# Patient Record
Sex: MALE | Race: WHITE | NOT HISPANIC OR LATINO | Employment: OTHER | URBAN - METROPOLITAN AREA
[De-identification: names, ages, dates, MRNs, and addresses within clinical notes are randomized per-mention and may not be internally consistent; named-entity substitution may affect disease eponyms.]

---

## 2017-01-01 ENCOUNTER — OFFICE VISIT (OUTPATIENT)
Dept: RADIATION ONCOLOGY | Facility: HOSPITAL | Age: 82
End: 2017-01-01

## 2017-01-01 ENCOUNTER — LAB REQUISITION (OUTPATIENT)
Dept: LAB | Facility: HOSPITAL | Age: 82
End: 2017-01-01

## 2017-01-01 ENCOUNTER — RADIATION ONCOLOGY WEEKLY ASSESSMENT (OUTPATIENT)
Dept: RADIATION ONCOLOGY | Facility: HOSPITAL | Age: 82
End: 2017-01-01

## 2017-01-01 ENCOUNTER — TELEPHONE (OUTPATIENT)
Dept: FAMILY MEDICINE CLINIC | Facility: CLINIC | Age: 82
End: 2017-01-01

## 2017-01-01 ENCOUNTER — OFFICE VISIT (OUTPATIENT)
Dept: FAMILY MEDICINE CLINIC | Facility: CLINIC | Age: 82
End: 2017-01-01

## 2017-01-01 ENCOUNTER — APPOINTMENT (OUTPATIENT)
Dept: GENERAL RADIOLOGY | Facility: HOSPITAL | Age: 82
End: 2017-01-01

## 2017-01-01 ENCOUNTER — DOCUMENTATION (OUTPATIENT)
Dept: RADIATION ONCOLOGY | Facility: HOSPITAL | Age: 82
End: 2017-01-01

## 2017-01-01 ENCOUNTER — RESULTS ENCOUNTER (OUTPATIENT)
Dept: FAMILY MEDICINE CLINIC | Facility: CLINIC | Age: 82
End: 2017-01-01

## 2017-01-01 ENCOUNTER — OFFICE VISIT (OUTPATIENT)
Dept: RETAIL CLINIC | Facility: CLINIC | Age: 82
End: 2017-01-01

## 2017-01-01 ENCOUNTER — APPOINTMENT (OUTPATIENT)
Dept: RADIATION ONCOLOGY | Facility: HOSPITAL | Age: 82
End: 2017-01-01

## 2017-01-01 ENCOUNTER — TELEPHONE (OUTPATIENT)
Dept: SOCIAL WORK | Facility: HOSPITAL | Age: 82
End: 2017-01-01

## 2017-01-01 ENCOUNTER — HOSPITAL ENCOUNTER (EMERGENCY)
Facility: HOSPITAL | Age: 82
Discharge: HOME | End: 2017-10-02
Attending: EMERGENCY MEDICINE | Admitting: EMERGENCY MEDICINE

## 2017-01-01 ENCOUNTER — CONSULT (OUTPATIENT)
Dept: RADIATION ONCOLOGY | Facility: HOSPITAL | Age: 82
End: 2017-01-01

## 2017-01-01 VITALS
SYSTOLIC BLOOD PRESSURE: 128 MMHG | HEART RATE: 63 BPM | BODY MASS INDEX: 26.92 KG/M2 | OXYGEN SATURATION: 95 % | WEIGHT: 193 LBS | DIASTOLIC BLOOD PRESSURE: 70 MMHG

## 2017-01-01 VITALS
SYSTOLIC BLOOD PRESSURE: 172 MMHG | HEIGHT: 71 IN | HEART RATE: 114 BPM | WEIGHT: 196.4 LBS | BODY MASS INDEX: 27.5 KG/M2 | RESPIRATION RATE: 16 BRPM | TEMPERATURE: 98.5 F | OXYGEN SATURATION: 95 % | DIASTOLIC BLOOD PRESSURE: 84 MMHG

## 2017-01-01 VITALS
OXYGEN SATURATION: 98 % | SYSTOLIC BLOOD PRESSURE: 213 MMHG | HEART RATE: 51 BPM | DIASTOLIC BLOOD PRESSURE: 73 MMHG | TEMPERATURE: 96.9 F | RESPIRATION RATE: 16 BRPM

## 2017-01-01 VITALS — OXYGEN SATURATION: 97 % | HEART RATE: 50 BPM

## 2017-01-01 VITALS
HEIGHT: 71 IN | RESPIRATION RATE: 16 BRPM | HEART RATE: 63 BPM | SYSTOLIC BLOOD PRESSURE: 128 MMHG | TEMPERATURE: 97.2 F | DIASTOLIC BLOOD PRESSURE: 70 MMHG | BODY MASS INDEX: 27.02 KG/M2 | OXYGEN SATURATION: 95 % | WEIGHT: 193 LBS

## 2017-01-01 VITALS
DIASTOLIC BLOOD PRESSURE: 88 MMHG | TEMPERATURE: 97.6 F | HEART RATE: 116 BPM | RESPIRATION RATE: 16 BRPM | OXYGEN SATURATION: 94 % | WEIGHT: 197 LBS | HEIGHT: 71 IN | BODY MASS INDEX: 27.58 KG/M2 | SYSTOLIC BLOOD PRESSURE: 166 MMHG

## 2017-01-01 VITALS
OXYGEN SATURATION: 96 % | HEART RATE: 82 BPM | TEMPERATURE: 98 F | DIASTOLIC BLOOD PRESSURE: 86 MMHG | SYSTOLIC BLOOD PRESSURE: 152 MMHG | RESPIRATION RATE: 16 BRPM

## 2017-01-01 VITALS
DIASTOLIC BLOOD PRESSURE: 97 MMHG | SYSTOLIC BLOOD PRESSURE: 197 MMHG | TEMPERATURE: 97 F | HEART RATE: 107 BPM | OXYGEN SATURATION: 95 % | RESPIRATION RATE: 16 BRPM

## 2017-01-01 VITALS
RESPIRATION RATE: 16 BRPM | TEMPERATURE: 97 F | OXYGEN SATURATION: 96 % | HEART RATE: 71 BPM | DIASTOLIC BLOOD PRESSURE: 85 MMHG | SYSTOLIC BLOOD PRESSURE: 190 MMHG

## 2017-01-01 VITALS
SYSTOLIC BLOOD PRESSURE: 187 MMHG | OXYGEN SATURATION: 100 % | WEIGHT: 197 LBS | BODY MASS INDEX: 27.58 KG/M2 | DIASTOLIC BLOOD PRESSURE: 85 MMHG | HEART RATE: 76 BPM | RESPIRATION RATE: 16 BRPM | HEIGHT: 71 IN

## 2017-01-01 VITALS — HEART RATE: 52 BPM | OXYGEN SATURATION: 97 % | RESPIRATION RATE: 16 BRPM | TEMPERATURE: 97 F

## 2017-01-01 DIAGNOSIS — M19.90 ARTHRITIS: ICD-10-CM

## 2017-01-01 DIAGNOSIS — E78.5 HYPERLIPIDEMIA, UNSPECIFIED HYPERLIPIDEMIA TYPE: Primary | ICD-10-CM

## 2017-01-01 DIAGNOSIS — S30.0XXA LUMBAR CONTUSION, INITIAL ENCOUNTER: Primary | ICD-10-CM

## 2017-01-01 DIAGNOSIS — R97.20 ELEVATED PROSTATE SPECIFIC ANTIGEN (PSA): ICD-10-CM

## 2017-01-01 DIAGNOSIS — I10 ESSENTIAL HYPERTENSION: ICD-10-CM

## 2017-01-01 DIAGNOSIS — Z23 NEEDS FLU SHOT: Primary | ICD-10-CM

## 2017-01-01 DIAGNOSIS — R97.20 ELEVATED PSA: ICD-10-CM

## 2017-01-01 DIAGNOSIS — M54.5 CHRONIC LOW BACK PAIN, UNSPECIFIED BACK PAIN LATERALITY, WITH SCIATICA PRESENCE UNSPECIFIED: ICD-10-CM

## 2017-01-01 DIAGNOSIS — C44.229 SQUAMOUS CELL CARCINOMA OF SKIN OF LEFT EAR AND EXTERNAL AUDITORY CANAL: Primary | ICD-10-CM

## 2017-01-01 DIAGNOSIS — Z79.899 HIGH RISK MEDICATION USE: ICD-10-CM
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
## 2017-01-01 DIAGNOSIS — M81.0 OSTEOPOROSIS: ICD-10-CM

## 2017-01-01 DIAGNOSIS — G89.29 CHRONIC LOW BACK PAIN, UNSPECIFIED BACK PAIN LATERALITY, WITH SCIATICA PRESENCE UNSPECIFIED: ICD-10-CM

## 2017-01-01 DIAGNOSIS — C44.229 SQUAMOUS CELL CARCINOMA OF SKIN OF LEFT EAR AND EXTERNAL AUDITORY CANAL: ICD-10-CM

## 2017-01-01 DIAGNOSIS — H61.92: ICD-10-CM

## 2017-01-01 LAB
1,25(OH)2D3 SERPL-MCNC: 35.8 PG/ML (ref 19.9–79.3)
ALBUMIN SERPL-MCNC: 4.1 G/DL (ref 3.5–5.2)
ALBUMIN/GLOB SERPL: 1.3 G/DL
ALP SERPL-CCNC: 63 U/L (ref 39–117)
ALT SERPL-CCNC: 15 U/L (ref 1–41)
AST SERPL-CCNC: 22 U/L (ref 1–40)
BASOPHILS # BLD AUTO: 0.02 10*3/MM3 (ref 0–0.2)
BASOPHILS NFR BLD AUTO: 0.3 % (ref 0–1.5)
BILIRUB SERPL-MCNC: 0.5 MG/DL (ref 0.1–1.2)
BUN SERPL-MCNC: 13 MG/DL (ref 8–23)
BUN/CREAT SERPL: 14.3 (ref 7–25)
CALCIUM SERPL-MCNC: 9.9 MG/DL (ref 8.6–10.5)
CHLORIDE SERPL-SCNC: 99 MMOL/L (ref 98–107)
CHOLEST SERPL-MCNC: 130 MG/DL (ref 0–200)
CO2 SERPL-SCNC: 27.4 MMOL/L (ref 22–29)
CREAT SERPL-MCNC: 0.91 MG/DL (ref 0.76–1.27)
EOSINOPHIL # BLD AUTO: 0.32 10*3/MM3 (ref 0–0.7)
EOSINOPHIL NFR BLD AUTO: 5.3 % (ref 0.3–6.2)
ERYTHROCYTE [DISTWIDTH] IN BLOOD BY AUTOMATED COUNT: 13.8 % (ref 11.5–14.5)
GLOBULIN SER CALC-MCNC: 3.1 GM/DL
GLUCOSE SERPL-MCNC: 95 MG/DL (ref 65–99)
HCT VFR BLD AUTO: 40.6 % (ref 40.4–52.2)
HDLC SERPL-MCNC: 69 MG/DL (ref 40–60)
HGB BLD-MCNC: 13.3 G/DL (ref 13.7–17.6)
IMM GRANULOCYTES # BLD: 0 10*3/MM3 (ref 0–0.03)
IMM GRANULOCYTES NFR BLD: 0 % (ref 0–0.5)
INTERPRETATION: NORMAL
LAB AP CASE REPORT: NORMAL
LAB AP CLINICAL INFORMATION: NORMAL
LDLC SERPL CALC-MCNC: 48 MG/DL (ref 0–100)
LYMPHOCYTES # BLD AUTO: 1.84 10*3/MM3 (ref 0.9–4.8)
LYMPHOCYTES NFR BLD AUTO: 30.3 % (ref 19.6–45.3)
Lab: NORMAL
MCH RBC QN AUTO: 31.3 PG (ref 27–32.7)
MCHC RBC AUTO-ENTMCNC: 32.8 G/DL (ref 32.6–36.4)
MCV RBC AUTO: 95.5 FL (ref 79.8–96.2)
MONOCYTES # BLD AUTO: 0.95 10*3/MM3 (ref 0.2–1.2)
MONOCYTES NFR BLD AUTO: 15.7 % (ref 5–12)
NEUTROPHILS # BLD AUTO: 2.94 10*3/MM3 (ref 1.9–8.1)
NEUTROPHILS NFR BLD AUTO: 48.4 % (ref 42.7–76)
PATH REPORT.FINAL DX SPEC: NORMAL
PATH REPORT.GROSS SPEC: NORMAL
PLATELET # BLD AUTO: 269 10*3/MM3 (ref 140–500)
POTASSIUM SERPL-SCNC: 4.3 MMOL/L (ref 3.5–5.2)
PROT SERPL-MCNC: 7.2 G/DL (ref 6–8.5)
PSA SERPL-MCNC: 4.45 NG/ML (ref 0–4)
RBC # BLD AUTO: 4.25 10*6/MM3 (ref 4.6–6)
SODIUM SERPL-SCNC: 139 MMOL/L (ref 136–145)
TRIGL SERPL-MCNC: 65 MG/DL (ref 0–150)
VLDLC SERPL CALC-MCNC: 13 MG/DL (ref 5–40)
WBC # BLD AUTO: 6.07 10*3/MM3 (ref 4.5–10.7)

## 2017-01-01 PROCEDURE — 77331 SPECIAL RADIATION DOSIMETRY: CPT | Performed by: RADIOLOGY

## 2017-01-01 PROCEDURE — 77412 RADIATION TX DELIVERY LVL 3: CPT | Performed by: RADIOLOGY

## 2017-01-01 PROCEDURE — 77336 RADIATION PHYSICS CONSULT: CPT | Performed by: RADIOLOGY

## 2017-01-01 PROCEDURE — 77427 RADIATION TX MANAGEMENT X5: CPT | Performed by: RADIOLOGY

## 2017-01-01 PROCEDURE — 99213 OFFICE O/P EST LOW 20 MIN: CPT | Performed by: FAMILY MEDICINE

## 2017-01-01 PROCEDURE — 77334 RADIATION TREATMENT AID(S): CPT | Performed by: RADIOLOGY

## 2017-01-01 PROCEDURE — 72110 X-RAY EXAM L-2 SPINE 4/>VWS: CPT

## 2017-01-01 PROCEDURE — 77417 THER RADIOLOGY PORT IMAGE(S): CPT | Performed by: RADIOLOGY

## 2017-01-01 PROCEDURE — 77300 RADIATION THERAPY DOSE PLAN: CPT | Performed by: RADIOLOGY

## 2017-01-01 PROCEDURE — G0463 HOSPITAL OUTPT CLINIC VISIT: HCPCS | Performed by: RADIOLOGY

## 2017-01-01 PROCEDURE — 77280 THER RAD SIMULAJ FIELD SMPL: CPT | Performed by: RADIOLOGY

## 2017-01-01 PROCEDURE — 99024 POSTOP FOLLOW-UP VISIT: CPT | Performed by: RADIOLOGY

## 2017-01-01 PROCEDURE — 77295 3-D RADIOTHERAPY PLAN: CPT | Performed by: RADIOLOGY

## 2017-01-01 PROCEDURE — 77290 THER RAD SIMULAJ FIELD CPLX: CPT | Performed by: RADIOLOGY

## 2017-01-01 PROCEDURE — 99204 OFFICE O/P NEW MOD 45 MIN: CPT | Performed by: RADIOLOGY

## 2017-01-01 PROCEDURE — 77263 THER RADIOLOGY TX PLNG CPLX: CPT | Performed by: RADIOLOGY

## 2017-01-01 PROCEDURE — 77332 RADIATION TREATMENT AID(S): CPT | Performed by: RADIOLOGY

## 2017-01-01 PROCEDURE — 99283 EMERGENCY DEPT VISIT LOW MDM: CPT

## 2017-01-01 PROCEDURE — 88305 TISSUE EXAM BY PATHOLOGIST: CPT | Performed by: OTOLARYNGOLOGY

## 2017-01-01 RX ORDER — RAMIPRIL 10 MG/1
CAPSULE ORAL
Qty: 180 CAPSULE | Refills: 0 | Status: SHIPPED | OUTPATIENT
Start: 2017-01-01 | End: 2017-01-01 | Stop reason: SDUPTHER

## 2017-01-01 RX ORDER — RAMIPRIL 10 MG/1
CAPSULE ORAL
Qty: 180 CAPSULE | Refills: 0
Start: 2017-01-01 | End: 2017-01-01 | Stop reason: SDUPTHER

## 2017-01-01 RX ORDER — BACLOFEN 10 MG/1
10 TABLET ORAL 3 TIMES DAILY PRN
Qty: 15 TABLET | Refills: 0 | Status: ON HOLD | OUTPATIENT
Start: 2017-01-01 | End: 2018-01-01

## 2017-01-01 RX ORDER — BACLOFEN 10 MG/1
10 TABLET ORAL ONCE
Status: COMPLETED | OUTPATIENT
Start: 2017-01-01 | End: 2017-01-01

## 2017-01-01 RX ORDER — HYDROCODONE BITARTRATE AND ACETAMINOPHEN 7.5; 325 MG/1; MG/1
1 TABLET ORAL EVERY 8 HOURS PRN
Qty: 60 TABLET | Refills: 0 | Status: SHIPPED | OUTPATIENT
Start: 2017-01-01 | End: 2017-01-01 | Stop reason: SDUPTHER

## 2017-01-01 RX ORDER — VALSARTAN 160 MG/1
160 TABLET ORAL DAILY
Qty: 90 TABLET | Refills: 3 | Status: ON HOLD | OUTPATIENT
Start: 2017-01-01 | End: 2018-01-01

## 2017-01-01 RX ORDER — CELECOXIB 200 MG/1
200 CAPSULE ORAL DAILY
Qty: 90 CAPSULE | Refills: 3 | Status: ON HOLD | OUTPATIENT
Start: 2017-01-01 | End: 2018-01-01

## 2017-01-01 RX ORDER — HYDROCODONE BITARTRATE AND ACETAMINOPHEN 7.5; 325 MG/1; MG/1
1 TABLET ORAL EVERY 8 HOURS PRN
Qty: 90 TABLET | Refills: 0 | Status: SHIPPED | OUTPATIENT
Start: 2017-01-01 | End: 2017-01-01 | Stop reason: SDUPTHER

## 2017-01-01 RX ORDER — CELECOXIB 200 MG/1
CAPSULE ORAL
Qty: 90 CAPSULE | Refills: 0 | Status: SHIPPED | OUTPATIENT
Start: 2017-01-01 | End: 2017-01-01 | Stop reason: SDUPTHER

## 2017-01-01 RX ORDER — HYDROCODONE BITARTRATE AND ACETAMINOPHEN 7.5; 325 MG/1; MG/1
1 TABLET ORAL EVERY 8 HOURS PRN
Qty: 90 TABLET | Refills: 0 | Status: SHIPPED | OUTPATIENT
Start: 2017-01-01 | End: 2018-01-01 | Stop reason: SDUPTHER

## 2017-01-01 RX ORDER — SIMVASTATIN 20 MG
20 TABLET ORAL NIGHTLY
Qty: 90 TABLET | Refills: 3 | Status: SHIPPED | OUTPATIENT
Start: 2017-01-01 | End: 2018-01-01 | Stop reason: HOSPADM

## 2017-01-01 RX ORDER — OXYCODONE HYDROCHLORIDE AND ACETAMINOPHEN 5; 325 MG/1; MG/1
1 TABLET ORAL ONCE
Status: COMPLETED | OUTPATIENT
Start: 2017-01-01 | End: 2017-01-01

## 2017-01-01 RX ORDER — SIMVASTATIN 20 MG
20 TABLET ORAL NIGHTLY
Qty: 90 TABLET | Refills: 0
Start: 2017-01-01 | End: 2017-01-01 | Stop reason: SDUPTHER

## 2017-01-01 RX ORDER — VALSARTAN 160 MG/1
TABLET ORAL
Qty: 90 TABLET | Refills: 0 | Status: SHIPPED | OUTPATIENT
Start: 2017-01-01 | End: 2017-01-01 | Stop reason: SDUPTHER

## 2017-01-01 RX ORDER — RAMIPRIL 10 MG/1
CAPSULE ORAL
Qty: 180 CAPSULE | Refills: 0 | Status: SHIPPED | OUTPATIENT
Start: 2017-01-01 | End: 2018-01-01 | Stop reason: HOSPADM

## 2017-01-01 RX ADMIN — OXYCODONE HYDROCHLORIDE AND ACETAMINOPHEN 1 TABLET: 5; 325 TABLET ORAL at 10:01

## 2017-01-01 RX ADMIN — BACLOFEN 10 MG: 10 TABLET ORAL at 09:59

## 2017-01-04 RX ORDER — HYDROCODONE BITARTRATE AND ACETAMINOPHEN 7.5; 325 MG/1; MG/1
1 TABLET ORAL 2 TIMES DAILY
Qty: 60 TABLET | Refills: 0 | Status: SHIPPED | OUTPATIENT
Start: 2017-01-04 | End: 2017-02-22 | Stop reason: SDUPTHER

## 2017-01-04 NOTE — TELEPHONE ENCOUNTER
----- Message from Fern Barillas sent at 1/3/2017  2:11 PM EST -----  HYDROcodone-acetaminophen (NORCO) 7.5-325 MG per tablet  Sig: Take 1 tablet by mouth 2 (two) times a day.      PT WILL BE IN Thursday WITH HIS WIFE, SO YOU DON'T HAVE TO CALL WHEN ITS READY.

## 2017-01-20 RX ORDER — SIMVASTATIN 20 MG
20 TABLET ORAL NIGHTLY
Qty: 90 TABLET | Refills: 0 | Status: SHIPPED | OUTPATIENT
Start: 2017-01-20 | End: 2017-01-01 | Stop reason: SDUPTHER

## 2017-01-20 RX ORDER — RAMIPRIL 10 MG/1
CAPSULE ORAL
Qty: 180 CAPSULE | Refills: 0 | Status: SHIPPED | OUTPATIENT
Start: 2017-01-20 | End: 2017-01-01 | Stop reason: SDUPTHER

## 2017-02-21 ENCOUNTER — TELEPHONE (OUTPATIENT)
Dept: FAMILY MEDICINE CLINIC | Facility: CLINIC | Age: 82
End: 2017-02-21

## 2017-02-21 NOTE — TELEPHONE ENCOUNTER
Please call advised the patient he needs to come in and see -year-old he has not actually been in the office for a visit for probably 7 or 8 months and he needs to re-sign his [facility] cannot fill his medication though this is not

## 2017-02-21 NOTE — TELEPHONE ENCOUNTER
Updating allison. Please advise about refilling Hydrocodone. He has not been seen since June 2016.

## 2017-02-21 NOTE — TELEPHONE ENCOUNTER
rf request for hydrocodone 7.5-325    Takes one tablet q12 hrs    Says he can pickup tomorrow    Do you want to refill or should I send to ?    He has not been seen since June 2016,I can update allison and he had a rf 1/2017. Please advise. Not sure how he kept receiving refills without being seem.

## 2017-02-22 ENCOUNTER — TELEPHONE (OUTPATIENT)
Dept: FAMILY MEDICINE CLINIC | Facility: CLINIC | Age: 82
End: 2017-02-22

## 2017-02-22 RX ORDER — HYDROCODONE BITARTRATE AND ACETAMINOPHEN 7.5; 325 MG/1; MG/1
1 TABLET ORAL EVERY 8 HOURS PRN
Qty: 60 TABLET | Refills: 0 | Status: SHIPPED | OUTPATIENT
Start: 2017-02-22 | End: 2017-01-01 | Stop reason: SDUPTHER

## 2017-02-22 NOTE — TELEPHONE ENCOUNTER
Krzysztof Ward EDUARDO  Male, 87 y.o., 09/02/1929  PCP:   Willie Ramirez MD  Language:   English  Need Interp:   No  Last Weight:   198 lb (89.8 kg)  Phone:   H: [phone]  Allergies  No Known Allergies  Health Maintenance:   Due  FYI:   None  Primary Ins.:   HARSHAD PASCUAL  MRN:   [id number]  MyChart:   Code Exp  Pharmacy:   [facility] #[id number] - [place], [place] - [address] AT [facility] [phone] [facility] [phone] FX [[id number]] [facility] [address] [facility] [phone] [facility] [phone] FX [[id number]] [facility] - [address] [phone] [place] [phone] FX [[id number]] [facility] - [address] [phone] [place] [phone] FX [[id number]]  Preferred Lab:   None  Next Appt Date by Dept:   03/06/2017    Patient Calls            Stephon Dow MD   You 13 hours ago (5:40 PM)               Please call advised the patient he needs to come in and see Dr.-year-old he has not actually been in the office for a visit for probably 7 or 8 months and he needs to re-sign his [facility] cannot fill his medication though this is not                  Documentation                          You   Stephon Dow MD 17 hours ago (1:39 PM)                 Updating allison. Please advise about refilling Hydrocodone. He has not been seen since June 2016.                  Documentation                          Willie Ramirez MD   You 17 hours ago (1:35 PM)                 Send to Dr Dow                  Documentation                          You routed conversation to Willie Ramirez MD 18 hours ago (1:18 PM)                       You 18 hours ago (1:12 PM)                 rf request for hydrocodone 7.5-325     Takes one tablet q12 hrs     Says he can pickup tomorrow     Do you want to refill or should I send to ?     He has not been seen since June 2016,I can update allison and  he had a rf 1/2017. Please advise. Not sure how he kept receiving refills without being seem.                     Documentation                                     Patient was informed he needs appt and he scheduled for next week.rx not approved

## 2017-02-24 RX ORDER — VALSARTAN 160 MG/1
TABLET ORAL
Qty: 90 TABLET | Refills: 0 | Status: SHIPPED | OUTPATIENT
Start: 2017-02-24 | End: 2017-01-01 | Stop reason: SDUPTHER

## 2017-02-24 RX ORDER — CELECOXIB 200 MG/1
CAPSULE ORAL
Qty: 90 CAPSULE | Refills: 0 | Status: SHIPPED | OUTPATIENT
Start: 2017-02-24 | End: 2017-01-01 | Stop reason: SDUPTHER

## 2017-03-06 ENCOUNTER — OFFICE VISIT (OUTPATIENT)
Dept: FAMILY MEDICINE CLINIC | Facility: CLINIC | Age: 82
End: 2017-03-06

## 2017-03-06 VITALS
BODY MASS INDEX: 27.16 KG/M2 | WEIGHT: 194 LBS | RESPIRATION RATE: 15 BRPM | HEART RATE: 64 BPM | DIASTOLIC BLOOD PRESSURE: 70 MMHG | HEIGHT: 71 IN | TEMPERATURE: 97.6 F | SYSTOLIC BLOOD PRESSURE: 160 MMHG

## 2017-03-06 DIAGNOSIS — Z79.899 HIGH RISK MEDICATION USE: Primary | ICD-10-CM

## 2017-03-06 DIAGNOSIS — M81.0 OSTEOPOROSIS: ICD-10-CM

## 2017-03-06 DIAGNOSIS — E78.5 HYPERLIPIDEMIA, UNSPECIFIED HYPERLIPIDEMIA TYPE: ICD-10-CM

## 2017-03-06 DIAGNOSIS — I10 ESSENTIAL HYPERTENSION: ICD-10-CM

## 2017-03-06 DIAGNOSIS — R97.20 ELEVATED PSA: ICD-10-CM

## 2017-03-06 DIAGNOSIS — R97.20 ELEVATED PROSTATE SPECIFIC ANTIGEN (PSA): ICD-10-CM

## 2017-03-06 PROCEDURE — 99213 OFFICE O/P EST LOW 20 MIN: CPT | Performed by: FAMILY MEDICINE

## 2017-03-06 RX ORDER — ALENDRONATE SODIUM 70 MG/1
70 TABLET ORAL
Qty: 12 TABLET | Refills: 3 | Status: SHIPPED | OUTPATIENT
Start: 2017-03-06 | End: 2018-01-01 | Stop reason: SDUPTHER

## 2017-03-06 NOTE — PROGRESS NOTES
HPI  Krzysztof Ward is a 87 y.o. male who is here for follow up of multiple medical problems.  Here to establish a new primary care physician after Dr. Dow retired.  Does take pain medication for rather significant osteoarthritis and osteoporosis. Having significant right knee pain at this time.  Having some clear drainage from the right ear.  Has multiple skin lesions consistent with skin cancers followed by dermatologist.      Review of Systems   HENT: Positive for ear discharge.    Genitourinary: Positive for frequency.   Musculoskeletal: Positive for arthralgias and back pain.   Skin:        Multiple actinic skin changes with very pale complexion   All other systems reviewed and are negative.        No past medical history on file.    No past surgical history on file.    No family history on file.    Social History     Social History   • Marital status:      Spouse name: N/A   • Number of children: N/A   • Years of education: N/A     Occupational History   • Not on file.     Social History Main Topics   • Smoking status: Former Smoker   • Smokeless tobacco: Not on file      Comment: quit 1962   • Alcohol use Not on file   • Drug use: Not on file   • Sexual activity: Not on file     Other Topics Concern   • Not on file     Social History Narrative         Physical Exam   Constitutional: He is oriented to person, place, and time. He appears well-developed and well-nourished. No distress.   HENT:   Head: Normocephalic.   Left Ear: There is drainage. Decreased hearing is noted.   Ears:    Mouth/Throat: Oropharynx is clear and moist.   Eyes: Conjunctivae are normal. Pupils are equal, round, and reactive to light.   Neck: Normal range of motion. No tracheal deviation present.   Cardiovascular: Normal rate and regular rhythm.    Pulmonary/Chest: Effort normal and breath sounds normal.   Abdominal: Soft. Bowel sounds are normal.   Musculoskeletal: He exhibits deformity. He exhibits no edema.   Significant spinal  deformity as well as osteoarthritic right knee with deformity   Neurological: He is alert and oriented to person, place, and time.   Skin: Skin is warm and dry.   Multiple skin lesions noted.  Status post removal left external ear section   Psychiatric: He has a normal mood and affect. His behavior is normal. Judgment and thought content normal.   Nursing note and vitals reviewed.        Assessment/Plan    Krzysztof was seen today for annual exam.    Diagnoses and all orders for this visit:    High risk medication use  -     CBC & Differential; Future  -     Comprehensive Metabolic Panel; Future    Hyperlipidemia, unspecified hyperlipidemia type  -     Lipid Panel; Future    Essential hypertension    Osteoporosis  -     PSA; Future    Elevated PSA  -     Vitamin D 1,25 Dihydroxy; Future    Elevated prostate specific antigen (PSA)   -     PSA; Future    Other orders  -     alendronate (FOSAMAX) 70 MG tablet; Take 1 tablet by mouth Every 7 (Seven) Days.        Patient is mostly here to establish a new primary care physician.  Does get pain medication and is here to establish physician.  Has obvious severe osteoporotic and arthritic changes.  Seems to be fairly stable on current regimen which will be continued including pain medication as needed.  Will return for fasting lab work in 2 months and then routine follow-up appointment in 3 months.    This note includes information entered using a voice recognition dictation system.  Though reviewed, some nonsensible errors may remain.

## 2017-03-11 ENCOUNTER — RESULTS ENCOUNTER (OUTPATIENT)
Dept: FAMILY MEDICINE CLINIC | Facility: CLINIC | Age: 82
End: 2017-03-11

## 2017-03-11 DIAGNOSIS — E78.5 HYPERLIPIDEMIA, UNSPECIFIED HYPERLIPIDEMIA TYPE: ICD-10-CM

## 2017-04-10 ENCOUNTER — TELEPHONE (OUTPATIENT)
Dept: FAMILY MEDICINE CLINIC | Facility: CLINIC | Age: 82
End: 2017-04-10

## 2017-04-10 NOTE — TELEPHONE ENCOUNTER
Refill request for Hydrocodone 7.5-325mg per tablet    Last ov 3/6/2017, allison 2/2017, last rf 2/22/17    Please advise regarding refill

## 2017-04-11 NOTE — TELEPHONE ENCOUNTER
Telephone     4/10/2017  [facility]    [facility], [place]    Conversation   (Oldest Message First)   Me        4/10/17 1:28 PM   Note      Refill request for Hydrocodone 7.5-325mg per tablet     Last ov 3/6/2017, allison 2/2017, last rf 2/22/17     Please advise regarding refill                        4/10/17 1:33 PM   You routed this conversation to Willie Ramirez MD    Additional Documentation   Encounter Info:     Billing Info,     History,     Allergies,     Detailed Report        Orders Placed     None   Medication Renewals and Changes       None      Medication List   Visit Diagnoses       None      Problem List     Ok to refill hydrocodone?  This appears to be Dr. Ramirez's patient please route this message to him

## 2017-06-21 NOTE — PROGRESS NOTES
HPI  Krzysztof Ward is a 87 y.o. male who is here for follow up of hypertension arthritis and other general medical problems.  Reviewed recent lab work all of which was unremarkable.  Has follow-up appointment with ear specialist and also sees dermatologist frequently for skin cancers.  Needs renewal on mail-order medications.  Also takes occasional pain medication as needed.  His son is a chiropractor and will be seeing him later today for his chronic back pain.  All of this was discussed as well as reviewing recent lab.      Review of Systems   HENT: Positive for ear discharge.    Musculoskeletal: Positive for arthralgias and back pain.   Skin: Positive for wound.   Psychiatric/Behavioral:        Recent death of wife   All other systems reviewed and are negative.        No past medical history on file.    No past surgical history on file.    No family history on file.    Social History     Social History   • Marital status:      Spouse name: N/A   • Number of children: N/A   • Years of education: N/A     Occupational History   • Not on file.     Social History Main Topics   • Smoking status: Former Smoker   • Smokeless tobacco: Not on file      Comment: quit 1962   • Alcohol use Not on file   • Drug use: Not on file   • Sexual activity: Not on file     Other Topics Concern   • Not on file     Social History Narrative         Physical Exam   Constitutional: He is oriented to person, place, and time. He appears well-developed and well-nourished. No distress.   HENT:   Left Ear: There is drainage.   Ears:    Mouth/Throat: Oropharynx is clear and moist.   Eyes: Conjunctivae and EOM are normal. Pupils are equal, round, and reactive to light.   Neck: Normal range of motion. Neck supple. No thyromegaly present.   Cardiovascular: Normal rate and regular rhythm.    Pulmonary/Chest: Effort normal.   Abdominal: Soft. Bowel sounds are normal.   Musculoskeletal: He exhibits deformity.   Ambulates using a cane with    Neurological: He is alert and oriented to person, place, and time. He exhibits normal muscle tone. Coordination normal.   Skin: Skin is warm and dry. Lesion noted.        Psychiatric: He has a normal mood and affect. His behavior is normal. Judgment and thought content normal.   Nursing note and vitals reviewed.        Assessment/Plan    Krzysztof was seen today for hypertension and hyperlipidemia.    Diagnoses and all orders for this visit:    Hyperlipidemia, unspecified hyperlipidemia type    Essential hypertension    Chronic low back pain, unspecified back pain laterality, with sciatica presence unspecified    Osteoporosis    Arthritis    Elevated PSA    High risk medication use    Other orders  -     simvastatin (ZOCOR) 20 MG tablet; Take 1 tablet by mouth Every Night.  -     valsartan (DIOVAN) 160 MG tablet; Take 1 tablet by mouth Daily.  -     celecoxib (CeleBREX) 200 MG capsule; Take 1 capsule by mouth Daily.  -     ramipril (ALTACE) 10 MG capsule; TAKE 2 CAPSULES DAILY  -     HYDROcodone-acetaminophen (NORCO) 7.5-325 MG per tablet; Take 1 tablet by mouth Every 8 (Eight) Hours As Needed for Moderate Pain (4-6).    Patient here for routine follow-up of multiple medical problems some of which are noted above.  Adjusting to recent death of wife.  Has follow-up appointment with ear specialist and dermatologist.  Reviewed all recent lab tests which were unremarkable.  Renewed medications as discussed hopefully will not get harassed by insurance coverage?  Otherwise continue current care with follow-up in 3 months.    This note includes information entered using a voice recognition dictation system.  Though reviewed, some nonsensible errors may remain.

## 2017-08-16 PROBLEM — C44.229: Status: ACTIVE | Noted: 2017-01-01

## 2017-08-16 NOTE — PROGRESS NOTES
Subjective     No ref. provider found     Diagnosis Plan   1. 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 Núñez:    I had the [facility] of seeing Krzysztof [facility]   The patient is a 87 y.o. year old male with recently diagnosed squamous cell carcinoma of the left ear canal.  He was first diagnosed with squamous cell carcinoma of the left ear in 2011 and underwent resection of most of the external ear at that time. He was evaluated by Dr. Tushar Núñez on 8/14/17 and on physical exam was found to have a diffuse squamous cell carcinoma involving the left ear canal and extending to the ear drum with a red exophytic lesion filling the left ear canal.  Biopsies of the lesion from 7/13/17 were positive for squmous cell carcinoma.  He had a CT of the temporal bone on 8/8/17 which showed abnormal attenuation filling the left external and middle ear.  He denies any pain or discomfort in the left ear.  He reports occasional drainage and decreased hearing on the left.  He remains very active for an 87 year old gentleman and still lives alone in his house which he cares for.    .        Review of Systems   Constitutional: Negative.    HENT: Positive for hearing loss. Negative for ear pain.    Eyes: Negative.    Respiratory: Negative.    Cardiovascular: Negative.    Gastrointestinal: Negative.    Genitourinary: Negative.    Musculoskeletal: Positive for arthralgias and back pain.   Neurological: Negative.    Hematological: Negative.    Psychiatric/Behavioral: Negative.          No past medical history on file.      No past surgical history on file.      Social History     Social History   • Marital status:      Spouse name: N/A   • Number of children: N/A   • Years of education: N/A     Social History Main Topics   • Smoking status: Former Smoker   • Smokeless  tobacco: Not on file      Comment: quit 1962   • Alcohol use Not on file   • Drug use: Not on file   • Sexual activity: Not on file     Other Topics Concern   • Not on file     Social History Narrative         No family history on file.       Objective    Physical Exam   Constitutional: He appears well-developed and well-nourished.   HENT:   Head: Normocephalic.   Left external ear deformity with most of the superior portion of ear surgically absent, pinna intact.  Erythematous lobular lesion in the left ear canal which begins at the entrance of the canal and may extend to the tympanic membrane   Eyes: EOM are normal. Pupils are equal, round, and reactive to light.   Neck: Neck supple.   Musculoskeletal: Normal range of motion.   Lymphadenopathy:     He has no cervical adenopathy.   Skin: Skin is warm and dry.   Psychiatric: He has a normal mood and affect. His behavior is normal.         Current Outpatient Prescriptions on File Prior to Visit   Medication Sig Dispense Refill   • acetaminophen (TYLENOL) 325 MG tablet Take by mouth.     • alendronate (FOSAMAX) 70 MG tablet Take 1 tablet by mouth Every 7 (Seven) Days. 12 tablet 3   • celecoxib (CeleBREX) 200 MG capsule Take 1 capsule by mouth Daily. 90 capsule 3   • desonide (DESOWEN) 0.05 % cream Apply topically.     • famotidine (PEPCID) 20 MG tablet Take by mouth daily.     • Glucosamine HCl 500 MG tablet Take by mouth.     • HYDROcodone-acetaminophen (NORCO) 7.5-325 MG per tablet Take 1 tablet by mouth Every 8 (Eight) Hours As Needed for Moderate Pain (4-6). 90 tablet 0   • Multiple Vitamin (MULTI-VITAMIN) tablet Take by mouth.     • ramipril (ALTACE) 10 MG capsule TAKE 2 CAPSULES DAILY 180 capsule 0   • senna-docusate (PERICOLACE) 8.6-50 MG per tablet Take by mouth.     • simvastatin (ZOCOR) 20 MG tablet Take 1 tablet by mouth Every Night. 90 tablet 3   • valsartan (DIOVAN) 160 MG tablet Take 1 tablet by mouth Daily. 90 tablet 3     No current  facility-administered medications on file prior to visit.        ALLERGIES:  No Known Allergies    There were no vitals taken for this visit.     No flowsheet data found.      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  He is not a candidate for surgical resection.  I discussed with him my recommendation for radiaton therapy to the left ear and ear canal.  I discussed both acute and long term side effects to include but not limited to skin erythema, breakdown, pain, discomfort, fatigue, ringing in the ear, loss of hearing, nausea, vomiting, headaches and the remote risk of damage to the underlying brain.  He and his son voiced understanding and an informed consent was signed and placed on the chart.  We will proceed with CT simulation today and plan to begin his treatments late next week. I plan to deliver a dose of 68Gy in 34 treatments.      I spent over 45 minutes face-to-face with the patient today and of that time, 35 minutes were spent counseling and coordinating care.       Thank you very much for allowing me to participate in the care of this very pleasant patient.    Sincerely,      Karla Bush MD

## 2017-08-30 NOTE — PROGRESS NOTES
Physician Weekly Management Note    Diagnosis:     Diagnosis Plan   1. 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   Yes  New medications given?   No  Problemlist reviewed?   Yes  Problem added?   No  Issues raised requiring referral to support services - task assigned to:  radha    Technical aspects reviewed:  Weekly OBI approved?   Yes  Weekly port films approved?   Yes  Change requests noted on port film?   No  Patient setup and plan reviewed?   Yes    Chart Reviewed:  Continue current treatment plan?   Yes  Treatment plan change requested?   No  CBC reviewed?   No  Concurrent Chemo?   No    Objective     Toxicities:   noone     Review of Systems   Constitutional: Negative.           Vitals:    08/30/17 1427   Pulse: 52   Resp: 16   Temp: 97 °F (36.1 °C)   SpO2: 97%       No flowsheet data found.    Physical Exam   Constitutional: He appears well-developed and well-nourished.   Skin: Skin is dry. No erythema.           Problem Summary List    Diagnosis:     Diagnosis Plan   1. 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      No past surgical history on file.      Current Outpatient Prescriptions on File Prior to Visit   Medication Sig Dispense Refill   • acetaminophen (TYLENOL) 325 MG tablet Take by mouth.     • alendronate (FOSAMAX) 70 MG tablet Take 1 tablet by mouth Every 7 (Seven) Days. 12 tablet 3   • celecoxib (CeleBREX) 200 MG capsule Take 1 capsule by mouth Daily. 90 capsule 3   • desonide (DESOWEN) 0.05 % cream Apply topically.     • famotidine (PEPCID) 20 MG tablet Take by mouth daily.     • Glucosamine HCl 500 MG tablet Take by mouth.     • HYDROcodone-acetaminophen (NORCO) 7.5-325 MG per tablet Take 1 tablet by mouth Every 8  (Eight) Hours As Needed for Moderate Pain (4-6). 90 tablet 0   • Multiple Vitamin (MULTI-VITAMIN) tablet Take by mouth.     • mupirocin (BACTROBAN) 2 % ointment Apply sparingly to the biopsy area 2 times a day  3   • ramipril (ALTACE) 10 MG capsule TAKE 2 CAPSULES DAILY 180 capsule 0   • senna-docusate (PERICOLACE) 8.6-50 MG per tablet Take by mouth.     • simvastatin (ZOCOR) 20 MG tablet Take 1 tablet by mouth Every Night. 90 tablet 3   • valsartan (DIOVAN) 160 MG tablet Take 1 tablet by mouth Daily. 90 tablet 3     No current facility-administered medications on file prior to visit.        No Known Allergies      Referring Provider:    No referring provider defined for this encounter.    Oncologist:  No primary care provider on file.      Seen and approved by:  Karla Bush MD  08/30/2017

## 2017-09-06 NOTE — PROGRESS NOTES
Physician Weekly Management Note    Diagnosis:     Diagnosis Plan   1. 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   Yes  New medications given?   No  Problemlist reviewed?   Yes  Problem added?   No  Issues raised requiring referral to support services - task assigned to:  radha    Technical aspects reviewed:  Weekly OBI approved?   Yes  Weekly port films approved?   Yes  Change requests noted on port film?   No  Patient setup and plan reviewed?   Yes    Chart Reviewed:  Continue current treatment plan?   Yes  Treatment plan change requested?   No  CBC reviewed?   No  Concurrent Chemo?   No    Objective     Toxicities:   none     Review of Systems   Constitutional: Negative.           Vitals:    09/06/17 1418   Pulse: 50   SpO2: 97%       No flowsheet data found.    Physical Exam   Constitutional: He appears well-developed and well-nourished.   HENT:   Head: Normocephalic and atraumatic.           Problem Summary List    Diagnosis:     Diagnosis Plan   1. 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      No past surgical history on file.      Current Outpatient Prescriptions on File Prior to Visit   Medication Sig Dispense Refill   • acetaminophen (TYLENOL) 325 MG tablet Take by mouth.     • alendronate (FOSAMAX) 70 MG tablet Take 1 tablet by mouth Every 7 (Seven) Days. 12 tablet 3   • celecoxib (CeleBREX) 200 MG capsule Take 1 capsule by mouth Daily. 90 capsule 3   • desonide (DESOWEN) 0.05 % cream Apply topically.     • famotidine (PEPCID) 20 MG tablet Take by mouth daily.     • Glucosamine HCl 500 MG tablet Take by mouth.     • HYDROcodone-acetaminophen (NORCO) 7.5-325 MG per tablet Take 1 tablet by mouth Every 8  (Eight) Hours As Needed for Moderate Pain (4-6). 90 tablet 0   • Multiple Vitamin (MULTI-VITAMIN) tablet Take by mouth.     • mupirocin (BACTROBAN) 2 % ointment Apply sparingly to the biopsy area 2 times a day  3   • ramipril (ALTACE) 10 MG capsule TAKE 2 CAPSULES DAILY 180 capsule 0   • senna-docusate (PERICOLACE) 8.6-50 MG per tablet Take by mouth.     • simvastatin (ZOCOR) 20 MG tablet Take 1 tablet by mouth Every Night. 90 tablet 3   • valsartan (DIOVAN) 160 MG tablet Take 1 tablet by mouth Daily. 90 tablet 3     No current facility-administered medications on file prior to visit.        No Known Allergies      Referring Provider:    No referring provider defined for this encounter.    Oncologist:  No primary care provider on file.      Seen and approved by:  Karla Bush MD  09/06/2017

## 2017-09-11 NOTE — PROGRESS NOTES
Physician Weekly Management Note    Diagnosis:     Diagnosis Plan   1. 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   Yes  New medications given?   No  Problemlist reviewed?   Yes  Problem added?   No  Issues raised requiring referral to support services - task assigned to:  radha    Technical aspects reviewed:  Weekly OBI approved?   Yes  Weekly port films approved?   Yes  Change requests noted on port film?   No  Patient setup and plan reviewed?   Yes    Chart Reviewed:  Continue current treatment plan?   Yes  Treatment plan change requested?   No  CBC reviewed?   No  Concurrent Chemo?   No    Objective     Toxicities:   none     Review of Systems   Constitutional: Negative.    HENT: Negative.    Respiratory: Negative.    Skin: Negative.           There were no vitals filed for this visit.    No flowsheet data found.    Physical Exam   Constitutional: He appears well-developed and well-nourished.   HENT:   Ears:            Problem Summary List    Diagnosis:     Diagnosis Plan   1. 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      No past surgical history on file.      Current Outpatient Prescriptions on File Prior to Visit   Medication Sig Dispense Refill   • acetaminophen (TYLENOL) 325 MG tablet Take by mouth.     • alendronate (FOSAMAX) 70 MG tablet Take 1 tablet by mouth Every 7 (Seven) Days. 12 tablet 3   • celecoxib (CeleBREX) 200 MG capsule Take 1 capsule by mouth Daily. 90 capsule 3   • desonide (DESOWEN) 0.05 % cream Apply topically.     • famotidine (PEPCID) 20 MG tablet Take by mouth daily.     • Glucosamine HCl 500 MG tablet Take by mouth.     • HYDROcodone-acetaminophen (NORCO)  7.5-325 MG per tablet Take 1 tablet by mouth Every 8 (Eight) Hours As Needed for Moderate Pain (4-6). 90 tablet 0   • Multiple Vitamin (MULTI-VITAMIN) tablet Take by mouth.     • mupirocin (BACTROBAN) 2 % ointment Apply sparingly to the biopsy area 2 times a day  3   • ramipril (ALTACE) 10 MG capsule TAKE 2 CAPSULES DAILY 180 capsule 0   • senna-docusate (PERICOLACE) 8.6-50 MG per tablet Take by mouth.     • simvastatin (ZOCOR) 20 MG tablet Take 1 tablet by mouth Every Night. 90 tablet 3   • valsartan (DIOVAN) 160 MG tablet Take 1 tablet by mouth Daily. 90 tablet 3     No current facility-administered medications on file prior to visit.        No Known Allergies      Referring Provider:    No referring provider defined for this encounter.    Oncologist:  No primary care provider on file.      Seen and approved by:  Karla Bush MD  09/11/2017

## 2017-09-20 NOTE — PROGRESS NOTES
Physician Weekly Management Note    Diagnosis:     Diagnosis Plan   1. 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   Yes  New medications given?   No  Problemlist reviewed?   Yes  Problem added?   No  Issues raised requiring referral to support services - task assigned to:  na    Technical aspects reviewed:  Weekly OBI approved?   Yes  Weekly port films approved?   Yes  Change requests noted on port film?   No  Patient setup and plan reviewed?   Yes    Chart Reviewed:  Continue current treatment plan?   Yes  Treatment plan change requested?   No  CBC reviewed?   No  Concurrent Chemo?   No    Objective     Toxicities:   Grade erythema     Review of Systems       There were no vitals filed for this visit.    No flowsheet data found.    Physical Exam        Problem Summary List    Diagnosis:     Diagnosis Plan   1. 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      No past surgical history on file.      Current Outpatient Prescriptions on File Prior to Visit   Medication Sig Dispense Refill   • acetaminophen (TYLENOL) 325 MG tablet Take by mouth.     • alendronate (FOSAMAX) 70 MG tablet Take 1 tablet by mouth Every 7 (Seven) Days. 12 tablet 3   • celecoxib (CeleBREX) 200 MG capsule Take 1 capsule by mouth Daily. 90 capsule 3   • desonide (DESOWEN) 0.05 % cream Apply topically.     • famotidine (PEPCID) 20 MG tablet Take by mouth daily.     • Glucosamine HCl 500 MG tablet Take by mouth.     • HYDROcodone-acetaminophen (NORCO) 7.5-325 MG per tablet Take 1 tablet by mouth Every 8 (Eight) Hours As Needed for Moderate Pain (4-6). 90 tablet 0   • Multiple Vitamin  (MULTI-VITAMIN) tablet Take by mouth.     • mupirocin (BACTROBAN) 2 % ointment Apply sparingly to the biopsy area 2 times a day  3   • ramipril (ALTACE) 10 MG capsule TAKE 2 CAPSULES DAILY 180 capsule 0   • senna-docusate (PERICOLACE) 8.6-50 MG per tablet Take by mouth.     • simvastatin (ZOCOR) 20 MG tablet Take 1 tablet by mouth Every Night. 90 tablet 3   • valsartan (DIOVAN) 160 MG tablet Take 1 tablet by mouth Daily. 90 tablet 3     No current facility-administered medications on file prior to visit.        No Known Allergies      Referring Provider:    No referring provider defined for this encounter.    Oncologist:  No primary care provider on file.      Seen and approved by:  Karla Bush MD  09/20/2017

## 2017-09-22 NOTE — PROGRESS NOTES
HPI  Krzysztof Ward is a 88 y.o. male who is here for follow up of hypertension and chronic back pain.  Patient reports pain is well controlled with occasional pain medications usually just takes 1 at night to help with sleep.  Also a grandson is a chiropractor and has been helping with his back pain.  Currently undergoing radiation therapy for a squamous cell cancer apparently in the ear canal.  Patient has very pale scan and has ongoing issues with recurrent skin cancers.  Overall patient seems to be doing extremely well on current regimen.      Review of Systems   Musculoskeletal: Positive for arthralgias and back pain.   Skin:        Skin cancer history currently undergoing radiation therapy         No past medical history on file.    No past surgical history on file.    No family history on file.    Social History     Social History   • Marital status:      Spouse name: N/A   • Number of children: N/A   • Years of education: N/A     Occupational History   • Not on file.     Social History Main Topics   • Smoking status: Former Smoker   • Smokeless tobacco: Former User      Comment: quit 1962   • Alcohol use Yes      Comment: a beer now and then   • Drug use: Yes     Special: Hydrocodone   • Sexual activity: No     Other Topics Concern   • Not on file     Social History Narrative         Physical Exam   Constitutional: He is oriented to person, place, and time. He appears well-developed and well-nourished.   HENT:   Head: Normocephalic.   Ears:    Mouth/Throat: Oropharynx is clear and moist.   Eyes: Conjunctivae are normal. Pupils are equal, round, and reactive to light.   Neck: Normal range of motion. Neck supple.   Cardiovascular: Normal rate, regular rhythm and normal heart sounds.    Pulmonary/Chest: Effort normal. No respiratory distress.   Abdominal: Soft. He exhibits no distension. There is no tenderness.   Musculoskeletal: He exhibits no edema or deformity.   Neurological: He is alert and oriented  to person, place, and time. He exhibits normal muscle tone. Coordination normal.   Skin: Skin is warm and dry. There is pallor.   Evidence of multiple previous skin cancers with removals and other treatments   Psychiatric: He has a normal mood and affect. His behavior is normal. Judgment and thought content normal.   Nursing note and vitals reviewed.        Assessment/Plan    Krzysztof was seen today for hyperlipidemia and hypertension.    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      Patient is here mostly for continuation of pain medication for his chronic back pain.  Seems to be under good control with current medications as well as chiropractic treatments.  Patient remains amazingly functional and doing well.  Currently undergoing radiation therapy for skin cancer noted above.  Continue current regimen including follow-up every 3 months.    This note includes information entered using a voice recognition dictation system.  Though reviewed, some nonsensible errors may remain.

## 2017-10-02 NOTE — ED PROVIDER NOTES
I supervised care provided by the midlevel provider.    We have discussed this patient's history, physical exam, and treatment plan.   I have reviewed the note and personally saw and examined the patient and agree with the plan of care.    Pt presents to the ED c/o low back pain s/p mechanical fall sustained about 3 days ago. Pt reports that while he was grabbing for his mailbox on his porch, pt lost his balance and fell backwards, landing on his lower back. Pt denies head injury, LOC, abd pain, CP, dyspnea, nausea, vomiting, neck pain, and bilateral hip pain. On physical exam, hips are nontender bilaterally. Pt's L-Spine X-Ray shows degenerative changes, but no acute compression deformity.            Documentation assistance provided by Sayra Wilburn. Information recorded by the scribe was done at my direction and has been verified and validated by me.     Entered by Sayra Wilburn, acting as scribe for Dr. Marbella MD.        Sayra Wilburn  10/02/17 1150       Pieter Tyson MD  10/02/17 2636

## 2017-10-02 NOTE — ED PROVIDER NOTES
EMERGENCY DEPARTMENT ENCOUNTER    CHIEF COMPLAINT  Chief Complaint: pain post fall  History given by: patient, family  History limited by: N/A  Room Number: 35/35  PMD: Willie Ramirez MD  ENT specialist- Dr Núñez     HPI:  Pt is a 88 y.o. male who presents s/p fall that occurred about 3 days ago. He states that as he was reaching over to grab an object off of his porch, he lost his balance and fell backwards. Since then, he has had diffuse low back pain that is exacerbated by movement. He notes that he did not have any prodrome before the fall (denies dizziness, syncope, chest pain, trouble breathing, abd pain, or any other sx). From the fall, he denies blow to head, loss of consciousness, headache, neck pain, abd pain, chest pain, trouble breathing, pain and difficulty with urination, bladder dysfunction, bowel dysfunction, sensory loss, motor loss, saddle anesthesia, and sustaining any other injury. He has not had fevers. Pt states that even though he has taken hydrocodone, his pain has not significantly improved. Per family, pt has been at his baseline mental status and has been able to walk since the fall (uses cane at baseline). Past Medical History of skin cancer of left ear and left auditory canal (currently on radiation therapy) and HTN.     Duration: fall occurred about 3 days ago  Timing: intermittent  Location: diffuse low back  Radiation: none  Quality: pain  Intensity/Severity: moderate  Progression: unchanged  Associated Symptoms: diffuse low back pain  Aggravating Factors: movement  Alleviating Factors: resting  Previous Episodes: none mentioned  Treatment before arrival: Pt states that even though he has taken hydrocodone, his pain has not significantly improved.    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 No pertinent family history.    SOCIAL HISTORY  Social History     Social History   • Marital status:      Spouse name: N/A   • Number of children: N/A   • Years of education: N/A     Occupational History   • Not on file.     Social History Main Topics   • Smoking status: Former Smoker   • Smokeless tobacco: Former User      Comment: quit 1962   • Alcohol use Yes      Comment: a beer now and then   • Drug use: Yes     Special: Hydrocodone   • Sexual activity: No     Other Topics Concern   • Not on file     Social History Narrative   • No narrative on file         ALLERGIES  Review of patient's allergies indicates no known allergies.    REVIEW OF SYSTEMS  Review of Systems   Constitutional: Negative for chills and fever.   HENT: Negative for sore throat.    Eyes: Negative for visual disturbance.   Respiratory: Negative for shortness of breath.    Cardiovascular: Negative for chest pain.   Gastrointestinal: Negative for abdominal pain, nausea and vomiting.        No bowel dysfunction   Genitourinary: Negative for difficulty urinating and dysuria.        No bladder dysfunction   Musculoskeletal: Positive for back pain (diffuse low back pain). Negative for neck pain.    Skin: Negative for rash.   Neurological: Negative for weakness, numbness and headaches.        No saddle anesthesia   Psychiatric/Behavioral: The patient is not nervous/anxious.        PHYSICAL EXAM  ED Triage Vitals   Temp Heart Rate Resp BP SpO2   10/02/17 0834 10/02/17 0834 10/02/17 0834 10/02/17 0913 10/02/17 0834   97.2 °F (36.2 °C) 112 16 165/83 95 % WNL     Physical Exam   Constitutional: He is oriented to person, place, and time. No distress.   HENT:   Head: Normocephalic and atraumatic.   Mouth/Throat: Mucous membranes are normal.   Eyes: EOM are normal. Pupils are equal, round, and reactive to light. No scleral icterus.   Neck: Normal range of motion.   No c-spine tenderness   Cardiovascular: Normal rate, regular rhythm and normal heart sounds.    Pulses:       Dorsalis pedis pulses are 2+ on the right side, and 2+ on the left side.        Posterior tibial pulses are 2+ on the right side, and 2+ on the left side.   Pulmonary/Chest: Effort normal and breath sounds normal. No respiratory distress. He exhibits no tenderness.   Abdominal: Soft. There is no tenderness.   Musculoskeletal:   Lumbar tenderness, no t-spine tenderness, FROM to all extremities, negative straight leg raises bilaterally, NV intact distally to all extremities    Neurological: He is alert and oriented to person, place, and time. He has normal motor skills and normal sensation.   Reflex Scores:       Patellar reflexes are 2+ on the right side and 2+ on the left side.       Achilles reflexes are 2+ on the right side and 2+ on the left side.  Sensation and motor function intact to BLE   Skin: Skin is warm and dry.   Psychiatric: Mood and affect normal.   Nursing note and vitals reviewed.      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  Multilevel lumbar disc degeneration with endplate hypertrophy, no acute  compression deformity or subluxation. Multilevel facet hypertrophy.  There is thoracolumbar scoliosis convexity towards the right.     Calcifications within the right upper quadrant have the appearance of  multiple gallstones.     CONCLUSION: Scoliosis with substantial degenerative change involving the  lumbar spine, no acute compression deformity. Collapse with again  vertebral plasty cement noted in the T12 vertebrae.     This report was finalized on 10/2/2017 11:16 AM by Dr. Satish Keith MD.            I ordered the above noted radiological studies and reviewed the images on the PACS system.         PROGRESS AND CONSULTS  9:49 AM- Ordered percocet and baclofen for pain.   11:45 AM- Reviewed pt's history and workup with Dr. Tyson.  At bedside evaluation, they agree with the plan of care.  12:01 PM- Per RN, in ED, pt ambulated without difficulty with his cane.   12:02 PM- Rechecked pt. He states that he feels better after ED treatments. He is resting comfortably and is in no acute distress. Reviewed implications of results (including l-spine xray findings (degenerative changes of l-spine but no acute process)), diagnosis, meds, responsibility to follow up, warning signs and symptoms of possible worsening, potential complications and reasons to return to ER with patient.  Discussed all results and noted any abnormalities with patient.  Discussed absolute need to recheck abnormalities and condition with PMD. Informed pt of plan to prescribe muscle relaxer. Advised pt to apply ice to back and to perform activity as tolerated.   Discussed plan for discharge, as there is no emergent indication for admission.  Pt is agreeable and understands need for follow up and repeat testing.  Pt is aware that discharge does not mean that nothing is wrong but it indicates no  "emergency is present.  Pt is discharged with instructions to follow up with primary care doctor to have their blood pressure rechecked.       DIAGNOSIS  Final diagnoses:   Lumbar contusion, initial encounter   Fall, initial encounter       FOLLOW UP   Willie Ramirez MD  [address] [facility] [id number] [phone]    Call in 1 day        RX     Medication List           baclofen 10 MG tablet   Commonly known as:  LIORESAL   Take 1 tablet by mouth 3 (Three) Times a Day As Needed for Muscle Spasms.        COURSE & MEDICAL DECISION MAKING  Pertinent Imaging studies that were ordered and reviewed are noted above.  Results were reviewed/discussed with the patient and they were also made aware of online assess.       MEDICATIONS GIVEN IN ER  Medications   oxyCODONE-acetaminophen (PERCOCET) 5-325 MG per tablet 1 tablet (1 tablet Oral Given 10/2/17 1001)   baclofen (LIORESAL) tablet 10 mg (10 mg Oral Given 10/2/17 0959)       /78 (Patient Position: Lying)  Pulse 90  Temp 97.2 °F (36.2 °C)  Resp 16  Ht 71\" (180.3 cm)  Wt 193 lb (87.5 kg)  SpO2 95%  BMI 26.92 kg/m2      I personally reviewed the past medical history, past surgical history, social history, family history, current medications and allergies as they appear in this chart.  The scribe's note accurately reflects the work and decisions made by me.     Documentation assistance provided by nick Wilburn for ROBERT Arciniega on 10/2/2017 at 12:07 PM. Information recorded by the scribe was done at my direction and has been verified and validated by me.     Laxmi Wilburn  10/02/17 1216       AIXA Irwin  10/02/17 1720    "

## 2017-10-02 NOTE — ED NOTES
Pt states on Friday he was going to retrieve mail from his mailbox, mailbox became dislodged from the ground and fell towards patient and pt fell to the ground. Denies hitting head. Pt reports diffuse lower back pain and stiffness, with tenderness upon palpation. Pt ambulatory. NAD noted. Pt a&ox4. Denies numbness/tingling     Marilia Barrett RN  10/02/17 09

## 2017-10-02 NOTE — DISCHARGE INSTRUCTIONS
Medications as ordered  Continue current home medications  Ice to back  Activity as tolerated  Follow up with pmd as needed  Return to er for numbness/tingling to legs, loss of bowel/bladder function, increased pain or any new or worsening symptoms

## 2017-10-03 NOTE — PROGRESS NOTES
Physician Weekly Management Note    Diagnosis:     Diagnosis Plan   1. 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   Yes  New medications given?   No  Problemlist reviewed?   Yes  Problem added?   No  Issues raised requiring referral to support services - task assigned to:  radha    Technical aspects reviewed:  Weekly OBI approved?   Yes  Weekly port films approved?   Yes  Change requests noted on port film?   No  Patient setup and plan reviewed?   Yes    Chart Reviewed:  Continue current treatment plan?   No  Treatment plan change requested?   Yes  CBC reviewed?   No  Concurrent Chemo?   No    Objective     Toxicities:   Grade 2 erythema     Review of Systems   Constitutional: Negative.    HENT: Positive for ear discharge and tinnitus.           Vitals:    10/03/17 1446   BP: 128/70   Pulse: 63   SpO2: 95%       No flowsheet data found.    Physical Exam   Constitutional: He appears well-developed and well-nourished.   HENT:   Erythema left ear, no skin breakdown, lesion is smaller in ear canal           Problem Summary List    Diagnosis:     Diagnosis Plan   1. 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     • alendronate (FOSAMAX) 70 MG tablet Take 1 tablet by mouth Every 7 (Seven) Days. 12 tablet 3   • baclofen (LIORESAL) 10 MG tablet Take 1 tablet by mouth 3 (Three) Times a Day As Needed for Muscle Spasms. 15 tablet 0   • celecoxib (CeleBREX) 200 MG capsule Take 1 capsule by mouth Daily. 90 capsule 3   • desonide (DESOWEN) 0.05 % cream Apply topically.     • famotidine (PEPCID) 20 MG tablet Take by mouth daily.     • Glucosamine HCl 500 MG tablet Take by mouth.     • HYDROcodone-acetaminophen (NORCO) 7.5-325 MG per tablet Take 1 tablet by mouth Every 8 (Eight) Hours As Needed for Moderate Pain . 90 tablet 0   • Multiple Vitamin (MULTI-VITAMIN) tablet Take by mouth.     • mupirocin (BACTROBAN) 2 % ointment Apply sparingly to the biopsy area 2 times a day  3   • ramipril (ALTACE) 10 MG capsule TAKE 2 CAPSULES DAILY 180 capsule 0   • senna-docusate (PERICOLACE) 8.6-50 MG per tablet Take by mouth.     • simvastatin (ZOCOR) 20 MG tablet Take 1 tablet by mouth Every Night. 90 tablet 3   • valsartan (DIOVAN) 160 MG tablet Take 1 tablet by mouth Daily. 90 tablet 3     No current facility-administered medications on file prior to visit.        No Known Allergies      Referring Provider:    No referring provider defined for this encounter.    Oncologist:  No primary care provider on file.      Seen and approved by:  Karla Bush MD  10/03/2017

## 2017-10-03 NOTE — PROGRESS NOTES
Physician Weekly Management Note    Diagnosis:  Squamous cell carcinoma of ear canal    RT Details:  Left ear canal fx 22    Notes on Treatment course, Films, Medical progress:  *doing well, mild erythema left ear, some clear drainage but slwoing down    Weekly Management:  Medication reviewed?   Yes  New medications given?   No  Problemlist reviewed?   Yes  Problem added?   No  Issues raised requiring referral to support services - task assigned to:  radha    Technical aspects reviewed:  Weekly OBI approved?   Yes  Weekly port films approved?   Yes  Change requests noted on port film?   No  Patient setup and plan reviewed?   Yes    Chart Reviewed:  Continue current treatment plan?   Yes  Treatment plan change requested?   No  CBC reviewed?   No  Concurrent Chemo?   No    Objective     Toxicities:   Grade 1 erythema     Review of Systems   Constitutional: Negative.           Vitals:    09/25/17 1410   BP: 152/86   Pulse: 82   Resp: 16   Temp: 98 °F (36.7 °C)   SpO2: 96%       No flowsheet data found.    Physical Exam   Constitutional: He appears well-developed and well-nourished.   HENT:   Mild eyrthema left ear           Problem Summary List    Diagnosis:  No diagnosis found.  Pathology:   Squamous cell    Past Medical History:   Diagnosis Date   • Cancer    • H/O degenerative disc disease    • Hypertension    • Osteoporosis    • Skin cancer          Past Surgical History:   Procedure Laterality Date   • BACK SURGERY     • EXTERNAL EAR SURGERY     • REPLACEMENT TOTAL KNEE           Current Outpatient Prescriptions on File Prior to Visit   Medication Sig Dispense Refill   • acetaminophen (TYLENOL) 325 MG tablet Take by mouth.     • alendronate (FOSAMAX) 70 MG tablet Take 1 tablet by mouth Every 7 (Seven) Days. 12 tablet 3   • celecoxib (CeleBREX) 200 MG capsule Take 1 capsule by mouth Daily. 90 capsule 3   • desonide (DESOWEN) 0.05 % cream Apply topically.     • famotidine (PEPCID) 20 MG tablet Take by mouth daily.      • Glucosamine HCl 500 MG tablet Take by mouth.     • HYDROcodone-acetaminophen (NORCO) 7.5-325 MG per tablet Take 1 tablet by mouth Every 8 (Eight) Hours As Needed for Moderate Pain . 90 tablet 0   • Multiple Vitamin (MULTI-VITAMIN) tablet Take by mouth.     • mupirocin (BACTROBAN) 2 % ointment Apply sparingly to the biopsy area 2 times a day  3   • ramipril (ALTACE) 10 MG capsule TAKE 2 CAPSULES DAILY 180 capsule 0   • senna-docusate (PERICOLACE) 8.6-50 MG per tablet Take by mouth.     • simvastatin (ZOCOR) 20 MG tablet Take 1 tablet by mouth Every Night. 90 tablet 3   • valsartan (DIOVAN) 160 MG tablet Take 1 tablet by mouth Daily. 90 tablet 3     No current facility-administered medications on file prior to visit.        No Known Allergies      Referring Provider:    No referring provider defined for this encounter.    Oncologist:  No primary care provider on file.      Seen and approved by:  Karla Bush MD  09/25/2017

## 2017-10-04 NOTE — TELEPHONE ENCOUNTER
Spoke with pt today in f/u and he states his back is still sore, however, he is cont to take the medication he was prescribed as directed. Pt also states he has 2 weeks of radiation therapy left and will daya a f/u appt after complete. No other questions or concerns voiced by pt at this time. Mirella FAJARDO

## 2017-10-09 NOTE — PROGRESS NOTES
Physician Weekly Management Note    Diagnosis:     Diagnosis Plan   1. 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   Yes  New medications given?   No  Problemlist reviewed?   Yes  Problem added?   No  Issues raised requiring referral to support services - task assigned to:  radha    Technical aspects reviewed:  Weekly OBI approved?   Yes  Weekly port films approved?   Yes  Change requests noted on port film?   No  Patient setup and plan reviewed?   Yes    Chart Reviewed:  Continue current treatment plan?   Yes  Treatment plan change requested?   No  CBC reviewed?   No  Concurrent Chemo?   No    Objective     Toxicities:   Grade 2/3 erythema     Review of Systems   Constitutional: Negative.    Skin: Positive for color change and rash.          Vitals:    10/09/17 1457   BP: (!) 187/85   Pulse: 76   Resp: 16   SpO2: 100%       No flowsheet data found.    Physical Exam   Constitutional: He appears well-developed and well-nourished.   HENT:   Ears:            Problem Summary List    Diagnosis:     Diagnosis Plan   1. 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     •  alendronate (FOSAMAX) 70 MG tablet Take 1 tablet by mouth Every 7 (Seven) Days. 12 tablet 3   • baclofen (LIORESAL) 10 MG tablet Take 1 tablet by mouth 3 (Three) Times a Day As Needed for Muscle Spasms. 15 tablet 0   • celecoxib (CeleBREX) 200 MG capsule Take 1 capsule by mouth Daily. 90 capsule 3   • desonide (DESOWEN) 0.05 % cream Apply topically.     • famotidine (PEPCID) 20 MG tablet Take by mouth daily.     • Glucosamine HCl 500 MG tablet Take by mouth.     • HYDROcodone-acetaminophen (NORCO) 7.5-325 MG per tablet Take 1 tablet by mouth Every 8 (Eight) Hours As Needed for Moderate Pain . 90 tablet 0   • Multiple Vitamin (MULTI-VITAMIN) tablet Take by mouth.     • mupirocin (BACTROBAN) 2 % ointment Apply sparingly to the biopsy area 2 times a day  3   • ramipril (ALTACE) 10 MG capsule TAKE 2 CAPSULES DAILY 180 capsule 0   • senna-docusate (PERICOLACE) 8.6-50 MG per tablet Take by mouth.     • simvastatin (ZOCOR) 20 MG tablet Take 1 tablet by mouth Every Night. 90 tablet 3   • valsartan (DIOVAN) 160 MG tablet Take 1 tablet by mouth Daily. 90 tablet 3     No current facility-administered medications on file prior to visit.        No Known Allergies      Referring Provider:    No referring provider defined for this encounter.    Oncologist:  No primary care provider on file.      Seen and approved by:  Karla Bush MD  10/09/2017

## 2017-11-09 NOTE — PROGRESS NOTES
Subjective     No ref. provider found     Diagnosis Plan   1. 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 He returns today for follow up now 4 weeks out from completion of his radiation therapy for T2N0 squamous cell carcinoma of the left ear canal.  He completed a dose of 66Gy to the left ear canal on 10/12/17.  He has been doing well since I last saw him.  He states the drainage from the left ear has decreased and his skin is healing nicely.  He denies any tinnitus but does report decreased hearing but this was present prior to radiaton .        Review of Systems   Constitutional: Negative.    HENT: Positive for hearing loss. Negative for tinnitus.          Past Medical History:   Diagnosis Date   • Cancer    • H/O degenerative disc disease    • Hypertension    • Osteoporosis    • Skin cancer          Past Surgical History:   Procedure Laterality Date   • BACK SURGERY     • EXTERNAL EAR SURGERY     • REPLACEMENT TOTAL KNEE           Social History     Social History   • Marital status:      Spouse name: N/A   • Number of children: N/A   • Years of education: N/A     Social History Main Topics   • Smoking status: Former Smoker   • Smokeless tobacco: Former User      Comment: quit 1962   • Alcohol use Yes      Comment: a beer now and then   • Drug use: Yes     Special: Hydrocodone   • Sexual activity: No     Other Topics Concern   • Not on file     Social History Narrative   • No narrative on file         No family history on file.       Objective    Physical Exam   Constitutional: He appears well-developed and well-nourished.   HENT:   Head: Normocephalic.   Left ear canal lesion is flatter, nearly resolved, post radiation changes including erythema   Eyes: EOM are normal.   Neck: Neck supple.    Lymphadenopathy:     He has no cervical adenopathy.         Current Outpatient Prescriptions on File Prior to Visit   Medication Sig Dispense Refill   • acetaminophen (TYLENOL) 325 MG tablet Take by mouth.     • alendronate (FOSAMAX) 70 MG tablet Take 1 tablet by mouth Every 7 (Seven) Days. 12 tablet 3   • baclofen (LIORESAL) 10 MG tablet Take 1 tablet by mouth 3 (Three) Times a Day As Needed for Muscle Spasms. 15 tablet 0   • celecoxib (CeleBREX) 200 MG capsule Take 1 capsule by mouth Daily. 90 capsule 3   • desonide (DESOWEN) 0.05 % cream Apply topically.     • famotidine (PEPCID) 20 MG tablet Take by mouth daily.     • Glucosamine HCl 500 MG tablet Take by mouth.     • HYDROcodone-acetaminophen (NORCO) 7.5-325 MG per tablet Take 1 tablet by mouth Every 8 (Eight) Hours As Needed for Moderate Pain . 90 tablet 0   • Multiple Vitamin (MULTI-VITAMIN) tablet Take by mouth.     • mupirocin (BACTROBAN) 2 % ointment Apply sparingly to the biopsy area 2 times a day  3   • ramipril (ALTACE) 10 MG capsule TAKE 2 CAPSULES DAILY 180 capsule 0   • senna-docusate (PERICOLACE) 8.6-50 MG per tablet Take by mouth.     • simvastatin (ZOCOR) 20 MG tablet Take 1 tablet by mouth Every Night. 90 tablet 3   • valsartan (DIOVAN) 160 MG tablet Take 1 tablet by mouth Daily. 90 tablet 3     No current facility-administered medications on file prior to visit.        ALLERGIES:  No Known Allergies    BP (!) 190/85  Pulse 71  Temp 97 °F (36.1 °C) (Oral)   Resp 16  SpO2 96%     No flowsheet data found.      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  I have scheduled him to see Dr. Núñez, his ENT, later this month for evalaution.  I will see him back in 2 months or sooner if necessary.       Thank you very much for allowing me to participate in the care of this very pleasant patient.    Sincerely,      Karla Bush,  MD

## 2017-12-04 NOTE — TELEPHONE ENCOUNTER
Future appointment 01/02/2018.    Last O.V. 09/22/2017.  Live 06/19/2017 (UPDATED).  Filled 10/02/2017.    THANK YOU.    ----- Message from Ann Dietz sent at 12/4/2017  1:45 PM EST -----  Patient would like to get a refill on Hydrocodone 7.5- 325 #90    Thanks,  Marilia

## 2018-01-01 ENCOUNTER — APPOINTMENT (OUTPATIENT)
Dept: MRI IMAGING | Facility: HOSPITAL | Age: 83
End: 2018-01-01

## 2018-01-01 ENCOUNTER — OFFICE VISIT (OUTPATIENT)
Dept: RADIATION ONCOLOGY | Facility: HOSPITAL | Age: 83
End: 2018-01-01

## 2018-01-01 ENCOUNTER — APPOINTMENT (OUTPATIENT)
Dept: CT IMAGING | Facility: HOSPITAL | Age: 83
End: 2018-01-01

## 2018-01-01 ENCOUNTER — HOSPITAL ENCOUNTER (EMERGENCY)
Facility: HOSPITAL | Age: 83
Discharge: NURSING, REHAB OR LONG-TERM CARE | End: 2018-04-01
Attending: EMERGENCY MEDICINE | Admitting: EMERGENCY MEDICINE

## 2018-01-01 ENCOUNTER — OFFICE VISIT (OUTPATIENT)
Dept: FAMILY MEDICINE CLINIC | Facility: CLINIC | Age: 83
End: 2018-01-01

## 2018-01-01 ENCOUNTER — APPOINTMENT (OUTPATIENT)
Dept: CARDIOLOGY | Facility: HOSPITAL | Age: 83
End: 2018-01-01
Attending: INTERNAL MEDICINE

## 2018-01-01 ENCOUNTER — APPOINTMENT (OUTPATIENT)
Dept: GENERAL RADIOLOGY | Facility: HOSPITAL | Age: 83
End: 2018-01-01

## 2018-01-01 ENCOUNTER — OFFICE VISIT (OUTPATIENT)
Dept: CARDIOLOGY | Facility: CLINIC | Age: 83
End: 2018-01-01

## 2018-01-01 ENCOUNTER — HOSPITAL ENCOUNTER (INPATIENT)
Facility: HOSPITAL | Age: 83
LOS: 12 days | Discharge: NURSING, REHAB OR LONG-TERM CARE | End: 2018-03-13
Attending: FAMILY MEDICINE | Admitting: INTERNAL MEDICINE

## 2018-01-01 VITALS
HEIGHT: 68 IN | DIASTOLIC BLOOD PRESSURE: 74 MMHG | WEIGHT: 168 LBS | HEART RATE: 66 BPM | BODY MASS INDEX: 25.46 KG/M2 | SYSTOLIC BLOOD PRESSURE: 140 MMHG | TEMPERATURE: 97.5 F | RESPIRATION RATE: 13 BRPM | OXYGEN SATURATION: 96 %

## 2018-01-01 VITALS
SYSTOLIC BLOOD PRESSURE: 175 MMHG | HEIGHT: 72 IN | WEIGHT: 182.7 LBS | DIASTOLIC BLOOD PRESSURE: 72 MMHG | HEART RATE: 64 BPM | RESPIRATION RATE: 16 BRPM | BODY MASS INDEX: 24.75 KG/M2 | TEMPERATURE: 98 F | OXYGEN SATURATION: 95 %

## 2018-01-01 VITALS
OXYGEN SATURATION: 96 % | DIASTOLIC BLOOD PRESSURE: 82 MMHG | SYSTOLIC BLOOD PRESSURE: 203 MMHG | HEART RATE: 68 BPM | RESPIRATION RATE: 16 BRPM

## 2018-01-01 VITALS
OXYGEN SATURATION: 99 % | SYSTOLIC BLOOD PRESSURE: 124 MMHG | HEART RATE: 69 BPM | WEIGHT: 197 LBS | HEIGHT: 71 IN | BODY MASS INDEX: 27.58 KG/M2 | TEMPERATURE: 97.5 F | DIASTOLIC BLOOD PRESSURE: 80 MMHG

## 2018-01-01 VITALS
SYSTOLIC BLOOD PRESSURE: 227 MMHG | DIASTOLIC BLOOD PRESSURE: 89 MMHG | RESPIRATION RATE: 16 BRPM | OXYGEN SATURATION: 96 % | HEART RATE: 65 BPM

## 2018-01-01 VITALS
BODY MASS INDEX: 24.39 KG/M2 | HEART RATE: 70 BPM | WEIGHT: 184 LBS | DIASTOLIC BLOOD PRESSURE: 60 MMHG | RESPIRATION RATE: 20 BRPM | HEIGHT: 73 IN | SYSTOLIC BLOOD PRESSURE: 100 MMHG

## 2018-01-01 DIAGNOSIS — I10 ESSENTIAL HYPERTENSION: ICD-10-CM
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
## 2018-01-01 DIAGNOSIS — C44.229 SQUAMOUS CELL CARCINOMA OF SKIN OF LEFT EAR AND EXTERNAL AUDITORY CANAL: Primary | ICD-10-CM

## 2018-01-01 DIAGNOSIS — C44.229 SQUAMOUS CELL CARCINOMA OF SKIN OF LEFT EAR AND EXTERNAL AUDITORY CANAL: ICD-10-CM

## 2018-01-01 DIAGNOSIS — S50.02XA CONTUSION OF LEFT ELBOW, INITIAL ENCOUNTER: ICD-10-CM

## 2018-01-01 DIAGNOSIS — G89.29 CHRONIC LOW BACK PAIN, UNSPECIFIED BACK PAIN LATERALITY, WITH SCIATICA PRESENCE UNSPECIFIED: ICD-10-CM

## 2018-01-01 DIAGNOSIS — M54.5 CHRONIC LOW BACK PAIN, UNSPECIFIED BACK PAIN LATERALITY, WITH SCIATICA PRESENCE UNSPECIFIED: ICD-10-CM

## 2018-01-01 DIAGNOSIS — S80.02XA CONTUSION OF LEFT KNEE, INITIAL ENCOUNTER: ICD-10-CM

## 2018-01-01 DIAGNOSIS — N39.0 ACUTE UTI: ICD-10-CM

## 2018-01-01 DIAGNOSIS — Z74.09 IMPAIRED FUNCTIONAL MOBILITY, BALANCE, GAIT, AND ENDURANCE: ICD-10-CM

## 2018-01-01 DIAGNOSIS — T79.6XXA TRAUMATIC RHABDOMYOLYSIS, INITIAL ENCOUNTER (HCC): ICD-10-CM

## 2018-01-01 DIAGNOSIS — M54.2 NECK PAIN: ICD-10-CM

## 2018-01-01 DIAGNOSIS — I48.3 TYPICAL ATRIAL FLUTTER (HCC): Primary | ICD-10-CM

## 2018-01-01 DIAGNOSIS — R53.1 WEAKNESS: ICD-10-CM

## 2018-01-01 DIAGNOSIS — S09.90XA INJURY OF HEAD, INITIAL ENCOUNTER: Primary | ICD-10-CM

## 2018-01-01 DIAGNOSIS — Z79.899 HIGH RISK MEDICATION USE: ICD-10-CM

## 2018-01-01 DIAGNOSIS — S22.32XA CLOSED FRACTURE OF ONE RIB OF LEFT SIDE, INITIAL ENCOUNTER: ICD-10-CM

## 2018-01-01 DIAGNOSIS — M81.0 AGE-RELATED OSTEOPOROSIS WITHOUT CURRENT PATHOLOGICAL FRACTURE: ICD-10-CM

## 2018-01-01 DIAGNOSIS — G51.0 FACIAL PARALYSIS/BELLS PALSY: ICD-10-CM

## 2018-01-01 DIAGNOSIS — G51.0 FACIAL NERVE PALSY: ICD-10-CM

## 2018-01-01 DIAGNOSIS — R97.20 ELEVATED PSA: ICD-10-CM

## 2018-01-01 DIAGNOSIS — E78.5 HYPERLIPIDEMIA, UNSPECIFIED HYPERLIPIDEMIA TYPE: Primary | ICD-10-CM

## 2018-01-01 DIAGNOSIS — I48.92 ATRIAL FLUTTER, UNSPECIFIED TYPE (HCC): Primary | ICD-10-CM

## 2018-01-01 LAB
ALBUMIN SERPL-MCNC: 2.5 G/DL (ref 3.5–5.2)
ALBUMIN SERPL-MCNC: 3.1 G/DL (ref 3.5–5.2)
ALBUMIN SERPL-MCNC: 3.8 G/DL (ref 3.5–5.2)
ALBUMIN SERPL-MCNC: 4 G/DL (ref 3.5–5.2)
ALBUMIN/GLOB SERPL: 1 G/DL
ALBUMIN/GLOB SERPL: 1.4 G/DL
ALP SERPL-CCNC: 107 U/L (ref 39–117)
ALP SERPL-CCNC: 87 U/L (ref 39–117)
ALT SERPL W P-5'-P-CCNC: 27 U/L (ref 1–41)
ALT SERPL-CCNC: 12 U/L (ref 1–41)
ANION GAP SERPL CALCULATED.3IONS-SCNC: 10.6 MMOL/L
ANION GAP SERPL CALCULATED.3IONS-SCNC: 11 MMOL/L
ANION GAP SERPL CALCULATED.3IONS-SCNC: 11.2 MMOL/L
ANION GAP SERPL CALCULATED.3IONS-SCNC: 11.4 MMOL/L
ANION GAP SERPL CALCULATED.3IONS-SCNC: 11.8 MMOL/L
ANION GAP SERPL CALCULATED.3IONS-SCNC: 11.9 MMOL/L
ANION GAP SERPL CALCULATED.3IONS-SCNC: 12 MMOL/L
ANION GAP SERPL CALCULATED.3IONS-SCNC: 9.4 MMOL/L
AST SERPL-CCNC: 17 U/L (ref 1–40)
AST SERPL-CCNC: 34 U/L (ref 1–40)
BACTERIA SPEC AEROBE CULT: ABNORMAL
BACTERIA SPEC AEROBE CULT: ABNORMAL
BACTERIA SPEC AEROBE CULT: NORMAL
BACTERIA SPEC AEROBE CULT: NORMAL
BACTERIA UR QL AUTO: ABNORMAL /HPF
BASOPHILS # BLD AUTO: 0.01 10*3/MM3 (ref 0–0.2)
BASOPHILS # BLD AUTO: 0.02 10*3/MM3 (ref 0–0.2)
BASOPHILS NFR BLD AUTO: 0.1 % (ref 0–1.5)
BASOPHILS NFR BLD AUTO: 0.2 % (ref 0–1.5)
BASOPHILS NFR BLD AUTO: 0.2 % (ref 0–1.5)
BASOPHILS NFR BLD AUTO: 0.3 % (ref 0–1.5)
BH CV ECHO MEAS - ACS: 2.5 CM
BH CV ECHO MEAS - AO MEAN PG (FULL): 2 MMHG
BH CV ECHO MEAS - AO MEAN PG: 5 MMHG
BH CV ECHO MEAS - AO ROOT AREA (BSA CORRECTED): 1.3
BH CV ECHO MEAS - AO ROOT AREA: 5.3 CM^2
BH CV ECHO MEAS - AO ROOT DIAM: 2.6 CM
BH CV ECHO MEAS - AO V2 MAX: 166 CM/SEC
BH CV ECHO MEAS - AO V2 MEAN: 100 CM/SEC
BH CV ECHO MEAS - AO V2 VTI: 29.5 CM
BH CV ECHO MEAS - ASC AORTA: 2.9 CM
BH CV ECHO MEAS - AVA(I,A): 2.6 CM^2
BH CV ECHO MEAS - AVA(I,D): 2.6 CM^2
BH CV ECHO MEAS - BSA(HAYCOCK): 2 M^2
BH CV ECHO MEAS - BSA: 2 M^2
BH CV ECHO MEAS - BZI_BMI: 24.4 KILOGRAMS/M^2
BH CV ECHO MEAS - BZI_METRIC_HEIGHT: 182.9 CM
BH CV ECHO MEAS - BZI_METRIC_WEIGHT: 81.6 KG
BH CV ECHO MEAS - CONTRAST EF (2CH): 56.7 ML/M^2
BH CV ECHO MEAS - CONTRAST EF 4CH: 65 ML/M^2
BH CV ECHO MEAS - EDV(CUBED): 85.2 ML
BH CV ECHO MEAS - EDV(MOD-SP2): 104 ML
BH CV ECHO MEAS - EDV(MOD-SP4): 103 ML
BH CV ECHO MEAS - EDV(TEICH): 87.7 ML
BH CV ECHO MEAS - EF(CUBED): 71.4 %
BH CV ECHO MEAS - EF(MOD-SP2): 56.7 %
BH CV ECHO MEAS - EF(MOD-SP4): 65 %
BH CV ECHO MEAS - EF(TEICH): 63.3 %
BH CV ECHO MEAS - ESV(CUBED): 24.4 ML
BH CV ECHO MEAS - ESV(MOD-SP2): 45 ML
BH CV ECHO MEAS - ESV(MOD-SP4): 36 ML
BH CV ECHO MEAS - ESV(TEICH): 32.2 ML
BH CV ECHO MEAS - FS: 34.1 %
BH CV ECHO MEAS - IVS/LVPW: 1
BH CV ECHO MEAS - IVSD: 1.1 CM
BH CV ECHO MEAS - LAT PEAK E' VEL: 11 CM/SEC
BH CV ECHO MEAS - LV DIASTOLIC VOL/BSA (35-75): 50.6 ML/M^2
BH CV ECHO MEAS - LV MASS(C)D: 168.9 GRAMS
BH CV ECHO MEAS - LV MASS(C)DI: 82.9 GRAMS/M^2
BH CV ECHO MEAS - LV MEAN PG: 3 MMHG
BH CV ECHO MEAS - LV SYSTOLIC VOL/BSA (12-30): 17.7 ML/M^2
BH CV ECHO MEAS - LV V1 MAX: 128 CM/SEC
BH CV ECHO MEAS - LV V1 MEAN: 73.8 CM/SEC
BH CV ECHO MEAS - LV V1 VTI: 24.7 CM
BH CV ECHO MEAS - LVIDD: 4.4 CM
BH CV ECHO MEAS - LVIDS: 2.9 CM
BH CV ECHO MEAS - LVLD AP2: 9.6 CM
BH CV ECHO MEAS - LVLD AP4: 8.5 CM
BH CV ECHO MEAS - LVLS AP2: 8 CM
BH CV ECHO MEAS - LVLS AP4: 8.1 CM
BH CV ECHO MEAS - LVOT AREA (M): 3.1 CM^2
BH CV ECHO MEAS - LVOT AREA: 3.1 CM^2
BH CV ECHO MEAS - LVOT DIAM: 2 CM
BH CV ECHO MEAS - LVPWD: 1.1 CM
BH CV ECHO MEAS - MED PEAK E' VEL: 8 CM/SEC
BH CV ECHO MEAS - MR MAX PG: 130.9 MMHG
BH CV ECHO MEAS - MR MAX VEL: 572 CM/SEC
BH CV ECHO MEAS - MV DEC SLOPE: 807 CM/SEC^2
BH CV ECHO MEAS - MV DEC TIME: 0.16 SEC
BH CV ECHO MEAS - MV E MAX VEL: 110 CM/SEC
BH CV ECHO MEAS - MV MEAN PG: 2 MMHG
BH CV ECHO MEAS - MV P1/2T MAX VEL: 124 CM/SEC
BH CV ECHO MEAS - MV P1/2T: 45 MSEC
BH CV ECHO MEAS - MV V2 MEAN: 69 CM/SEC
BH CV ECHO MEAS - MV V2 VTI: 18.7 CM
BH CV ECHO MEAS - MVA P1/2T LCG: 1.8 CM^2
BH CV ECHO MEAS - MVA(P1/2T): 4.9 CM^2
BH CV ECHO MEAS - MVA(VTI): 4.1 CM^2
BH CV ECHO MEAS - PA ACC SLOPE: 8.1 CM/SEC^2
BH CV ECHO MEAS - PA ACC TIME: 0.12 SEC
BH CV ECHO MEAS - PA MAX PG (FULL): 0.68 MMHG
BH CV ECHO MEAS - PA MAX PG: 4.3 MMHG
BH CV ECHO MEAS - PA PR(ACCEL): 25 MMHG
BH CV ECHO MEAS - PA V2 MAX: 104 CM/SEC
BH CV ECHO MEAS - PI END-D VEL: 162 CM/SEC
BH CV ECHO MEAS - PVA(V,A): 4.9 CM^2
BH CV ECHO MEAS - PVA(V,D): 4.9 CM^2
BH CV ECHO MEAS - QP/QS: 1.5
BH CV ECHO MEAS - RAP SYSTOLE: 15 MMHG
BH CV ECHO MEAS - RV MAX PG: 3.6 MMHG
BH CV ECHO MEAS - RV MEAN PG: 2 MMHG
BH CV ECHO MEAS - RV V1 MAX: 95.5 CM/SEC
BH CV ECHO MEAS - RV V1 MEAN: 63.6 CM/SEC
BH CV ECHO MEAS - RV V1 VTI: 21.9 CM
BH CV ECHO MEAS - RVOT AREA: 5.3 CM^2
BH CV ECHO MEAS - RVOT DIAM: 2.6 CM
BH CV ECHO MEAS - RVSP: 59.4 MMHG
BH CV ECHO MEAS - SI(AO): 76.9 ML/M^2
BH CV ECHO MEAS - SI(CUBED): 29.8 ML/M^2
BH CV ECHO MEAS - SI(LVOT): 38.1 ML/M^2
BH CV ECHO MEAS - SI(MOD-SP2): 29 ML/M^2
BH CV ECHO MEAS - SI(MOD-SP4): 32.9 ML/M^2
BH CV ECHO MEAS - SI(TEICH): 27.2 ML/M^2
BH CV ECHO MEAS - SV(AO): 156.6 ML
BH CV ECHO MEAS - SV(CUBED): 60.8 ML
BH CV ECHO MEAS - SV(LVOT): 77.6 ML
BH CV ECHO MEAS - SV(MOD-SP2): 59 ML
BH CV ECHO MEAS - SV(MOD-SP4): 67 ML
BH CV ECHO MEAS - SV(RVOT): 116.3 ML
BH CV ECHO MEAS - SV(TEICH): 55.5 ML
BH CV ECHO MEAS - TAPSE (>1.6): 2.5 CM2
BH CV ECHO MEAS - TR MAX VEL: 333 CM/SEC
BH CV VAS BP RIGHT ARM: NORMAL MMHG
BH CV XLRA - RV BASE: 3.4 CM
BH CV XLRA - TDI S': 13 CM/SEC
BILIRUB SERPL-MCNC: 0.4 MG/DL (ref 0.1–1.2)
BILIRUB SERPL-MCNC: 0.9 MG/DL (ref 0.1–1.2)
BILIRUB UR QL STRIP: NEGATIVE
BUN BLD-MCNC: 13 MG/DL (ref 8–23)
BUN BLD-MCNC: 14 MG/DL (ref 8–23)
BUN BLD-MCNC: 16 MG/DL (ref 8–23)
BUN BLD-MCNC: 17 MG/DL (ref 8–23)
BUN BLD-MCNC: 18 MG/DL (ref 8–23)
BUN BLD-MCNC: 19 MG/DL (ref 8–23)
BUN SERPL-MCNC: 14 MG/DL (ref 8–23)
BUN/CREAT SERPL: 15.7 (ref 7–25)
BUN/CREAT SERPL: 22.5 (ref 7–25)
BUN/CREAT SERPL: 23 (ref 7–25)
BUN/CREAT SERPL: 23.6 (ref 7–25)
BUN/CREAT SERPL: 24.1 (ref 7–25)
BUN/CREAT SERPL: 25.8 (ref 7–25)
BUN/CREAT SERPL: 25.9 (ref 7–25)
BUN/CREAT SERPL: 27.1 (ref 7–25)
BUN/CREAT SERPL: 33.3 (ref 7–25)
CALCIUM SERPL-MCNC: 9.4 MG/DL (ref 8.6–10.5)
CALCIUM SPEC-SCNC: 10.1 MG/DL (ref 8.6–10.5)
CALCIUM SPEC-SCNC: 7.7 MG/DL (ref 8.6–10.5)
CALCIUM SPEC-SCNC: 8.4 MG/DL (ref 8.6–10.5)
CALCIUM SPEC-SCNC: 8.5 MG/DL (ref 8.6–10.5)
CALCIUM SPEC-SCNC: 8.7 MG/DL (ref 8.6–10.5)
CALCIUM SPEC-SCNC: 8.9 MG/DL (ref 8.6–10.5)
CALCIUM SPEC-SCNC: 8.9 MG/DL (ref 8.6–10.5)
CALCIUM SPEC-SCNC: 9.1 MG/DL (ref 8.6–10.5)
CHLORIDE SERPL-SCNC: 88 MMOL/L (ref 98–107)
CHLORIDE SERPL-SCNC: 91 MMOL/L (ref 98–107)
CHLORIDE SERPL-SCNC: 91 MMOL/L (ref 98–107)
CHLORIDE SERPL-SCNC: 92 MMOL/L (ref 98–107)
CHLORIDE SERPL-SCNC: 96 MMOL/L (ref 98–107)
CHLORIDE SERPL-SCNC: 99 MMOL/L (ref 98–107)
CK SERPL-CCNC: 182 U/L (ref 20–200)
CK SERPL-CCNC: 321 U/L (ref 20–200)
CK SERPL-CCNC: 895 U/L (ref 20–200)
CLARITY UR: ABNORMAL
CO2 SERPL-SCNC: 21.1 MMOL/L (ref 22–29)
CO2 SERPL-SCNC: 24.8 MMOL/L (ref 22–29)
CO2 SERPL-SCNC: 25.2 MMOL/L (ref 22–29)
CO2 SERPL-SCNC: 25.4 MMOL/L (ref 22–29)
CO2 SERPL-SCNC: 25.6 MMOL/L (ref 22–29)
CO2 SERPL-SCNC: 25.8 MMOL/L (ref 22–29)
CO2 SERPL-SCNC: 27 MMOL/L (ref 22–29)
CO2 SERPL-SCNC: 27.6 MMOL/L (ref 22–29)
CO2 SERPL-SCNC: 28 MMOL/L (ref 22–29)
COLOR UR: YELLOW
CORTIS SERPL-MCNC: 17.22 MCG/DL
CREAT BLD-MCNC: 0.54 MG/DL (ref 0.76–1.27)
CREAT BLD-MCNC: 0.54 MG/DL (ref 0.76–1.27)
CREAT BLD-MCNC: 0.55 MG/DL (ref 0.76–1.27)
CREAT BLD-MCNC: 0.58 MG/DL (ref 0.76–1.27)
CREAT BLD-MCNC: 0.61 MG/DL (ref 0.76–1.27)
CREAT BLD-MCNC: 0.66 MG/DL (ref 0.76–1.27)
CREAT BLD-MCNC: 0.68 MG/DL (ref 0.76–1.27)
CREAT BLD-MCNC: 0.7 MG/DL (ref 0.76–1.27)
CREAT BLD-MCNC: 0.71 MG/DL (ref 0.76–1.27)
CREAT SERPL-MCNC: 0.89 MG/DL (ref 0.76–1.27)
CREAT UR-MCNC: 77.4 MG/DL
CRP SERPL-MCNC: 5.38 MG/DL (ref 0–0.5)
DEPRECATED RDW RBC AUTO: 39.9 FL (ref 37–54)
DEPRECATED RDW RBC AUTO: 40.7 FL (ref 37–54)
DEPRECATED RDW RBC AUTO: 40.9 FL (ref 37–54)
DEPRECATED RDW RBC AUTO: 41.1 FL (ref 37–54)
DEPRECATED RDW RBC AUTO: 41.4 FL (ref 37–54)
DEPRECATED RDW RBC AUTO: 41.5 FL (ref 37–54)
DEPRECATED RDW RBC AUTO: 41.8 FL (ref 37–54)
E/E' RATIO: 12
EOSINOPHIL # BLD AUTO: 0.01 10*3/MM3 (ref 0–0.7)
EOSINOPHIL # BLD AUTO: 0.03 10*3/MM3 (ref 0–0.7)
EOSINOPHIL # BLD AUTO: 0.06 10*3/MM3 (ref 0–0.7)
EOSINOPHIL # BLD AUTO: 0.23 10*3/MM3 (ref 0–0.7)
EOSINOPHIL # BLD AUTO: 0.35 10*3/MM3 (ref 0–0.7)
EOSINOPHIL NFR BLD AUTO: 0.1 % (ref 0.3–6.2)
EOSINOPHIL NFR BLD AUTO: 0.3 % (ref 0.3–6.2)
EOSINOPHIL NFR BLD AUTO: 0.3 % (ref 0.3–6.2)
EOSINOPHIL NFR BLD AUTO: 0.4 % (ref 0.3–6.2)
EOSINOPHIL NFR BLD AUTO: 0.7 % (ref 0.3–6.2)
EOSINOPHIL NFR BLD AUTO: 1.9 % (ref 0.3–6.2)
EOSINOPHIL NFR BLD AUTO: 4.8 % (ref 0.3–6.2)
ERYTHROCYTE [DISTWIDTH] IN BLOOD BY AUTOMATED COUNT: 12.4 % (ref 11.5–14.5)
ERYTHROCYTE [DISTWIDTH] IN BLOOD BY AUTOMATED COUNT: 12.5 % (ref 11.5–14.5)
ERYTHROCYTE [DISTWIDTH] IN BLOOD BY AUTOMATED COUNT: 12.6 % (ref 11.5–14.5)
ERYTHROCYTE [DISTWIDTH] IN BLOOD BY AUTOMATED COUNT: 12.7 % (ref 11.5–14.5)
ERYTHROCYTE [DISTWIDTH] IN BLOOD BY AUTOMATED COUNT: 12.9 % (ref 11.5–14.5)
ERYTHROCYTE [DISTWIDTH] IN BLOOD BY AUTOMATED COUNT: 13.7 % (ref 11.5–14.5)
GFR SERPL CREATININE-BSD FRML MDRD: 105 ML/MIN/1.73
GFR SERPL CREATININE-BSD FRML MDRD: 106 ML/MIN/1.73
GFR SERPL CREATININE-BSD FRML MDRD: 110 ML/MIN/1.73
GFR SERPL CREATININE-BSD FRML MDRD: 114 ML/MIN/1.73
GFR SERPL CREATININE-BSD FRML MDRD: 125 ML/MIN/1.73
GFR SERPL CREATININE-BSD FRML MDRD: 132 ML/MIN/1.73
GFR SERPL CREATININE-BSD FRML MDRD: 141 ML/MIN/1.73
GFR SERPL CREATININE-BSD FRML MDRD: 144 ML/MIN/1.73
GFR SERPL CREATININE-BSD FRML MDRD: 144 ML/MIN/1.73
GLOBULIN SER CALC-MCNC: 2.9 GM/DL
GLOBULIN UR ELPH-MCNC: 4 GM/DL
GLUCOSE BLD-MCNC: 104 MG/DL (ref 65–99)
GLUCOSE BLD-MCNC: 106 MG/DL (ref 65–99)
GLUCOSE BLD-MCNC: 114 MG/DL (ref 65–99)
GLUCOSE BLD-MCNC: 116 MG/DL (ref 65–99)
GLUCOSE BLD-MCNC: 118 MG/DL (ref 65–99)
GLUCOSE BLD-MCNC: 120 MG/DL (ref 65–99)
GLUCOSE BLD-MCNC: 89 MG/DL (ref 65–99)
GLUCOSE BLD-MCNC: 94 MG/DL (ref 65–99)
GLUCOSE BLDC GLUCOMTR-MCNC: 115 MG/DL (ref 70–130)
GLUCOSE SERPL-MCNC: 117 MG/DL (ref 65–99)
GLUCOSE UR STRIP-MCNC: NEGATIVE MG/DL
HCT VFR BLD AUTO: 31.8 % (ref 40.4–52.2)
HCT VFR BLD AUTO: 32.7 % (ref 40.4–52.2)
HCT VFR BLD AUTO: 33.1 % (ref 40.4–52.2)
HCT VFR BLD AUTO: 34.8 % (ref 40.4–52.2)
HCT VFR BLD AUTO: 35.3 % (ref 40.4–52.2)
HCT VFR BLD AUTO: 37.2 % (ref 40.4–52.2)
HCT VFR BLD AUTO: 39.9 % (ref 40.4–52.2)
HCT VFR BLD AUTO: 40.9 % (ref 40.4–52.2)
HGB BLD-MCNC: 10.6 G/DL (ref 13.7–17.6)
HGB BLD-MCNC: 10.8 G/DL (ref 13.7–17.6)
HGB BLD-MCNC: 11.2 G/DL (ref 13.7–17.6)
HGB BLD-MCNC: 11.8 G/DL (ref 13.7–17.6)
HGB BLD-MCNC: 11.8 G/DL (ref 13.7–17.6)
HGB BLD-MCNC: 12.2 G/DL (ref 13.7–17.6)
HGB BLD-MCNC: 12.8 G/DL (ref 13.7–17.6)
HGB BLD-MCNC: 13.9 G/DL (ref 13.7–17.6)
HGB UR QL STRIP.AUTO: ABNORMAL
HOLD SPECIMEN: NORMAL
HOLD SPECIMEN: NORMAL
HYALINE CASTS UR QL AUTO: ABNORMAL /LPF
IMM GRANULOCYTES # BLD: 0.03 10*3/MM3 (ref 0–0.03)
IMM GRANULOCYTES # BLD: 0.04 10*3/MM3 (ref 0–0.03)
IMM GRANULOCYTES # BLD: 0.09 10*3/MM3 (ref 0–0.03)
IMM GRANULOCYTES NFR BLD: 0.3 % (ref 0–0.5)
IMM GRANULOCYTES NFR BLD: 0.3 % (ref 0–0.5)
IMM GRANULOCYTES NFR BLD: 0.4 % (ref 0–0.5)
IMM GRANULOCYTES NFR BLD: 0.5 % (ref 0–0.5)
IMM GRANULOCYTES NFR BLD: 0.8 % (ref 0–0.5)
KETONES UR QL STRIP: ABNORMAL
LEFT ATRIUM VOLUME INDEX: 37 ML/M2
LEUKOCYTE ESTERASE UR QL STRIP.AUTO: ABNORMAL
LV EF 2D ECHO EST: 65 %
LYMPHOCYTES # BLD AUTO: 0.72 10*3/MM3 (ref 0.9–4.8)
LYMPHOCYTES # BLD AUTO: 1.01 10*3/MM3 (ref 0.9–4.8)
LYMPHOCYTES # BLD AUTO: 1.03 10*3/MM3 (ref 0.9–4.8)
LYMPHOCYTES # BLD AUTO: 1.04 10*3/MM3 (ref 0.9–4.8)
LYMPHOCYTES # BLD AUTO: 1.06 10*3/MM3 (ref 0.9–4.8)
LYMPHOCYTES # BLD AUTO: 1.07 10*3/MM3 (ref 0.9–4.8)
LYMPHOCYTES # BLD AUTO: 1.14 10*3/MM3 (ref 0.9–4.8)
LYMPHOCYTES NFR BLD AUTO: 12.5 % (ref 19.6–45.3)
LYMPHOCYTES NFR BLD AUTO: 14.8 % (ref 19.6–45.3)
LYMPHOCYTES NFR BLD AUTO: 7.8 % (ref 19.6–45.3)
LYMPHOCYTES NFR BLD AUTO: 8.6 % (ref 19.6–45.3)
LYMPHOCYTES NFR BLD AUTO: 9 % (ref 19.6–45.3)
LYMPHOCYTES NFR BLD AUTO: 9.5 % (ref 19.6–45.3)
LYMPHOCYTES NFR BLD AUTO: 9.6 % (ref 19.6–45.3)
MAGNESIUM SERPL-MCNC: 1.8 MG/DL (ref 1.6–2.4)
MAGNESIUM SERPL-MCNC: 1.8 MG/DL (ref 1.6–2.4)
MAGNESIUM SERPL-MCNC: 1.9 MG/DL (ref 1.6–2.4)
MAXIMAL PREDICTED HEART RATE: 132 BPM
MCH RBC QN AUTO: 29.4 PG (ref 27–32.7)
MCH RBC QN AUTO: 29.8 PG (ref 27–32.7)
MCH RBC QN AUTO: 30 PG (ref 27–32.7)
MCH RBC QN AUTO: 30.2 PG (ref 27–32.7)
MCH RBC QN AUTO: 30.3 PG (ref 27–32.7)
MCH RBC QN AUTO: 30.4 PG (ref 27–32.7)
MCH RBC QN AUTO: 30.6 PG (ref 27–32.7)
MCH RBC QN AUTO: 31.1 PG (ref 27–32.7)
MCHC RBC AUTO-ENTMCNC: 32.1 G/DL (ref 32.6–36.4)
MCHC RBC AUTO-ENTMCNC: 32.8 G/DL (ref 32.6–36.4)
MCHC RBC AUTO-ENTMCNC: 33 G/DL (ref 32.6–36.4)
MCHC RBC AUTO-ENTMCNC: 33.3 G/DL (ref 32.6–36.4)
MCHC RBC AUTO-ENTMCNC: 33.4 G/DL (ref 32.6–36.4)
MCHC RBC AUTO-ENTMCNC: 33.8 G/DL (ref 32.6–36.4)
MCHC RBC AUTO-ENTMCNC: 33.9 G/DL (ref 32.6–36.4)
MCHC RBC AUTO-ENTMCNC: 34 G/DL (ref 32.6–36.4)
MCV RBC AUTO: 89 FL (ref 79.8–96.2)
MCV RBC AUTO: 89.6 FL (ref 79.8–96.2)
MCV RBC AUTO: 89.7 FL (ref 79.8–96.2)
MCV RBC AUTO: 90.1 FL (ref 79.8–96.2)
MCV RBC AUTO: 90.1 FL (ref 79.8–96.2)
MCV RBC AUTO: 90.3 FL (ref 79.8–96.2)
MCV RBC AUTO: 90.5 FL (ref 79.8–96.2)
MCV RBC AUTO: 96.8 FL (ref 79.8–96.2)
MONOCYTES # BLD AUTO: 0.78 10*3/MM3 (ref 0.2–1.2)
MONOCYTES # BLD AUTO: 0.96 10*3/MM3 (ref 0.2–1.2)
MONOCYTES # BLD AUTO: 1.48 10*3/MM3 (ref 0.2–1.2)
MONOCYTES # BLD AUTO: 1.69 10*3/MM3 (ref 0.2–1.2)
MONOCYTES # BLD AUTO: 1.99 10*3/MM3 (ref 0.2–1.2)
MONOCYTES # BLD AUTO: 2.04 10*3/MM3 (ref 0.2–1.2)
MONOCYTES # BLD AUTO: 2.4 10*3/MM3 (ref 0.2–1.2)
MONOCYTES NFR BLD AUTO: 13.3 % (ref 5–12)
MONOCYTES NFR BLD AUTO: 17 % (ref 5–12)
MONOCYTES NFR BLD AUTO: 17.7 % (ref 5–12)
MONOCYTES NFR BLD AUTO: 17.7 % (ref 5–12)
MONOCYTES NFR BLD AUTO: 20 % (ref 5–12)
MONOCYTES NFR BLD AUTO: 22.3 % (ref 5–12)
MONOCYTES NFR BLD AUTO: 5.8 % (ref 5–12)
NEUTROPHILS # BLD AUTO: 11.46 10*3/MM3 (ref 1.9–8.1)
NEUTROPHILS # BLD AUTO: 4.81 10*3/MM3 (ref 1.9–8.1)
NEUTROPHILS # BLD AUTO: 5.6 10*3/MM3 (ref 1.9–8.1)
NEUTROPHILS # BLD AUTO: 6.11 10*3/MM3 (ref 1.9–8.1)
NEUTROPHILS # BLD AUTO: 7.26 10*3/MM3 (ref 1.9–8.1)
NEUTROPHILS # BLD AUTO: 8.17 10*3/MM3 (ref 1.9–8.1)
NEUTROPHILS # BLD AUTO: 8.45 10*3/MM3 (ref 1.9–8.1)
NEUTROPHILS NFR BLD AUTO: 66.1 % (ref 42.7–76)
NEUTROPHILS NFR BLD AUTO: 66.4 % (ref 42.7–76)
NEUTROPHILS NFR BLD AUTO: 67.4 % (ref 42.7–76)
NEUTROPHILS NFR BLD AUTO: 70.6 % (ref 42.7–76)
NEUTROPHILS NFR BLD AUTO: 72.5 % (ref 42.7–76)
NEUTROPHILS NFR BLD AUTO: 72.8 % (ref 42.7–76)
NEUTROPHILS NFR BLD AUTO: 85.9 % (ref 42.7–76)
NITRITE UR QL STRIP: POSITIVE
NT-PROBNP SERPL-MCNC: 489.1 PG/ML (ref 0–1800)
OSMOLALITY UR: 729 MOSM/KG
PH UR STRIP.AUTO: 6.5 [PH] (ref 5–8)
PHOSPHATE SERPL-MCNC: 2.7 MG/DL (ref 2.5–4.5)
PHOSPHATE SERPL-MCNC: 3.2 MG/DL (ref 2.5–4.5)
PLAT MORPH BLD: NORMAL
PLATELET # BLD AUTO: 249 10*3/MM3 (ref 140–500)
PLATELET # BLD AUTO: 281 10*3/MM3 (ref 140–500)
PLATELET # BLD AUTO: 321 10*3/MM3 (ref 140–500)
PLATELET # BLD AUTO: 322 10*3/MM3 (ref 140–500)
PLATELET # BLD AUTO: 332 10*3/MM3 (ref 140–500)
PLATELET # BLD AUTO: 333 10*3/MM3 (ref 140–500)
PLATELET # BLD AUTO: 342 10*3/MM3 (ref 140–500)
PLATELET # BLD AUTO: 387 10*3/MM3 (ref 140–500)
PMV BLD AUTO: 10 FL (ref 6–12)
PMV BLD AUTO: 10.1 FL (ref 6–12)
PMV BLD AUTO: 10.2 FL (ref 6–12)
PMV BLD AUTO: 10.2 FL (ref 6–12)
PMV BLD AUTO: 9.9 FL (ref 6–12)
POTASSIUM BLD-SCNC: 3.3 MMOL/L (ref 3.5–5.2)
POTASSIUM BLD-SCNC: 3.4 MMOL/L (ref 3.5–5.2)
POTASSIUM BLD-SCNC: 3.5 MMOL/L (ref 3.5–5.2)
POTASSIUM BLD-SCNC: 3.8 MMOL/L (ref 3.5–5.2)
POTASSIUM BLD-SCNC: 4 MMOL/L (ref 3.5–5.2)
POTASSIUM BLD-SCNC: 4.5 MMOL/L (ref 3.5–5.2)
POTASSIUM BLD-SCNC: 4.6 MMOL/L (ref 3.5–5.2)
POTASSIUM SERPL-SCNC: 4.4 MMOL/L (ref 3.5–5.2)
PROCALCITONIN SERPL-MCNC: 0.05 NG/ML (ref 0.1–0.25)
PROT SERPL-MCNC: 6.9 G/DL (ref 6–8.5)
PROT SERPL-MCNC: 7.8 G/DL (ref 6–8.5)
PROT UR QL STRIP: ABNORMAL
RBC # BLD AUTO: 3.53 10*6/MM3 (ref 4.6–6)
RBC # BLD AUTO: 3.62 10*6/MM3 (ref 4.6–6)
RBC # BLD AUTO: 3.69 10*6/MM3 (ref 4.6–6)
RBC # BLD AUTO: 3.9 10*6/MM3 (ref 4.6–6)
RBC # BLD AUTO: 3.91 10*6/MM3 (ref 4.6–6)
RBC # BLD AUTO: 4.12 10*6/MM3 (ref 4.6–6)
RBC # BLD AUTO: 4.15 10*6/MM3 (ref 4.6–6)
RBC # BLD AUTO: 4.54 10*6/MM3 (ref 4.6–6)
RBC # UR: ABNORMAL /HPF
RBC MORPH BLD: NORMAL
REF LAB TEST METHOD: ABNORMAL
SODIUM BLD-SCNC: 124 MMOL/L (ref 136–145)
SODIUM BLD-SCNC: 124 MMOL/L (ref 136–145)
SODIUM BLD-SCNC: 125 MMOL/L (ref 136–145)
SODIUM BLD-SCNC: 126 MMOL/L (ref 136–145)
SODIUM BLD-SCNC: 127 MMOL/L (ref 136–145)
SODIUM BLD-SCNC: 127 MMOL/L (ref 136–145)
SODIUM BLD-SCNC: 128 MMOL/L (ref 136–145)
SODIUM BLD-SCNC: 130 MMOL/L (ref 136–145)
SODIUM BLD-SCNC: 132 MMOL/L (ref 136–145)
SODIUM SERPL-SCNC: 136 MMOL/L (ref 136–145)
SODIUM UR-SCNC: 174 MMOL/L
SP GR UR STRIP: 1.01 (ref 1–1.03)
SQUAMOUS #/AREA URNS HPF: ABNORMAL /HPF
STRESS TARGET HR: 112 BPM
TROPONIN T SERPL-MCNC: <0.01 NG/ML (ref 0–0.03)
TSH SERPL DL<=0.05 MIU/L-ACNC: 3.26 MIU/ML (ref 0.27–4.2)
URATE SERPL-MCNC: 2.3 MG/DL (ref 3.4–7)
UROBILINOGEN UR QL STRIP: ABNORMAL
VANCOMYCIN TROUGH SERPL-MCNC: 12.7 MCG/ML (ref 5–20)
VANCOMYCIN TROUGH SERPL-MCNC: 17.4 MCG/ML (ref 5–20)
VANCOMYCIN TROUGH SERPL-MCNC: 18 MCG/ML (ref 5–20)
WBC # BLD AUTO: 7.24 10*3/MM3 (ref 4.5–10.7)
WBC MORPH BLD: NORMAL
WBC NRBC COR # BLD: 10.76 10*3/MM3 (ref 4.5–10.7)
WBC NRBC COR # BLD: 10.78 10*3/MM3 (ref 4.5–10.7)
WBC NRBC COR # BLD: 11.25 10*3/MM3 (ref 4.5–10.7)
WBC NRBC COR # BLD: 11.97 10*3/MM3 (ref 4.5–10.7)
WBC NRBC COR # BLD: 13.34 10*3/MM3 (ref 4.5–10.7)
WBC NRBC COR # BLD: 8.38 10*3/MM3 (ref 4.5–10.7)
WBC NRBC COR # BLD: 8.47 10*3/MM3 (ref 4.5–10.7)
WBC UR QL AUTO: ABNORMAL /HPF
WHOLE BLOOD HOLD SPECIMEN: NORMAL
WHOLE BLOOD HOLD SPECIMEN: NORMAL

## 2018-01-01 PROCEDURE — 25010000002 PIPERACILLIN SOD-TAZOBACTAM PER 1 G: Performed by: INTERNAL MEDICINE

## 2018-01-01 PROCEDURE — 97110 THERAPEUTIC EXERCISES: CPT

## 2018-01-01 PROCEDURE — 70553 MRI BRAIN STEM W/O & W/DYE: CPT

## 2018-01-01 PROCEDURE — 85025 COMPLETE CBC W/AUTO DIFF WBC: CPT

## 2018-01-01 PROCEDURE — 84484 ASSAY OF TROPONIN QUANT: CPT

## 2018-01-01 PROCEDURE — 84295 ASSAY OF SERUM SODIUM: CPT | Performed by: INTERNAL MEDICINE

## 2018-01-01 PROCEDURE — 72125 CT NECK SPINE W/O DYE: CPT

## 2018-01-01 PROCEDURE — 93306 TTE W/DOPPLER COMPLETE: CPT

## 2018-01-01 PROCEDURE — 87186 SC STD MICRODIL/AGAR DIL: CPT

## 2018-01-01 PROCEDURE — 70450 CT HEAD/BRAIN W/O DYE: CPT

## 2018-01-01 PROCEDURE — 87086 URINE CULTURE/COLONY COUNT: CPT

## 2018-01-01 PROCEDURE — 87040 BLOOD CULTURE FOR BACTERIA: CPT | Performed by: INTERNAL MEDICINE

## 2018-01-01 PROCEDURE — 92610 EVALUATE SWALLOWING FUNCTION: CPT

## 2018-01-01 PROCEDURE — 99285 EMERGENCY DEPT VISIT HI MDM: CPT

## 2018-01-01 PROCEDURE — 25010000002 CEFEPIME PER 500 MG: Performed by: INTERNAL MEDICINE

## 2018-01-01 PROCEDURE — 93005 ELECTROCARDIOGRAM TRACING: CPT

## 2018-01-01 PROCEDURE — 25010000002 PERFLUTREN (DEFINITY) 8.476 MG IN SODIUM CHLORIDE 0.9 % 10 ML INJECTION: Performed by: INTERNAL MEDICINE

## 2018-01-01 PROCEDURE — 25010000002 ENOXAPARIN PER 10 MG: Performed by: INTERNAL MEDICINE

## 2018-01-01 PROCEDURE — 82565 ASSAY OF CREATININE: CPT | Performed by: INTERNAL MEDICINE

## 2018-01-01 PROCEDURE — 97162 PT EVAL MOD COMPLEX 30 MIN: CPT

## 2018-01-01 PROCEDURE — 71045 X-RAY EXAM CHEST 1 VIEW: CPT

## 2018-01-01 PROCEDURE — 25010000002 CEFTRIAXONE PER 250 MG: Performed by: INTERNAL MEDICINE

## 2018-01-01 PROCEDURE — 25010000002 VANCOMYCIN 10 G RECONSTITUTED SOLUTION: Performed by: INTERNAL MEDICINE

## 2018-01-01 PROCEDURE — 80202 ASSAY OF VANCOMYCIN: CPT | Performed by: HOSPITALIST

## 2018-01-01 PROCEDURE — 99283 EMERGENCY DEPT VISIT LOW MDM: CPT

## 2018-01-01 PROCEDURE — 85025 COMPLETE CBC W/AUTO DIFF WBC: CPT | Performed by: INTERNAL MEDICINE

## 2018-01-01 PROCEDURE — A9577 INJ MULTIHANCE: HCPCS | Performed by: INTERNAL MEDICINE

## 2018-01-01 PROCEDURE — 25010000002 CEFEPIME: Performed by: INTERNAL MEDICINE

## 2018-01-01 PROCEDURE — 25010000003 LEVETIRACETAM IN NACL 0.75% 1000 MG/100ML SOLUTION: Performed by: INTERNAL MEDICINE

## 2018-01-01 PROCEDURE — 99232 SBSQ HOSP IP/OBS MODERATE 35: CPT | Performed by: INTERNAL MEDICINE

## 2018-01-01 PROCEDURE — 85027 COMPLETE CBC AUTOMATED: CPT | Performed by: INTERNAL MEDICINE

## 2018-01-01 PROCEDURE — 82550 ASSAY OF CK (CPK): CPT | Performed by: FAMILY MEDICINE

## 2018-01-01 PROCEDURE — 80048 BASIC METABOLIC PNL TOTAL CA: CPT | Performed by: INTERNAL MEDICINE

## 2018-01-01 PROCEDURE — 83880 ASSAY OF NATRIURETIC PEPTIDE: CPT | Performed by: FAMILY MEDICINE

## 2018-01-01 PROCEDURE — 99214 OFFICE O/P EST MOD 30 MIN: CPT | Performed by: INTERNAL MEDICINE

## 2018-01-01 PROCEDURE — 93010 ELECTROCARDIOGRAM REPORT: CPT | Performed by: INTERNAL MEDICINE

## 2018-01-01 PROCEDURE — 80069 RENAL FUNCTION PANEL: CPT | Performed by: INTERNAL MEDICINE

## 2018-01-01 PROCEDURE — 93306 TTE W/DOPPLER COMPLETE: CPT | Performed by: INTERNAL MEDICINE

## 2018-01-01 PROCEDURE — 97116 GAIT TRAINING THERAPY: CPT | Performed by: PHYSICAL THERAPIST

## 2018-01-01 PROCEDURE — 97110 THERAPEUTIC EXERCISES: CPT | Performed by: PHYSICAL THERAPIST

## 2018-01-01 PROCEDURE — 82550 ASSAY OF CK (CPK): CPT | Performed by: INTERNAL MEDICINE

## 2018-01-01 PROCEDURE — 73070 X-RAY EXAM OF ELBOW: CPT

## 2018-01-01 PROCEDURE — 99222 1ST HOSP IP/OBS MODERATE 55: CPT | Performed by: INTERNAL MEDICINE

## 2018-01-01 PROCEDURE — 84300 ASSAY OF URINE SODIUM: CPT | Performed by: INTERNAL MEDICINE

## 2018-01-01 PROCEDURE — 71101 X-RAY EXAM UNILAT RIBS/CHEST: CPT

## 2018-01-01 PROCEDURE — 84132 ASSAY OF SERUM POTASSIUM: CPT | Performed by: INTERNAL MEDICINE

## 2018-01-01 PROCEDURE — 80202 ASSAY OF VANCOMYCIN: CPT | Performed by: INTERNAL MEDICINE

## 2018-01-01 PROCEDURE — 85007 BL SMEAR W/DIFF WBC COUNT: CPT | Performed by: INTERNAL MEDICINE

## 2018-01-01 PROCEDURE — 82533 TOTAL CORTISOL: CPT | Performed by: INTERNAL MEDICINE

## 2018-01-01 PROCEDURE — 81001 URINALYSIS AUTO W/SCOPE: CPT

## 2018-01-01 PROCEDURE — 84550 ASSAY OF BLOOD/URIC ACID: CPT | Performed by: INTERNAL MEDICINE

## 2018-01-01 PROCEDURE — 83735 ASSAY OF MAGNESIUM: CPT | Performed by: INTERNAL MEDICINE

## 2018-01-01 PROCEDURE — 99213 OFFICE O/P EST LOW 20 MIN: CPT | Performed by: FAMILY MEDICINE

## 2018-01-01 PROCEDURE — 99223 1ST HOSP IP/OBS HIGH 75: CPT | Performed by: NURSE PRACTITIONER

## 2018-01-01 PROCEDURE — 83735 ASSAY OF MAGNESIUM: CPT

## 2018-01-01 PROCEDURE — 82962 GLUCOSE BLOOD TEST: CPT

## 2018-01-01 PROCEDURE — 93000 ELECTROCARDIOGRAM COMPLETE: CPT | Performed by: INTERNAL MEDICINE

## 2018-01-01 PROCEDURE — 84145 PROCALCITONIN (PCT): CPT | Performed by: FAMILY MEDICINE

## 2018-01-01 PROCEDURE — 25010000002 VANCOMYCIN: Performed by: INTERNAL MEDICINE

## 2018-01-01 PROCEDURE — 0 GADOBENATE DIMEGLUMINE 529 MG/ML SOLUTION: Performed by: INTERNAL MEDICINE

## 2018-01-01 PROCEDURE — 73560 X-RAY EXAM OF KNEE 1 OR 2: CPT

## 2018-01-01 PROCEDURE — 84443 ASSAY THYROID STIM HORMONE: CPT | Performed by: INTERNAL MEDICINE

## 2018-01-01 PROCEDURE — 86140 C-REACTIVE PROTEIN: CPT | Performed by: INTERNAL MEDICINE

## 2018-01-01 PROCEDURE — 99214 OFFICE O/P EST MOD 30 MIN: CPT | Performed by: RADIOLOGY

## 2018-01-01 PROCEDURE — 83935 ASSAY OF URINE OSMOLALITY: CPT | Performed by: INTERNAL MEDICINE

## 2018-01-01 PROCEDURE — 70486 CT MAXILLOFACIAL W/O DYE: CPT

## 2018-01-01 PROCEDURE — 82570 ASSAY OF URINE CREATININE: CPT | Performed by: INTERNAL MEDICINE

## 2018-01-01 PROCEDURE — 99223 1ST HOSP IP/OBS HIGH 75: CPT | Performed by: INTERNAL MEDICINE

## 2018-01-01 PROCEDURE — 80053 COMPREHEN METABOLIC PANEL: CPT

## 2018-01-01 RX ORDER — POTASSIUM CHLORIDE 1.5 G/1.77G
40 POWDER, FOR SOLUTION ORAL AS NEEDED
Status: DISCONTINUED | OUTPATIENT
Start: 2018-01-01 | End: 2018-01-01 | Stop reason: HOSPADM

## 2018-01-01 RX ORDER — FAMOTIDINE 20 MG/1
20 TABLET, FILM COATED ORAL DAILY
Status: DISCONTINUED | OUTPATIENT
Start: 2018-01-01 | End: 2018-01-01 | Stop reason: HOSPADM

## 2018-01-01 RX ORDER — SODIUM CHLORIDE 0.9 % (FLUSH) 0.9 %
10 SYRINGE (ML) INJECTION AS NEEDED
Status: DISCONTINUED | OUTPATIENT
Start: 2018-01-01 | End: 2018-01-01 | Stop reason: HOSPADM

## 2018-01-01 RX ORDER — LEVETIRACETAM 500 MG/1
1000 TABLET ORAL EVERY 12 HOURS SCHEDULED
Status: DISCONTINUED | OUTPATIENT
Start: 2018-01-01 | End: 2018-01-01 | Stop reason: HOSPADM

## 2018-01-01 RX ORDER — ONDANSETRON 4 MG/1
4 TABLET, ORALLY DISINTEGRATING ORAL EVERY 6 HOURS PRN
Status: DISCONTINUED | OUTPATIENT
Start: 2018-01-01 | End: 2018-01-01 | Stop reason: HOSPADM

## 2018-01-01 RX ORDER — TEMAZEPAM 15 MG/1
15 CAPSULE ORAL NIGHTLY PRN
COMMUNITY

## 2018-01-01 RX ORDER — ONDANSETRON 4 MG/1
4 TABLET, FILM COATED ORAL EVERY 6 HOURS PRN
Status: DISCONTINUED | OUTPATIENT
Start: 2018-01-01 | End: 2018-01-01 | Stop reason: HOSPADM

## 2018-01-01 RX ORDER — HYDRALAZINE HYDROCHLORIDE 25 MG/1
25 TABLET, FILM COATED ORAL ONCE
Status: COMPLETED | OUTPATIENT
Start: 2018-01-01 | End: 2018-01-01

## 2018-01-01 RX ORDER — LEVETIRACETAM 10 MG/ML
1000 INJECTION INTRAVASCULAR EVERY 12 HOURS SCHEDULED
Status: DISCONTINUED | OUTPATIENT
Start: 2018-01-01 | End: 2018-01-01

## 2018-01-01 RX ORDER — ATORVASTATIN CALCIUM 10 MG/1
10 TABLET, FILM COATED ORAL DAILY
Status: DISCONTINUED | OUTPATIENT
Start: 2018-01-01 | End: 2018-01-01

## 2018-01-01 RX ORDER — BISACODYL 5 MG/1
5 TABLET, DELAYED RELEASE ORAL DAILY PRN
Status: DISCONTINUED | OUTPATIENT
Start: 2018-01-01 | End: 2018-01-01 | Stop reason: HOSPADM

## 2018-01-01 RX ORDER — METOPROLOL SUCCINATE 25 MG/1
25 TABLET, EXTENDED RELEASE ORAL
Status: DISCONTINUED | OUTPATIENT
Start: 2018-01-01 | End: 2018-01-01

## 2018-01-01 RX ORDER — SODIUM CHLORIDE 9 MG/ML
125 INJECTION, SOLUTION INTRAVENOUS CONTINUOUS
Status: DISCONTINUED | OUTPATIENT
Start: 2018-01-01 | End: 2018-01-01

## 2018-01-01 RX ORDER — HYDRALAZINE HYDROCHLORIDE 25 MG/1
25 TABLET, FILM COATED ORAL EVERY 8 HOURS PRN
Status: DISCONTINUED | OUTPATIENT
Start: 2018-01-01 | End: 2018-01-01 | Stop reason: HOSPADM

## 2018-01-01 RX ORDER — METOPROLOL SUCCINATE 25 MG/1
37.5 TABLET, EXTENDED RELEASE ORAL
Status: DISCONTINUED | OUTPATIENT
Start: 2018-01-01 | End: 2018-01-01

## 2018-01-01 RX ORDER — RAMIPRIL 10 MG/1
CAPSULE ORAL
Qty: 180 CAPSULE | Refills: 0 | Status: SHIPPED | OUTPATIENT
Start: 2018-01-01 | End: 2018-01-01 | Stop reason: ALTCHOICE

## 2018-01-01 RX ORDER — VALSARTAN 320 MG/1
320 TABLET ORAL DAILY
Status: DISCONTINUED | OUTPATIENT
Start: 2018-01-01 | End: 2018-01-01 | Stop reason: HOSPADM

## 2018-01-01 RX ORDER — HYDROCODONE BITARTRATE AND ACETAMINOPHEN 7.5; 325 MG/1; MG/1
1 TABLET ORAL EVERY 8 HOURS PRN
Qty: 90 TABLET | Refills: 0 | Status: ON HOLD | OUTPATIENT
Start: 2018-01-01 | End: 2018-01-01

## 2018-01-01 RX ORDER — ASPIRIN 81 MG/1
81 TABLET ORAL DAILY
Qty: 30 TABLET | Refills: 1 | Status: SHIPPED | OUTPATIENT
Start: 2018-01-01

## 2018-01-01 RX ORDER — RAMIPRIL 10 MG/1
10 CAPSULE ORAL EVERY 12 HOURS SCHEDULED
Status: DISCONTINUED | OUTPATIENT
Start: 2018-01-01 | End: 2018-01-01

## 2018-01-01 RX ORDER — LEVETIRACETAM 1000 MG/1
1000 TABLET ORAL EVERY 12 HOURS SCHEDULED
Qty: 60 TABLET | Refills: 1 | Status: SHIPPED | OUTPATIENT
Start: 2018-01-01

## 2018-01-01 RX ORDER — AMOXICILLIN AND CLAVULANATE POTASSIUM 875; 125 MG/1; MG/1
1 TABLET, FILM COATED ORAL EVERY 12 HOURS SCHEDULED
Qty: 34 TABLET | Refills: 0
Start: 2018-01-01 | End: 2018-01-01

## 2018-01-01 RX ORDER — HALOPERIDOL 1 MG/1
1 TABLET ORAL EVERY 6 HOURS PRN
Status: DISCONTINUED | OUTPATIENT
Start: 2018-01-01 | End: 2018-01-01

## 2018-01-01 RX ORDER — METOPROLOL SUCCINATE 50 MG/1
50 TABLET, EXTENDED RELEASE ORAL
Status: DISCONTINUED | OUTPATIENT
Start: 2018-01-01 | End: 2018-01-01 | Stop reason: HOSPADM

## 2018-01-01 RX ORDER — ALENDRONATE SODIUM 70 MG/1
TABLET ORAL
Qty: 12 TABLET | Refills: 3 | Status: SHIPPED | OUTPATIENT
Start: 2018-01-01

## 2018-01-01 RX ORDER — HYDROCODONE BITARTRATE AND ACETAMINOPHEN 7.5; 325 MG/1; MG/1
1 TABLET ORAL EVERY 6 HOURS PRN
Qty: 10 TABLET | Refills: 0 | Status: SHIPPED | OUTPATIENT
Start: 2018-01-01

## 2018-01-01 RX ORDER — MINERAL OIL AND WHITE PETROLATUM 150; 830 MG/G; MG/G
OINTMENT OPHTHALMIC NIGHTLY
Status: DISCONTINUED | OUTPATIENT
Start: 2018-01-01 | End: 2018-01-01 | Stop reason: HOSPADM

## 2018-01-01 RX ORDER — ONDANSETRON 2 MG/ML
4 INJECTION INTRAMUSCULAR; INTRAVENOUS EVERY 6 HOURS PRN
Status: DISCONTINUED | OUTPATIENT
Start: 2018-01-01 | End: 2018-01-01 | Stop reason: HOSPADM

## 2018-01-01 RX ORDER — METOPROLOL SUCCINATE 50 MG/1
50 TABLET, EXTENDED RELEASE ORAL
Qty: 30 TABLET | Refills: 0 | Status: SHIPPED | OUTPATIENT
Start: 2018-01-01

## 2018-01-01 RX ORDER — VALSARTAN 160 MG/1
160 TABLET ORAL DAILY
Status: DISCONTINUED | OUTPATIENT
Start: 2018-01-01 | End: 2018-01-01

## 2018-01-01 RX ORDER — HYDROCODONE BITARTRATE AND ACETAMINOPHEN 7.5; 325 MG/1; MG/1
1 TABLET ORAL EVERY 8 HOURS PRN
Status: DISCONTINUED | OUTPATIENT
Start: 2018-01-01 | End: 2018-01-01 | Stop reason: HOSPADM

## 2018-01-01 RX ORDER — SODIUM CHLORIDE 9 MG/ML
75 INJECTION, SOLUTION INTRAVENOUS CONTINUOUS
Status: DISCONTINUED | OUTPATIENT
Start: 2018-01-01 | End: 2018-01-01

## 2018-01-01 RX ORDER — VANCOMYCIN HYDROCHLORIDE 1 G/200ML
1000 INJECTION, SOLUTION INTRAVENOUS EVERY 12 HOURS
Status: DISCONTINUED | OUTPATIENT
Start: 2018-01-01 | End: 2018-01-01 | Stop reason: DRUGHIGH

## 2018-01-01 RX ORDER — VALSARTAN 320 MG/1
320 TABLET ORAL DAILY
Qty: 90 TABLET | Refills: 0 | Status: SHIPPED | OUTPATIENT
Start: 2018-01-01

## 2018-01-01 RX ORDER — HYDROCODONE BITARTRATE AND ACETAMINOPHEN 7.5; 325 MG/1; MG/1
1 TABLET ORAL EVERY 8 HOURS PRN
Qty: 20 TABLET | Refills: 0 | Status: SHIPPED | OUTPATIENT
Start: 2018-01-01

## 2018-01-01 RX ORDER — AMLODIPINE BESYLATE 2.5 MG/1
2.5 TABLET ORAL 4 TIMES DAILY
COMMUNITY

## 2018-01-01 RX ORDER — ACETAMINOPHEN 325 MG/1
650 TABLET ORAL EVERY 4 HOURS PRN
Status: DISCONTINUED | OUTPATIENT
Start: 2018-01-01 | End: 2018-01-01 | Stop reason: HOSPADM

## 2018-01-01 RX ORDER — ASPIRIN 81 MG/1
81 TABLET ORAL DAILY
Status: DISCONTINUED | OUTPATIENT
Start: 2018-01-01 | End: 2018-01-01 | Stop reason: HOSPADM

## 2018-01-01 RX ORDER — ALUMINA, MAGNESIA, AND SIMETHICONE 2400; 2400; 240 MG/30ML; MG/30ML; MG/30ML
15 SUSPENSION ORAL EVERY 6 HOURS PRN
Status: DISCONTINUED | OUTPATIENT
Start: 2018-01-01 | End: 2018-01-01 | Stop reason: HOSPADM

## 2018-01-01 RX ORDER — VALSARTAN 160 MG/1
160 TABLET ORAL ONCE
Status: COMPLETED | OUTPATIENT
Start: 2018-01-01 | End: 2018-01-01

## 2018-01-01 RX ORDER — AMOXICILLIN AND CLAVULANATE POTASSIUM 875; 125 MG/1; MG/1
1 TABLET, FILM COATED ORAL EVERY 12 HOURS SCHEDULED
COMMUNITY

## 2018-01-01 RX ORDER — HALOPERIDOL 1 MG/1
1 TABLET ORAL ONCE
Status: COMPLETED | OUTPATIENT
Start: 2018-01-01 | End: 2018-01-01

## 2018-01-01 RX ORDER — CEFTRIAXONE SODIUM 1 G/50ML
1 INJECTION, SOLUTION INTRAVENOUS EVERY 24 HOURS
Status: COMPLETED | OUTPATIENT
Start: 2018-01-01 | End: 2018-01-01

## 2018-01-01 RX ORDER — POTASSIUM CHLORIDE 750 MG/1
40 CAPSULE, EXTENDED RELEASE ORAL AS NEEDED
Status: DISCONTINUED | OUTPATIENT
Start: 2018-01-01 | End: 2018-01-01 | Stop reason: HOSPADM

## 2018-01-01 RX ADMIN — ACETAMINOPHEN 650 MG: 325 TABLET, FILM COATED ORAL at 17:37

## 2018-01-01 RX ADMIN — VANCOMYCIN HYDROCHLORIDE 1250 MG: 10 INJECTION, POWDER, LYOPHILIZED, FOR SOLUTION INTRAVENOUS at 23:06

## 2018-01-01 RX ADMIN — HYDROCODONE BITARTRATE AND ACETAMINOPHEN 1 TABLET: 7.5; 325 TABLET ORAL at 23:11

## 2018-01-01 RX ADMIN — VALSARTAN 320 MG: 320 TABLET, FILM COATED ORAL at 09:21

## 2018-01-01 RX ADMIN — ATORVASTATIN CALCIUM 10 MG: 10 TABLET, FILM COATED ORAL at 08:26

## 2018-01-01 RX ADMIN — MINERAL OIL AND WHITE PETROLATUM: 150; 830 OINTMENT OPHTHALMIC at 20:49

## 2018-01-01 RX ADMIN — TAZOBACTAM SODIUM AND PIPERACILLIN SODIUM 3.38 G: 375; 3 INJECTION, SOLUTION INTRAVENOUS at 21:24

## 2018-01-01 RX ADMIN — MINERAL OIL AND WHITE PETROLATUM: 150; 830 OINTMENT OPHTHALMIC at 20:11

## 2018-01-01 RX ADMIN — LEVETIRACETAM 1000 MG: 500 TABLET, FILM COATED ORAL at 21:45

## 2018-01-01 RX ADMIN — VANCOMYCIN HYDROCHLORIDE 1250 MG: 10 INJECTION, POWDER, LYOPHILIZED, FOR SOLUTION INTRAVENOUS at 12:34

## 2018-01-01 RX ADMIN — MINERAL OIL AND WHITE PETROLATUM: 150; 830 OINTMENT OPHTHALMIC at 21:24

## 2018-01-01 RX ADMIN — VALSARTAN 320 MG: 320 TABLET, FILM COATED ORAL at 07:32

## 2018-01-01 RX ADMIN — CEFEPIME HYDROCHLORIDE 2 G: 2 INJECTION, POWDER, FOR SOLUTION INTRAVENOUS at 11:31

## 2018-01-01 RX ADMIN — MINERAL OIL AND WHITE PETROLATUM: 150; 830 OINTMENT OPHTHALMIC at 20:24

## 2018-01-01 RX ADMIN — ENOXAPARIN SODIUM 40 MG: 40 INJECTION SUBCUTANEOUS at 09:13

## 2018-01-01 RX ADMIN — LEVETIRACETAM 1000 MG: 500 TABLET, FILM COATED ORAL at 09:40

## 2018-01-01 RX ADMIN — ASPIRIN 81 MG: 81 TABLET ORAL at 09:02

## 2018-01-01 RX ADMIN — VANCOMYCIN HYDROCHLORIDE 1250 MG: 10 INJECTION, POWDER, LYOPHILIZED, FOR SOLUTION INTRAVENOUS at 20:45

## 2018-01-01 RX ADMIN — HYDRALAZINE HYDROCHLORIDE 25 MG: 25 TABLET, FILM COATED ORAL at 15:02

## 2018-01-01 RX ADMIN — VANCOMYCIN HYDROCHLORIDE 1250 MG: 10 INJECTION, POWDER, LYOPHILIZED, FOR SOLUTION INTRAVENOUS at 21:24

## 2018-01-01 RX ADMIN — CEFEPIME HYDROCHLORIDE 2 G: 2 INJECTION, POWDER, FOR SOLUTION INTRAVENOUS at 11:46

## 2018-01-01 RX ADMIN — FAMOTIDINE 20 MG: 20 TABLET, FILM COATED ORAL at 08:24

## 2018-01-01 RX ADMIN — CEFEPIME HYDROCHLORIDE 2 G: 2 INJECTION, POWDER, FOR SOLUTION INTRAVENOUS at 21:43

## 2018-01-01 RX ADMIN — PERFLUTREN 2 ML: 6.52 INJECTION, SUSPENSION INTRAVENOUS at 10:45

## 2018-01-01 RX ADMIN — METOPROLOL SUCCINATE 50 MG: 50 TABLET, FILM COATED, EXTENDED RELEASE ORAL at 11:06

## 2018-01-01 RX ADMIN — HYDROCODONE BITARTRATE AND ACETAMINOPHEN 1 TABLET: 7.5; 325 TABLET ORAL at 09:33

## 2018-01-01 RX ADMIN — VALSARTAN 320 MG: 320 TABLET, FILM COATED ORAL at 09:02

## 2018-01-01 RX ADMIN — ASPIRIN 81 MG: 81 TABLET ORAL at 11:06

## 2018-01-01 RX ADMIN — METOPROLOL SUCCINATE 25 MG: 25 TABLET, FILM COATED, EXTENDED RELEASE ORAL at 08:16

## 2018-01-01 RX ADMIN — VANCOMYCIN HYDROCHLORIDE 1250 MG: 10 INJECTION, POWDER, LYOPHILIZED, FOR SOLUTION INTRAVENOUS at 09:14

## 2018-01-01 RX ADMIN — HYDRALAZINE HYDROCHLORIDE 25 MG: 25 TABLET, FILM COATED ORAL at 20:10

## 2018-01-01 RX ADMIN — VALSARTAN 320 MG: 320 TABLET, FILM COATED ORAL at 11:06

## 2018-01-01 RX ADMIN — VALSARTAN 320 MG: 320 TABLET, FILM COATED ORAL at 08:02

## 2018-01-01 RX ADMIN — ENOXAPARIN SODIUM 40 MG: 40 INJECTION SUBCUTANEOUS at 08:32

## 2018-01-01 RX ADMIN — MINERAL OIL AND WHITE PETROLATUM: 150; 830 OINTMENT OPHTHALMIC at 20:10

## 2018-01-01 RX ADMIN — ATORVASTATIN CALCIUM 10 MG: 10 TABLET, FILM COATED ORAL at 08:16

## 2018-01-01 RX ADMIN — CEFEPIME HYDROCHLORIDE 2 G: 2 INJECTION, POWDER, FOR SOLUTION INTRAVENOUS at 23:07

## 2018-01-01 RX ADMIN — LEVETIRACETAM 1000 MG: 10 INJECTION INTRAVENOUS at 20:12

## 2018-01-01 RX ADMIN — ENOXAPARIN SODIUM 40 MG: 40 INJECTION SUBCUTANEOUS at 08:19

## 2018-01-01 RX ADMIN — ASPIRIN 81 MG: 81 TABLET ORAL at 10:48

## 2018-01-01 RX ADMIN — HYDROCODONE BITARTRATE AND ACETAMINOPHEN 1 TABLET: 7.5; 325 TABLET ORAL at 12:11

## 2018-01-01 RX ADMIN — HYDROCODONE BITARTRATE AND ACETAMINOPHEN 1 TABLET: 7.5; 325 TABLET ORAL at 09:39

## 2018-01-01 RX ADMIN — CEFTRIAXONE SODIUM 1 G: 1 INJECTION, SOLUTION INTRAVENOUS at 22:55

## 2018-01-01 RX ADMIN — HYDROCODONE BITARTRATE AND ACETAMINOPHEN 1 TABLET: 7.5; 325 TABLET ORAL at 21:43

## 2018-01-01 RX ADMIN — FAMOTIDINE 20 MG: 20 TABLET, FILM COATED ORAL at 11:06

## 2018-01-01 RX ADMIN — SODIUM CHLORIDE 125 ML/HR: 9 INJECTION, SOLUTION INTRAVENOUS at 19:18

## 2018-01-01 RX ADMIN — VANCOMYCIN HYDROCHLORIDE 1250 MG: 10 INJECTION, POWDER, LYOPHILIZED, FOR SOLUTION INTRAVENOUS at 20:35

## 2018-01-01 RX ADMIN — VANCOMYCIN HYDROCHLORIDE 1250 MG: 10 INJECTION, POWDER, LYOPHILIZED, FOR SOLUTION INTRAVENOUS at 21:09

## 2018-01-01 RX ADMIN — CEFEPIME HYDROCHLORIDE 2 G: 2 INJECTION, POWDER, FOR SOLUTION INTRAVENOUS at 21:44

## 2018-01-01 RX ADMIN — ASPIRIN 81 MG: 81 TABLET ORAL at 20:42

## 2018-01-01 RX ADMIN — FAMOTIDINE 20 MG: 20 TABLET, FILM COATED ORAL at 08:19

## 2018-01-01 RX ADMIN — FAMOTIDINE 20 MG: 20 TABLET, FILM COATED ORAL at 08:16

## 2018-01-01 RX ADMIN — FAMOTIDINE 20 MG: 20 TABLET, FILM COATED ORAL at 09:40

## 2018-01-01 RX ADMIN — ACETAMINOPHEN 650 MG: 325 TABLET, FILM COATED ORAL at 07:46

## 2018-01-01 RX ADMIN — CEFEPIME HYDROCHLORIDE 2 G: 2 INJECTION, POWDER, FOR SOLUTION INTRAVENOUS at 12:08

## 2018-01-01 RX ADMIN — ENOXAPARIN SODIUM 40 MG: 40 INJECTION SUBCUTANEOUS at 08:31

## 2018-01-01 RX ADMIN — CEFEPIME HYDROCHLORIDE 2 G: 2 INJECTION, POWDER, FOR SOLUTION INTRAVENOUS at 22:35

## 2018-01-01 RX ADMIN — HYDROCODONE BITARTRATE AND ACETAMINOPHEN 1 TABLET: 7.5; 325 TABLET ORAL at 06:14

## 2018-01-01 RX ADMIN — METOPROLOL SUCCINATE 50 MG: 50 TABLET, FILM COATED, EXTENDED RELEASE ORAL at 09:21

## 2018-01-01 RX ADMIN — LEVETIRACETAM 1000 MG: 500 TABLET, FILM COATED ORAL at 09:21

## 2018-01-01 RX ADMIN — METOPROLOL SUCCINATE 50 MG: 50 TABLET, FILM COATED, EXTENDED RELEASE ORAL at 08:02

## 2018-01-01 RX ADMIN — ASPIRIN 81 MG: 81 TABLET ORAL at 09:40

## 2018-01-01 RX ADMIN — VALSARTAN 320 MG: 320 TABLET, FILM COATED ORAL at 08:24

## 2018-01-01 RX ADMIN — GADOBENATE DIMEGLUMINE 17 ML: 529 INJECTION, SOLUTION INTRAVENOUS at 18:22

## 2018-01-01 RX ADMIN — LEVETIRACETAM 1000 MG: 500 TABLET, FILM COATED ORAL at 21:24

## 2018-01-01 RX ADMIN — HYDROCODONE BITARTRATE AND ACETAMINOPHEN 1 TABLET: 7.5; 325 TABLET ORAL at 18:35

## 2018-01-01 RX ADMIN — METOPROLOL SUCCINATE 50 MG: 50 TABLET, FILM COATED, EXTENDED RELEASE ORAL at 09:02

## 2018-01-01 RX ADMIN — CEFEPIME HYDROCHLORIDE 2 G: 2 INJECTION, POWDER, FOR SOLUTION INTRAVENOUS at 22:04

## 2018-01-01 RX ADMIN — VANCOMYCIN HYDROCHLORIDE 1250 MG: 10 INJECTION, POWDER, LYOPHILIZED, FOR SOLUTION INTRAVENOUS at 09:47

## 2018-01-01 RX ADMIN — ASPIRIN 81 MG: 81 TABLET ORAL at 09:22

## 2018-01-01 RX ADMIN — ENOXAPARIN SODIUM 80 MG: 80 INJECTION SUBCUTANEOUS at 05:21

## 2018-01-01 RX ADMIN — CEFTRIAXONE SODIUM 1 G: 1 INJECTION, SOLUTION INTRAVENOUS at 23:31

## 2018-01-01 RX ADMIN — ENOXAPARIN SODIUM 40 MG: 40 INJECTION SUBCUTANEOUS at 07:42

## 2018-01-01 RX ADMIN — FAMOTIDINE 20 MG: 20 TABLET, FILM COATED ORAL at 10:48

## 2018-01-01 RX ADMIN — VALSARTAN 320 MG: 320 TABLET, FILM COATED ORAL at 10:47

## 2018-01-01 RX ADMIN — METOPROLOL SUCCINATE 25 MG: 25 TABLET, FILM COATED, EXTENDED RELEASE ORAL at 07:32

## 2018-01-01 RX ADMIN — LEVETIRACETAM 1000 MG: 10 INJECTION INTRAVENOUS at 08:00

## 2018-01-01 RX ADMIN — HYDROCODONE BITARTRATE AND ACETAMINOPHEN 1 TABLET: 7.5; 325 TABLET ORAL at 10:53

## 2018-01-01 RX ADMIN — LEVETIRACETAM 1000 MG: 500 TABLET, FILM COATED ORAL at 21:44

## 2018-01-01 RX ADMIN — VANCOMYCIN HYDROCHLORIDE 1250 MG: 10 INJECTION, POWDER, LYOPHILIZED, FOR SOLUTION INTRAVENOUS at 08:32

## 2018-01-01 RX ADMIN — LEVETIRACETAM 1000 MG: 500 TABLET, FILM COATED ORAL at 20:49

## 2018-01-01 RX ADMIN — VALSARTAN 160 MG: 160 TABLET ORAL at 11:38

## 2018-01-01 RX ADMIN — FAMOTIDINE 20 MG: 20 TABLET, FILM COATED ORAL at 11:37

## 2018-01-01 RX ADMIN — LEVETIRACETAM 1000 MG: 500 TABLET, FILM COATED ORAL at 20:34

## 2018-01-01 RX ADMIN — LEVETIRACETAM 1000 MG: 10 INJECTION INTRAVENOUS at 08:21

## 2018-01-01 RX ADMIN — CEFTRIAXONE SODIUM 1 G: 1 INJECTION, SOLUTION INTRAVENOUS at 23:23

## 2018-01-01 RX ADMIN — MINERAL OIL AND WHITE PETROLATUM: 150; 830 OINTMENT OPHTHALMIC at 20:34

## 2018-01-01 RX ADMIN — METOPROLOL SUCCINATE 25 MG: 25 TABLET, FILM COATED, EXTENDED RELEASE ORAL at 20:42

## 2018-01-01 RX ADMIN — CEFEPIME HYDROCHLORIDE 2 G: 2 INJECTION, POWDER, FOR SOLUTION INTRAVENOUS at 22:55

## 2018-01-01 RX ADMIN — FAMOTIDINE 20 MG: 20 TABLET, FILM COATED ORAL at 09:21

## 2018-01-01 RX ADMIN — CEFEPIME HYDROCHLORIDE 2 G: 2 INJECTION, POWDER, FOR SOLUTION INTRAVENOUS at 10:53

## 2018-01-01 RX ADMIN — VANCOMYCIN HYDROCHLORIDE 1250 MG: 10 INJECTION, POWDER, LYOPHILIZED, FOR SOLUTION INTRAVENOUS at 20:12

## 2018-01-01 RX ADMIN — CEFEPIME HYDROCHLORIDE 2 G: 2 INJECTION, POWDER, FOR SOLUTION INTRAVENOUS at 10:37

## 2018-01-01 RX ADMIN — HYDRALAZINE HYDROCHLORIDE 25 MG: 25 TABLET, FILM COATED ORAL at 06:40

## 2018-01-01 RX ADMIN — HYDROCODONE BITARTRATE AND ACETAMINOPHEN 1 TABLET: 7.5; 325 TABLET ORAL at 10:46

## 2018-01-01 RX ADMIN — SODIUM CHLORIDE 250 ML: 9 INJECTION, SOLUTION INTRAVENOUS at 14:33

## 2018-01-01 RX ADMIN — HYDROCODONE BITARTRATE AND ACETAMINOPHEN 1 TABLET: 7.5; 325 TABLET ORAL at 20:42

## 2018-01-01 RX ADMIN — VALSARTAN 320 MG: 320 TABLET, FILM COATED ORAL at 08:16

## 2018-01-01 RX ADMIN — LEVETIRACETAM 1000 MG: 10 INJECTION INTRAVENOUS at 08:02

## 2018-01-01 RX ADMIN — LEVETIRACETAM 1000 MG: 500 TABLET, FILM COATED ORAL at 11:06

## 2018-01-01 RX ADMIN — ATORVASTATIN CALCIUM 10 MG: 10 TABLET, FILM COATED ORAL at 07:51

## 2018-01-01 RX ADMIN — VALSARTAN 320 MG: 320 TABLET, FILM COATED ORAL at 08:19

## 2018-01-01 RX ADMIN — HALOPERIDOL 1 MG: 1 TABLET ORAL at 23:05

## 2018-01-01 RX ADMIN — LEVETIRACETAM 1000 MG: 10 INJECTION INTRAVENOUS at 08:26

## 2018-01-01 RX ADMIN — HYDROCODONE BITARTRATE AND ACETAMINOPHEN 1 TABLET: 7.5; 325 TABLET ORAL at 12:34

## 2018-01-01 RX ADMIN — METOPROLOL SUCCINATE 25 MG: 25 TABLET, FILM COATED, EXTENDED RELEASE ORAL at 08:26

## 2018-01-01 RX ADMIN — VANCOMYCIN HYDROCHLORIDE 1250 MG: 10 INJECTION, POWDER, LYOPHILIZED, FOR SOLUTION INTRAVENOUS at 08:42

## 2018-01-01 RX ADMIN — ASPIRIN 81 MG: 81 TABLET ORAL at 08:02

## 2018-01-01 RX ADMIN — TAZOBACTAM SODIUM AND PIPERACILLIN SODIUM 3.38 G: 375; 3 INJECTION, SOLUTION INTRAVENOUS at 02:13

## 2018-01-01 RX ADMIN — CEFEPIME HYDROCHLORIDE 2 G: 2 INJECTION, POWDER, FOR SOLUTION INTRAVENOUS at 22:37

## 2018-01-01 RX ADMIN — VANCOMYCIN HYDROCHLORIDE 1250 MG: 10 INJECTION, POWDER, LYOPHILIZED, FOR SOLUTION INTRAVENOUS at 08:56

## 2018-01-01 RX ADMIN — LEVETIRACETAM 1000 MG: 10 INJECTION INTRAVENOUS at 20:10

## 2018-01-01 RX ADMIN — ATORVASTATIN CALCIUM 10 MG: 10 TABLET, FILM COATED ORAL at 08:19

## 2018-01-01 RX ADMIN — HYDROCODONE BITARTRATE AND ACETAMINOPHEN 1 TABLET: 7.5; 325 TABLET ORAL at 23:02

## 2018-01-01 RX ADMIN — FAMOTIDINE 20 MG: 20 TABLET, FILM COATED ORAL at 07:47

## 2018-01-01 RX ADMIN — ASPIRIN 81 MG: 81 TABLET ORAL at 08:26

## 2018-01-01 RX ADMIN — RAMIPRIL 10 MG: 10 CAPSULE ORAL at 11:37

## 2018-01-01 RX ADMIN — VALSARTAN 160 MG: 160 TABLET ORAL at 14:20

## 2018-01-01 RX ADMIN — ENOXAPARIN SODIUM 80 MG: 80 INJECTION SUBCUTANEOUS at 19:20

## 2018-01-01 RX ADMIN — HYDROCODONE BITARTRATE AND ACETAMINOPHEN 1 TABLET: 7.5; 325 TABLET ORAL at 11:46

## 2018-01-01 RX ADMIN — SODIUM CHLORIDE 75 ML/HR: 9 INJECTION, SOLUTION INTRAVENOUS at 17:52

## 2018-01-01 RX ADMIN — SODIUM CHLORIDE 125 ML/HR: 9 INJECTION, SOLUTION INTRAVENOUS at 15:05

## 2018-01-01 RX ADMIN — VANCOMYCIN HYDROCHLORIDE 1250 MG: 10 INJECTION, POWDER, LYOPHILIZED, FOR SOLUTION INTRAVENOUS at 20:49

## 2018-01-01 RX ADMIN — LEVETIRACETAM 1000 MG: 500 TABLET, FILM COATED ORAL at 09:02

## 2018-01-01 RX ADMIN — CEFEPIME HYDROCHLORIDE 2 G: 2 INJECTION, POWDER, FOR SOLUTION INTRAVENOUS at 10:46

## 2018-01-01 RX ADMIN — HYDROCODONE BITARTRATE AND ACETAMINOPHEN 1 TABLET: 7.5; 325 TABLET ORAL at 22:35

## 2018-01-01 RX ADMIN — LEVETIRACETAM 1000 MG: 500 TABLET, FILM COATED ORAL at 10:48

## 2018-01-01 RX ADMIN — VANCOMYCIN HYDROCHLORIDE 1250 MG: 10 INJECTION, POWDER, LYOPHILIZED, FOR SOLUTION INTRAVENOUS at 21:43

## 2018-01-01 RX ADMIN — VANCOMYCIN HYDROCHLORIDE 1250 MG: 10 INJECTION, POWDER, LYOPHILIZED, FOR SOLUTION INTRAVENOUS at 09:35

## 2018-01-01 RX ADMIN — HYDROCODONE BITARTRATE AND ACETAMINOPHEN 1 TABLET: 7.5; 325 TABLET ORAL at 05:05

## 2018-01-01 RX ADMIN — POTASSIUM CHLORIDE 40 MEQ: 750 CAPSULE, EXTENDED RELEASE ORAL at 22:53

## 2018-01-01 RX ADMIN — ATORVASTATIN CALCIUM 10 MG: 10 TABLET, FILM COATED ORAL at 11:37

## 2018-01-01 RX ADMIN — ASPIRIN 81 MG: 81 TABLET ORAL at 08:19

## 2018-01-01 RX ADMIN — VALSARTAN 320 MG: 320 TABLET, FILM COATED ORAL at 09:40

## 2018-01-01 RX ADMIN — ASPIRIN 81 MG: 81 TABLET ORAL at 07:51

## 2018-01-01 RX ADMIN — VANCOMYCIN HYDROCHLORIDE 1250 MG: 10 INJECTION, POWDER, LYOPHILIZED, FOR SOLUTION INTRAVENOUS at 11:50

## 2018-01-01 RX ADMIN — HYDRALAZINE HYDROCHLORIDE 25 MG: 25 TABLET, FILM COATED ORAL at 13:52

## 2018-01-01 RX ADMIN — METOPROLOL SUCCINATE 37.5 MG: 25 TABLET, FILM COATED, EXTENDED RELEASE ORAL at 08:24

## 2018-01-01 RX ADMIN — VANCOMYCIN HYDROCHLORIDE 1250 MG: 10 INJECTION, POWDER, LYOPHILIZED, FOR SOLUTION INTRAVENOUS at 08:55

## 2018-01-01 RX ADMIN — METOPROLOL SUCCINATE 50 MG: 50 TABLET, FILM COATED, EXTENDED RELEASE ORAL at 10:48

## 2018-01-01 RX ADMIN — ACETAMINOPHEN 650 MG: 325 TABLET, FILM COATED ORAL at 19:49

## 2018-01-01 RX ADMIN — METOPROLOL SUCCINATE 50 MG: 50 TABLET, FILM COATED, EXTENDED RELEASE ORAL at 09:40

## 2018-01-01 RX ADMIN — ENOXAPARIN SODIUM 40 MG: 40 INJECTION SUBCUTANEOUS at 08:16

## 2018-01-01 RX ADMIN — VANCOMYCIN HYDROCHLORIDE 500 MG: 500 INJECTION, POWDER, LYOPHILIZED, FOR SOLUTION INTRAVENOUS at 13:10

## 2018-01-01 RX ADMIN — FAMOTIDINE 20 MG: 20 TABLET, FILM COATED ORAL at 09:02

## 2018-01-01 RX ADMIN — CEFEPIME HYDROCHLORIDE 2 G: 2 INJECTION, POWDER, FOR SOLUTION INTRAVENOUS at 10:20

## 2018-01-01 RX ADMIN — ASPIRIN 81 MG: 81 TABLET ORAL at 08:16

## 2018-01-01 RX ADMIN — FAMOTIDINE 20 MG: 20 TABLET, FILM COATED ORAL at 08:26

## 2018-01-01 RX ADMIN — HYDROCODONE BITARTRATE AND ACETAMINOPHEN 1 TABLET: 7.5; 325 TABLET ORAL at 20:40

## 2018-01-01 RX ADMIN — CEFEPIME HYDROCHLORIDE 2 G: 2 INJECTION, POWDER, FOR SOLUTION INTRAVENOUS at 10:10

## 2018-01-01 RX ADMIN — CEFEPIME HYDROCHLORIDE 2 G: 2 INJECTION, POWDER, FOR SOLUTION INTRAVENOUS at 22:31

## 2018-01-01 RX ADMIN — HYDRALAZINE HYDROCHLORIDE 25 MG: 25 TABLET, FILM COATED ORAL at 05:32

## 2018-01-01 RX ADMIN — LEVETIRACETAM 1000 MG: 10 INJECTION INTRAVENOUS at 20:41

## 2018-01-01 RX ADMIN — VANCOMYCIN HYDROCHLORIDE 1250 MG: 10 INJECTION, POWDER, LYOPHILIZED, FOR SOLUTION INTRAVENOUS at 21:55

## 2018-01-01 RX ADMIN — CEFEPIME HYDROCHLORIDE 2 G: 2 INJECTION, POWDER, FOR SOLUTION INTRAVENOUS at 10:54

## 2018-01-01 RX ADMIN — METOPROLOL SUCCINATE 25 MG: 25 TABLET, FILM COATED, EXTENDED RELEASE ORAL at 08:19

## 2018-01-01 RX ADMIN — VALSARTAN 320 MG: 320 TABLET, FILM COATED ORAL at 08:26

## 2018-01-01 RX ADMIN — LEVETIRACETAM 1000 MG: 10 INJECTION INTRAVENOUS at 20:22

## 2018-01-01 RX ADMIN — FAMOTIDINE 20 MG: 20 TABLET, FILM COATED ORAL at 08:03

## 2018-01-01 RX ADMIN — ASPIRIN 81 MG: 81 TABLET ORAL at 08:32

## 2018-01-02 NOTE — PROGRESS NOTES
HPI  Krzysztof Ward is a 88 y.o. male who is here for follow up of chronic back pain as well as hypertension hyperlipidemia and other issues.  Continues follow-up with cancer doctors including recent ear surgery.  Also will be following up with dermatologist and ENT specialist.  Patient had a good Manuela except for over eating.  He continues to live alone but seems to be functioning very well eating 2 meals a day.  Continues with chronic back pain but seems to be under adequate control with current medication including 2-3 hydrocodone daily as needed.       Review of Systems   HENT: Positive for hearing loss.    Genitourinary: Positive for frequency.   Musculoskeletal: Positive for arthralgias and back pain.   Skin:        History of skin cancers         Past Medical History:   Diagnosis Date   • Cancer    • H/O degenerative disc disease    • Hypertension    • Osteoporosis    • Skin cancer        Past Surgical History:   Procedure Laterality Date   • BACK SURGERY     • EXTERNAL EAR SURGERY     • REPLACEMENT TOTAL KNEE         No family history on file.    Social History     Social History   • Marital status:      Spouse name: N/A   • Number of children: N/A   • Years of education: N/A     Occupational History   • Not on file.     Social History Main Topics   • Smoking status: Former Smoker   • Smokeless tobacco: Former User      Comment: quit 1962   • Alcohol use Yes      Comment: a beer now and then   • Drug use: Yes     Special: Hydrocodone   • Sexual activity: No     Other Topics Concern   • Not on file     Social History Narrative         Physical Exam   Constitutional: He is oriented to person, place, and time. He appears well-developed and well-nourished. No distress.   HENT:   Head: Normocephalic.   Ears:    Eyes: Conjunctivae are normal. Pupils are equal, round, and reactive to light.   Cardiovascular: Normal rate.    Pulmonary/Chest: Effort normal. No respiratory distress.   Abdominal: Soft.    Musculoskeletal: He exhibits no edema or deformity.   Neurological: He is alert and oriented to person, place, and time.   Skin: Skin is warm and dry.   Psychiatric: He has a normal mood and affect. His behavior is normal. Judgment and thought content normal.   Nursing note and vitals reviewed.        Assessment/Plan    Krzysztof was seen today for follow-up.    Diagnoses and all orders for this visit:    Hyperlipidemia, unspecified hyperlipidemia type    Essential hypertension    High risk medication use  -     CBC & Differential  -     Comprehensive Metabolic Panel    Chronic low back pain, unspecified back pain laterality, with sciatica presence unspecified    Elevated PSA    Age-related osteoporosis without current pathological fracture    Other orders  -     HYDROcodone-acetaminophen (NORCO) 7.5-325 MG per tablet; Take 1 tablet by mouth Every 8 (Eight) Hours As Needed for Moderate Pain .      Patient is here for routine follow-up especially of chronic back pain which seems to be under adequate control with current medications.  PSA level slightly elevated in the past but has been stable and especially in view of his age do not feel needs to be followed at this time.  Otherwise continue current therapy including follow-up every 3 months because of controlled pain medication.    This note includes information entered using a voice recognition dictation system.  Though reviewed, some nonsensible errors may remain.

## 2018-01-04 NOTE — PROGRESS NOTES
"  Subjective     No ref. provider found     Diagnosis Plan   1. 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 He returns today for follow up now 8 weeks out from completion of his radiation therapy for T2N0 squamous cell carcinoma of the left ear canal.  He completed a dose of 66Gy to the left ear canal on 10/12/17.  He has been doing well since I last saw him.  He states the drainage from the left ear has stopped and his skin is healing nicely.  He denies any tinnitus but does report decreased hearing but this was present prior to radiaton .  He saw dr. aguilar his ENT last month and was told it \"looks good\".  He denies any pain in the left ear.               Review of Systems   Constitutional: Negative.    HENT: Positive for hearing loss. Negative for ear discharge, sinus pressure and tinnitus.    Skin: Negative.          Past Medical History:   Diagnosis Date   • Cancer    • H/O degenerative disc disease    • Hypertension    • Osteoporosis    • Skin cancer          Past Surgical History:   Procedure Laterality Date   • BACK SURGERY     • EXTERNAL EAR SURGERY     • REPLACEMENT TOTAL KNEE           Social History     Social History   • Marital status:      Spouse name: N/A   • Number of children: N/A   • Years of education: N/A     Social History Main Topics   • Smoking status: Former Smoker   • Smokeless tobacco: Former User      Comment: quit 1962   • Alcohol use Yes      Comment: a beer now and then   • Drug use: Yes     Special: Hydrocodone   • Sexual activity: No     Other Topics Concern   • Not on file     Social History Narrative         No family history on file.       Objective    Physical Exam   Constitutional: He appears well-developed and well-nourished.   HENT:   Ears:          Current Outpatient " Prescriptions on File Prior to Visit   Medication Sig Dispense Refill   • acetaminophen (TYLENOL) 325 MG tablet Take by mouth.     • alendronate (FOSAMAX) 70 MG tablet Take 1 tablet by mouth Every 7 (Seven) Days. 12 tablet 3   • baclofen (LIORESAL) 10 MG tablet Take 1 tablet by mouth 3 (Three) Times a Day As Needed for Muscle Spasms. 15 tablet 0   • celecoxib (CeleBREX) 200 MG capsule Take 1 capsule by mouth Daily. 90 capsule 3   • desonide (DESOWEN) 0.05 % cream Apply topically.     • famotidine (PEPCID) 20 MG tablet Take by mouth daily.     • Glucosamine HCl 500 MG tablet Take by mouth.     • HYDROcodone-acetaminophen (NORCO) 7.5-325 MG per tablet Take 1 tablet by mouth Every 8 (Eight) Hours As Needed for Moderate Pain . 90 tablet 0   • Multiple Vitamin (MULTI-VITAMIN) tablet Take by mouth.     • mupirocin (BACTROBAN) 2 % ointment Apply sparingly to the biopsy area 2 times a day  3   • ramipril (ALTACE) 10 MG capsule TAKE 2 CAPSULES DAILY 180 capsule 0   • senna-docusate (PERICOLACE) 8.6-50 MG per tablet Take by mouth.     • simvastatin (ZOCOR) 20 MG tablet Take 1 tablet by mouth Every Night. 90 tablet 3   • valsartan (DIOVAN) 160 MG tablet Take 1 tablet by mouth Daily. 90 tablet 3     No current facility-administered medications on file prior to visit.        ALLERGIES:  No Known Allergies    BP (!) 203/82  Pulse 68  Resp 16  SpO2 96%     No flowsheet data found.      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  He has a small area at the entrance of ear canal which is likely post radiation changes/healing but will need to be followed.  He is scheduled to see Dr. Núñez, his ENT, later this month for evalaution.  I will see him back in mid April with a CT scan of the head and neck one week prior to this appointment.  I asked him to call with any concerns or questions in the interim.       Thank  you very much for allowing me to participate in the care of this very pleasant patient.    Sincerely,      Karla Bush MD

## 2018-02-06 NOTE — PROGRESS NOTES
Subjective     No ref. provider found     Diagnosis Plan   1. 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  I had the pleasure of seeing Krzysztof Ward  [facility]. He returns today now almost 4 months out from completion of his radiation therapy for T2N0 squamous cell carcinoma of the left ear canal.  He completed a dose of 66Gy to the left ear canal on 10/12/17.  He saw his ENT, Dr. Núñez, in late January and had a CT scan of temporal bone on 1/31/18 at [facility] and open MRI which unfortunately shows progression of the soft tissue mass in left external auditory canal with osseous destruction, extending into middle ear, anterior mastoid air cells and ill-defined plaque like infiltrative component in the superior aspect of non-osseous superficial external auditory canal.  The mass measures 3.5cm in all dimensions.  The mass was noted to encroach upon the posterolateral inferior aspect of left temporal bone and possibly involves the anterior genu and tympanic segment of facial nerve.  The mass was also noted to involve the bone of the glenoid fossa with abnormality noted in the temporomandibular joint space possilby filling with tumor.   He states that the drainage from the left ear initially stopped but started back up again a few weeks ago.  He also reports trismus which began a few weeks ago to the point he could not open his mouth very wide to eat a sandwich.  He saw his chiropractor who instructed him to massage the muscle and gave him some exercises and this has helped significantly.  He is now able to open his mouth and eat a sandwich.  He reports some discomfort in the left ear canal and left temporal region but denies any significant pain.        Review of Systems   Constitutional: Positive for activity change and fatigue.   HENT: Positive for ear  discharge, ear pain, hearing loss and tinnitus.    Respiratory: Negative.    Cardiovascular: Negative.          Past Medical History:   Diagnosis Date   • Cancer    • H/O degenerative disc disease    • Hypertension    • Osteoporosis    • Skin cancer          Past Surgical History:   Procedure Laterality Date   • BACK SURGERY     • EXTERNAL EAR SURGERY     • REPLACEMENT TOTAL KNEE           Social History     Social History   • Marital status:      Spouse name: N/A   • Number of children: N/A   • Years of education: N/A     Social History Main Topics   • Smoking status: Former Smoker   • Smokeless tobacco: Former User      Comment: quit 1962   • Alcohol use Yes      Comment: a beer now and then   • Drug use: Yes     Special: Hydrocodone   • Sexual activity: No     Other Topics Concern   • Not on file     Social History Narrative         No family history on file.       Objective    Physical Exam   Constitutional: He appears well-developed and well-nourished.   HENT:   Ears:          Current Outpatient Prescriptions on File Prior to Visit   Medication Sig Dispense Refill   • acetaminophen (TYLENOL) 325 MG tablet Take by mouth.     • alendronate (FOSAMAX) 70 MG tablet TAKE 1 TABLET EVERY 7 DAYS 12 tablet 3   • baclofen (LIORESAL) 10 MG tablet Take 1 tablet by mouth 3 (Three) Times a Day As Needed for Muscle Spasms. 15 tablet 0   • celecoxib (CeleBREX) 200 MG capsule Take 1 capsule by mouth Daily. 90 capsule 3   • desonide (DESOWEN) 0.05 % cream Apply topically.     • famotidine (PEPCID) 20 MG tablet Take by mouth daily.     • Glucosamine HCl 500 MG tablet Take by mouth.     • HYDROcodone-acetaminophen (NORCO) 7.5-325 MG per tablet Take 1 tablet by mouth Every 8 (Eight) Hours As Needed for Moderate Pain . 90 tablet 0   • Multiple Vitamin (MULTI-VITAMIN) tablet Take by mouth.     • mupirocin (BACTROBAN) 2 % ointment Apply sparingly to the biopsy area 2 times a day  3   • ramipril (ALTACE) 10 MG capsule TAKE 2  CAPSULES DAILY 180 capsule 0   • senna-docusate (PERICOLACE) 8.6-50 MG per tablet Take by mouth.     • simvastatin (ZOCOR) 20 MG tablet Take 1 tablet by mouth Every Night. 90 tablet 3   • valsartan (DIOVAN) 160 MG tablet Take 1 tablet by mouth Daily. 90 tablet 3     No current facility-administered medications on file prior to visit.        ALLERGIES:  No Known Allergies    There were no vitals taken for this visit.     No flowsheet data found.      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 I have personally reviewed his CT scan images from January 31.  He now has evidence of progression of disease in the ear canal extending into the surrounding osseous structures.   I spoke with Dr. Ji Ingram with the [facility] Department of Radiation, and he will schedule Mr. Ward to be seen in the multidisciplinary head and neck clinic this Friday, February 9.  They will call Mr. Ward with the appointment.  We will forward our records to them today.  I explained all this in great detail to Mr. Ward and his son and they voiced understanding.  Mr. Ward indicates he is not keen on surgery but will listen to the options.  I will see him back in 3-4 months just to follow up with him.   I asked him to call with any concerns or questions in the interim.             Sincerely,      Karla Bush MD

## 2018-03-01 PROBLEM — E87.1 HYPONATREMIA: Status: ACTIVE | Noted: 2018-01-01

## 2018-03-01 PROBLEM — N39.0 UTI (URINARY TRACT INFECTION): Status: ACTIVE | Noted: 2018-01-01
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
## 2018-03-01 PROBLEM — I48.92 ATRIAL FLUTTER (HCC): Status: ACTIVE | Noted: 2018-01-01

## 2018-03-01 NOTE — ED PROVIDER NOTES
EMERGENCY DEPARTMENT ENCOUNTER    CHIEF COMPLAINT  Chief Complaint: L facial droop  History given by: pt, son  History limited by: none   Room Number: 19/19  PMD: Willie Ramirez MD      HPI:  Pt is a 88 y.o. male who presents complaining of fall last night while ambulating to the restroom at 0230 today, and was not able to get up due to generalized weakness. Per pt's son, he found the pt this AM with L facial droop that appeared was worsened from the past 1.5 weeks. Per the pt's son, the pt's facial droop was not present 2 weeks ago. Pt denies head injury in his fall. Pt states that he was seen here last year for radiation treatment for squamous cell carcinoma, and that he has a bone biopsy scheduled tomorrow at [facility]. Per the pt's son, the pt's radiologist has discussed with the pt that he may have nerve damage leading to facial droop and poor gait secondary to radiation therapy. Pt denies a Hx A-flutter.    Duration:  Worsened since this AM  Onset: gradual   Timing: constant   Location: L facial  Quality: droop  Intensity/Severity: moderate   Progression: unchanged   Associated Symptoms: generalized weakness   Aggravating Factors: none stated   Alleviating Factors: none stated   Previous Episodes: moderate L facial droop for the past 1.5 weeks   Treatment before arrival: none     PAST MEDICAL HISTORY  Active Ambulatory Problems     Diagnosis Date Noted   • Abnormal serum creatinine level 02/23/2016   • Arthritis 02/23/2016   • Cough 02/23/2016   • Degeneration of intervertebral disc 02/23/2016   • Dysuria 02/23/2016   • Hyperlipidemia 02/23/2016   • Hypertension 02/23/2016   • Low back pain 02/23/2016   • Neuralgia 02/23/2016   • Osteoporosis 02/23/2016   • Persistent cough 06/21/2016   • Chest rales 06/21/2016   • Elevated PSA 03/06/2017   • 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 No pertinent family history.    SOCIAL HISTORY  Social History     Social History   • Marital status:      Spouse name: N/A   • Number of children: N/A   • Years of education: N/A     Occupational History   • Not on file.     Social History Main Topics   • Smoking status: Former Smoker   • Smokeless tobacco: Former User      Comment: quit 1962   • Alcohol use Yes      Comment: a beer now and then   • Drug use: Yes     Special: Hydrocodone   • Sexual activity: No     Other Topics Concern   • Not on file     Social History Narrative       ALLERGIES  Review of patient's allergies indicates no known allergies.    REVIEW OF SYSTEMS  Review of Systems   Constitutional: Negative for activity change, appetite change and fever.   HENT: Negative for congestion and sore throat.    Eyes: Negative.    Respiratory: Negative for cough and shortness of breath.    Cardiovascular: Negative for chest pain and leg swelling.   Gastrointestinal: Negative for abdominal pain, diarrhea and vomiting.   Endocrine: Negative.    Genitourinary: Negative for decreased urine volume and dysuria.   Musculoskeletal: Negative for neck pain.   Skin: Negative for rash and wound.   Allergic/Immunologic: Negative.    Neurological: Positive for facial asymmetry (L droop) and weakness (generalized). Negative for numbness and headaches.   Hematological: Negative.    Psychiatric/Behavioral: Negative.    All other systems reviewed and are negative.      PHYSICAL EXAM  ED Triage Vitals   Temp Heart Rate Resp BP SpO2   03/01/18 1208 03/01/18 1207 03/01/18 1207 03/01/18 1207 03/01/18 1207   96.1 °F (35.6  °C) 92 16 182/106 96 %      Temp src Heart Rate Source Patient Position BP Location FiO2 (%)   03/01/18 1208 -- -- -- --   Oral           Physical Exam   Constitutional: He is oriented to person, place, and time and well-developed, well-nourished, and in no distress.   HENT:   Head: Normocephalic and atraumatic.   Eyes: EOM are normal. Pupils are equal, round, and reactive to light.   Neck: Normal range of motion. Neck supple.   Cardiovascular: Normal rate, regular rhythm and normal heart sounds.    Pulmonary/Chest: Effort normal and breath sounds normal. No respiratory distress.   Abdominal: Soft. There is no tenderness. There is no rebound and no guarding.   Musculoskeletal: Normal range of motion. He exhibits no edema.   Neurological: He is alert and oriented to person, place, and time. He has normal sensation and normal strength. He displays facial asymmetry (L droop, with L ptosis).   Skin: Skin is warm and dry.   Surgical changes to L ear   Psychiatric: Mood and affect normal.   Nursing note and vitals reviewed.      LAB RESULTS  Lab Results (last 24 hours)     Procedure Component Value Units Date/Time    POC Glucose Once [[id number]]  (Normal) Collected:  03/01/18 1221    Specimen:  Blood Updated:  03/01/18 1223     Glucose 115 mg/dL     Narrative:       Meter: [id number] : [id number] Prem Ela TINYbobbi    CBC & Differential [[id number]] Collected:  03/01/18 1243    Specimen:  Blood Updated:  03/01/18 1254    Narrative:       The following orders were created for panel order CBC & Differential.  Procedure                               Abnormality         Status                     ---------                               -----------         ------                     CBC Auto Differential[[id number]]        Abnormal            Final result                 Please view results for these tests on the individual orders.    Comprehensive Metabolic Panel [[id number]]  (Abnormal) Collected:  03/01/18 1243     Specimen:  Blood Updated:  03/01/18 1316     Glucose 116 (H) mg/dL      BUN 16 mg/dL      Creatinine 0.71 (L) mg/dL      Sodium 130 (L) mmol/L      Potassium 4.6 mmol/L      Chloride 91 (L) mmol/L      CO2 27.0 mmol/L      Calcium 10.1 mg/dL      Total Protein 7.8 g/dL      Albumin 3.80 g/dL      ALT (SGPT) 27 U/L      AST (SGOT) 34 U/L      Alkaline Phosphatase 107 U/L      Total Bilirubin 0.9 mg/dL      eGFR Non African Amer 105 mL/min/1.73      Globulin 4.0 gm/dL      A/G Ratio 1.0 g/dL      BUN/Creatinine Ratio 22.5     Anion Gap 12.0 mmol/L     Narrative:       The MDRD GFR formula is only valid for adults with stable renal function between ages 18 and 70.    Troponin [[id number]]  (Normal) Collected:  03/01/18 1243    Specimen:  Blood Updated:  03/01/18 1323     Troponin T <0.010 ng/mL     Narrative:       Troponin T Reference Ranges:  Less than 0.03 ng/mL:    Negative for AMI  0.03 to 0.09 ng/mL:      Indeterminant for AMI  Greater than 0.09 ng/mL: Positive for AMI    Magnesium [[id number]]  (Normal) Collected:  03/01/18 1243    Specimen:  Blood Updated:  03/01/18 1316     Magnesium 1.8 mg/dL     CBC Auto Differential [[id number]]  (Abnormal) Collected:  03/01/18 1243    Specimen:  Blood Updated:  03/01/18 1254     WBC 13.34 (H) 10*3/mm3      RBC 4.54 (L) 10*6/mm3      Hemoglobin 13.9 g/dL      Hematocrit 40.9 %      MCV 90.1 fL      MCH 30.6 pg      MCHC 34.0 g/dL      RDW 12.4 %      RDW-SD 40.7 fl      MPV 10.0 fL      Platelets 387 10*3/mm3      Neutrophil % 85.9 (H) %      Lymphocyte % 7.8 (L) %      Monocyte % 5.8 %      Eosinophil % 0.1 (L) %      Basophil % 0.1 %      Immature Grans % 0.3 %      Neutrophils, Absolute 11.46 (H) 10*3/mm3      Lymphocytes, Absolute 1.04 10*3/mm3      Monocytes, Absolute 0.78 10*3/mm3      Eosinophils, Absolute 0.01 10*3/mm3      Basophils, Absolute 0.01 10*3/mm3      Immature Grans, Absolute 0.04 (H) 10*3/mm3     CK [[id number]]  (Abnormal) Collected:  03/01/18 1242     "Specimen:  Blood Updated:  03/01/18 1316     Creatine Kinase 895 (H) U/L     BNP [[id number]]  (Normal) Collected:  03/01/18 1243    Specimen:  Blood Updated:  03/01/18 1441     proBNP 489.1 pg/mL     Narrative:       Among patients with dyspnea, NT-proBNP is highly sensitive for the detection of acute congestive heart failure. In addition NT-proBNP of <300 pg/ml effectively rules out acute congestive heart failure with 99% negative predictive value.    Procalcitonin [[id number]]  (Abnormal) Collected:  03/01/18 1243    Specimen:  Blood Updated:  03/01/18 1507     Procalcitonin 0.05 (L) ng/mL     Narrative:       As a Marker for Sepsis (Non-Neonates):   1. <0.5 ng/mL represents a low risk of severe sepsis and/or septic shock.  1. >2 ng/mL represents a high risk of severe sepsis and/or septic shock.    As a Marker for Lower Respiratory Tract Infections that require antibiotic therapy:  PCT on Admission     Antibiotic Therapy             6-12 Hrs later  > 0.5                Strongly Recommended            >0.25 - <0.5         Recommended  0.1 - 0.25           Discouraged                   Remeasure/reassess PCT  <0.1                 Strongly Discouraged          Remeasure/reassess PCT      As 28 day mortality risk marker: \"Change in Procalcitonin Result\" (> 80 % or <=80 %) if Day 0 (or Day 1) and Day 4 values are available. Refer to http://www.[employer]-pct-calculator.com/   Change in PCT <=80 %   A decrease of PCT levels below or equal to 80 % defines a positive change in PCT test result representing a higher risk for 28-day all-cause mortality of patients diagnosed with severe sepsis or septic shock.  Change in PCT > 80 %   A decrease of PCT levels of more than 80 % defines a negative change in PCT result representing a lower risk for 28-day all-cause mortality of patients diagnosed with severe sepsis or septic shock.                Urinalysis With / Culture If Indicated - Urine, Clean Catch [[id number]]  (Abnormal) " Collected:  03/01/18 1244    Specimen:  Urine from Urine, Clean Catch Updated:  03/01/18 1259     Color, UA Yellow     Appearance, UA Cloudy (A)     pH, UA 6.5     Specific Gravity, UA 1.014     Glucose, UA Negative     Ketones, UA 15 mg/dL (1+) (A)     Bilirubin, UA Negative     Blood, UA Trace (A)     Protein, UA Trace (A)     Leuk Esterase, UA Moderate (2+) (A)     Nitrite, UA Positive (A)     Urobilinogen, UA 0.2 E.U./dL    Urinalysis, Microscopic Only - Urine, Clean Catch [[id number]]  (Abnormal) Collected:  03/01/18 1244    Specimen:  Urine from Urine, Clean Catch Updated:  03/01/18 1259     RBC, UA 0-2 /HPF      WBC, UA Too Numerous to Count (A) /HPF      Bacteria, UA 1+ (A) /HPF      Squamous Epithelial Cells, UA 0-2 /HPF      Hyaline Casts, UA 7-12 /LPF      Methodology Automated Microscopy    Urine Culture - Urine, Urine, Clean Catch [[id number]] Collected:  03/01/18 1244    Specimen:  Urine from Urine, Clean Catch Updated:  03/01/18 1257          I ordered the above labs and reviewed the results    RADIOLOGY  XR Chest 1 View   Final Result   Minimal likely scar atelectasis at the right base. Tortuous   aorta. Follow-up as clinically indicated.       This report was finalized on 3/1/2018 1:27 PM by Dr. Trace Ng MD.          CT Head Without Contrast    (Results Pending)   CT Facial Bones Without Contrast    (Results Pending)    CT head and facial bones: no traumatic injuries, impossible to discern tumor from infection.     I ordered the above noted radiological studies. Interpreted by radiologist. Discussed with radiologist (Dr. Moncada). Reviewed by me in PACS.       PROCEDURES  Procedures  EKG           EKG time: 1229  Rhythm/Rate: A flutter, ventricular rate of 86  P waves and MO: absent   QRS, axis: LAFB    ST and T waves: nonspecific ST T wave changes      Interpreted Contemporaneously by me, independently viewed  changed compared to prior 4/12/13  LAFB is new      PROGRESS AND CONSULTS  ED  Course   1256  Ordered CT facial bones and CT head for further evaluation.  1257  Ordered labs for further evaluation.   1423  Ordered labs for further evaluation.    1433  Received a call from Dr. Moncada and discussed pt's case. Dr. Moncada described the pt's CT head with findings of no traumatic injuries, but states that it was impossible to discern infection from tumor, and requested a MRI with and without contrast, and an MRI maxilla with and without. Ordered consult to A.  1503  Received a call from Dr. Hart and discussed pt's case. Dr. Hart agreed to admit the pt.    MEDICAL DECISION MAKING  Results were reviewed/discussed with the patient and they were also made aware of online access. Pt also made aware that some labs, such as cultures, will not be resulted during ER visit and follow up with PMD is necessary.     MDM  Number of Diagnoses or Management Options  Acute UTI, possible:   Atrial flutter, unspecified type:   Facial nerve palsy:   Traumatic rhabdomyolysis, initial encounter:      Amount and/or Complexity of Data Reviewed  Clinical lab tests: reviewed and ordered (Nitrite U: positive. Bacteria U 1+, Creatinine 0.71, Troponin <0.010, Mg 1.8, WBC 13.34)  Tests in the radiology section of CPT®: reviewed and ordered (CT head: no traumatic injuries, impossible to discern infection from tumor. CXR: minimal scar atelectasis RLL)  Tests in the medicine section of CPT®: reviewed and ordered (See procedures section for EKG)  Discussion of test results with the performing providers: yes (Dr. Moncada)  Obtain history from someone other than the patient: yes (Pt's son)  Discuss the patient with other providers: (Dr. Hart)    Patient Progress  Patient progress: stable         DIAGNOSIS  Final diagnoses:   Atrial flutter, unspecified type   Traumatic rhabdomyolysis, initial encounter   Acute UTI, possible   Facial nerve palsy       DISPOSITION  ADMISSION by Dr. Hart    Discussed treatment plan and reason for admission  with pt/family and admitting physician.  Pt/family voiced understanding of the plan for admission for further testing/treatment as needed.     Latest Documented Vital Signs:  As of 3:09 PM  BP- (!) 203/96 HR- 82 Temp- 96.1 °F (35.6 °C) (Oral) O2 sat- 96%    --  Documentation assistance provided by nick Thomas for Dr. Herron.  Information recorded by the scribe was done at my direction and has been verified and validated by me.           Oscar Thomas  03/01/18 4552       Jordan Herron MD  03/01/18 9000

## 2018-03-01 NOTE — ED NOTES
Family says that left facial drop has been there, however it appears worse, and eye appears reder. states physician has stated they could expect this as with his radiation and facial CA     Ann Birch RN  03/01/18 9324

## 2018-03-01 NOTE — PLAN OF CARE
Problem: Patient Care Overview (Adult)  Goal: Plan of Care Review  Outcome: Ongoing (interventions implemented as appropriate)   03/01/18 6160   Coping/Psychosocial Response Interventions   Plan Of Care Reviewed With patient;son;daughter   Patient Care Overview   Progress no change   Outcome Evaluation   Outcome Summary/Follow up Plan PT ALERT AND ORIENTED X4 ADMITTED TO THE UNIT FOR A FLUTTER, UTI AFTER A FALL FROM EARLY THIS MORNING AT HOME BEING UNABLE TO GET UP. PT C/O PAIN TO BACK 10/10 FROM DDD. PT ON ROOM AIR. DR SMITH PLACED ORDERS. NO ACUTE DISTRESS NOTED, WILL CONTINUE TO MONITOR. FAMILY AT BEDSIDE TO MONITOR AND WAIT FOR DR SMITH TO ARRIVE.     Goal: Adult Individualization and Mutuality  Outcome: Ongoing (interventions implemented as appropriate)    Goal: Discharge Needs Assessment  Outcome: Ongoing (interventions implemented as appropriate)      Problem: Pain, Chronic (Adult)  Goal: Identify Related Risk Factors and Signs and Symptoms  Outcome: Ongoing (interventions implemented as appropriate)    Goal: Acceptable Pain Control/Comfort Level  Outcome: Ongoing (interventions implemented as appropriate)      Problem: Fall Risk (Adult)  Goal: Identify Related Risk Factors and Signs and Symptoms  Outcome: Ongoing (interventions implemented as appropriate)    Goal: Absence of Falls  Outcome: Ongoing (interventions implemented as appropriate)      Problem: Infection, Risk/Actual (Adult)  Goal: Identify Related Risk Factors and Signs and Symptoms  Outcome: Ongoing (interventions implemented as appropriate)    Goal: Infection Prevention/Resolution  Outcome: Ongoing (interventions implemented as appropriate)

## 2018-03-01 NOTE — H&P
Name: Krzysztof Ward ADMIT: 3/1/2018   : 1929  PCP: Willie Ramirez MD    MRN: [id number] LOS: 0 days   AGE/SEX: 88 y.o. male  ROOM: 651/1     Chief Complaint   Patient presents with   • Fall     0230 this morning, son found this morning.         History of Present Illness  88-year-old male who was found down by his family today.  The patient reports that he is gotten up at around 2:30 AM to urinate.  He was walking back to his bedroom when his legs suddenly became weak and he ended up not being able to support himself.  He was using his walker and was able to somewhat gradually lower himself down, but at that point he could not get back up.  He denies loss of consciousness or actual syncope.  He had to lie there until his family found him this morning.  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  He underwent radiation therapy but a follow-up CT showed progression of the disease so he was referred to the [facility].  An MRI was done at [facility] about 2 weeks ago.  He was seen at the multidisciplinary head and neck clinic and plans were for a biopsy to be done and then a determination made on treatment.      Past Medical History:   Diagnosis Date   • H/O degenerative disc disease    • Hypertension    • Osteoporosis    • Skin cancer      Past Surgical History:   Procedure Laterality Date   • BACK SURGERY     • EXTERNAL EAR SURGERY     • REPLACEMENT TOTAL KNEE Left        Allergies:  Review of patient's allergies indicates no known allergies.    Prescriptions Prior to Admission   Medication Sig Dispense Refill Last Dose   • alendronate (FOSAMAX) 70 MG tablet TAKE 1 TABLET EVERY 7 DAYS 12 tablet 3    • baclofen (LIORESAL) 10 MG tablet Take 1 tablet by mouth 3 (Three) Times a Day As Needed for Muscle Spasms. 15 tablet 0    • celecoxib (CeleBREX) 200 MG capsule Take 1 capsule by mouth Daily. 90 capsule 3 Taking   • desonide  (DESOWEN) 0.05 % cream Apply topically.   Taking   • famotidine (PEPCID) 20 MG tablet Take by mouth daily.   Taking   • Glucosamine HCl 500 MG tablet Take by mouth.   Taking   • HYDROcodone-acetaminophen (NORCO) 7.5-325 MG per tablet Take 1 tablet by mouth Every 8 (Eight) Hours As Needed for Moderate Pain . 90 tablet 0    • Multiple Vitamin (MULTI-VITAMIN) tablet Take by mouth.   Taking   • mupirocin (BACTROBAN) 2 % ointment Apply sparingly to the biopsy area 2 times a day  3 Taking   • ramipril (ALTACE) 10 MG capsule TAKE 2 CAPSULES DAILY 180 capsule 0    • senna-docusate (PERICOLACE) 8.6-50 MG per tablet Take by mouth.   Taking   • simvastatin (ZOCOR) 20 MG tablet Take 1 tablet by mouth Every Night. 90 tablet 3 Taking   • valsartan (DIOVAN) 160 MG tablet Take 1 tablet by mouth Daily. 90 tablet 3 Taking       Social History   Substance Use Topics   • Smoking status: Former Smoker   • Smokeless tobacco: Former User      Comment: quit 1962   • Alcohol use Yes      Comment: a beer now and then       History reviewed. No pertinent family history.    Review of Systems   Constitutional: weight stable. Appetite good. No recent fevers or chills.  HEENT: has eye drops for elevated pressure in eyes. Also using lubricant drops. Has constant dry mouth  Respiratory: negative for shortness of breath, PND or CHING. No history of wheezing.  No cough or chest tightness.   Cardiovascular: No chest pain or palpitations.  No problems with edema  Gastrointestinal: No problems with heartburn or indigestion. Bowel movements normal. No blood noted in stools. No melena  Endocrine:  no diabetes   Genitourinary: No difficulty urinating, dysuria or frequency.   Musculoskeletal: some generalized joint pains from OA  Skin: see HPI  Neurological: Negative for headaches.   Hematological:  Does not bruise/bleed easily. No h/o DVTs  Psychiatric/Behavioral: No confusion. The patient is not nervous/anxious.       Objective    Vital Signs  Temp:  [96.1  °F (35.6 °C)-98.6 °F (37 °C)] 97.5 °F (36.4 °C)  Heart Rate:  [70-94] 94  Resp:  [16-20] 20  BP: (172-203)/() 172/66  SpO2:  [93 %-97 %] 94 %  on   ;   O2 Device: room air  Body mass index is 24.82 kg/(m^2).    Physical Exam   Elderly male in NAD  PERRL, EOMI, sclera nonicteric. 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 Oropharynx abnormal with some fullness of the left lateral wall of the faunces although the palate seems to elevate symmetrically. Neck supple without adenopathy or thyromegaly. No JVD.  Lungs clear with equal breath sounds bilaterally. No wheezes, rales or rhonchi. Breathing nonlabored  Heart RRR without murmur, gallop or rub.   Back no kyphosis  Abdomen soft, nontender, bowel sounds present throughout.  Extremities no cyanosis, clubbing or edema.   Skin warm & dry  Neuro no gross motor deficits, alert & oriented. Speech fluent.       Results Review:   I reviewed the patient's new clinical results.    Lab Results (last 24 hours)     Procedure Component Value Units Date/Time    POC Glucose Once [[id number]]  (Normal) Collected:  03/01/18 1221    Specimen:  Blood Updated:  03/01/18 1223     Glucose 115 mg/dL     Narrative:       Meter: [id number] : [id number] Prem Benites    CBC & Differential [[id number]] Collected:  03/01/18 1243    Specimen:  Blood Updated:  03/01/18 1254    Narrative:       The following orders were created for panel order CBC & Differential.  Procedure                               Abnormality         Status                     ---------                               -----------         ------                     CBC Auto Differential[[id number]]        Abnormal            Final result                 Please view results for these tests on the individual orders.    Comprehensive Metabolic Panel [[id number]]  (Abnormal) Collected:  03/01/18 1243    Specimen:  Blood Updated:  03/01/18 1316     Glucose 116 (H) mg/dL      BUN 16 mg/dL       Creatinine 0.71 (L) mg/dL      Sodium 130 (L) mmol/L      Potassium 4.6 mmol/L      Chloride 91 (L) mmol/L      CO2 27.0 mmol/L      Calcium 10.1 mg/dL      Total Protein 7.8 g/dL      Albumin 3.80 g/dL      ALT (SGPT) 27 U/L      AST (SGOT) 34 U/L      Alkaline Phosphatase 107 U/L      Total Bilirubin 0.9 mg/dL      eGFR Non African Amer 105 mL/min/1.73      Globulin 4.0 gm/dL      A/G Ratio 1.0 g/dL      BUN/Creatinine Ratio 22.5     Anion Gap 12.0 mmol/L     Narrative:       The MDRD GFR formula is only valid for adults with stable renal function between ages 18 and 70.    Troponin [[id number]]  (Normal) Collected:  03/01/18 1243    Specimen:  Blood Updated:  03/01/18 1323     Troponin T <0.010 ng/mL     Narrative:       Troponin T Reference Ranges:  Less than 0.03 ng/mL:    Negative for AMI  0.03 to 0.09 ng/mL:      Indeterminant for AMI  Greater than 0.09 ng/mL: Positive for AMI    Magnesium [[id number]]  (Normal) Collected:  03/01/18 1243    Specimen:  Blood Updated:  03/01/18 1316     Magnesium 1.8 mg/dL     CBC Auto Differential [[id number]]  (Abnormal) Collected:  03/01/18 1243    Specimen:  Blood Updated:  03/01/18 1254     WBC 13.34 (H) 10*3/mm3      RBC 4.54 (L) 10*6/mm3      Hemoglobin 13.9 g/dL      Hematocrit 40.9 %      MCV 90.1 fL      MCH 30.6 pg      MCHC 34.0 g/dL      RDW 12.4 %      RDW-SD 40.7 fl      MPV 10.0 fL      Platelets 387 10*3/mm3      Neutrophil % 85.9 (H) %      Lymphocyte % 7.8 (L) %      Monocyte % 5.8 %      Eosinophil % 0.1 (L) %      Basophil % 0.1 %      Immature Grans % 0.3 %      Neutrophils, Absolute 11.46 (H) 10*3/mm3      Lymphocytes, Absolute 1.04 10*3/mm3      Monocytes, Absolute 0.78 10*3/mm3      Eosinophils, Absolute 0.01 10*3/mm3      Basophils, Absolute 0.01 10*3/mm3      Immature Grans, Absolute 0.04 (H) 10*3/mm3     CK [[id number]]  (Abnormal) Collected:  03/01/18 1243    Specimen:  Blood Updated:  03/01/18 1316     Creatine Kinase 895 (H) U/L     BNP [[id number]]   "(Normal) Collected:  03/01/18 1243    Specimen:  Blood Updated:  03/01/18 1441     proBNP 489.1 pg/mL     Narrative:       Among patients with dyspnea, NT-proBNP is highly sensitive for the detection of acute congestive heart failure. In addition NT-proBNP of <300 pg/ml effectively rules out acute congestive heart failure with 99% negative predictive value.    Procalcitonin [[id number]]  (Abnormal) Collected:  03/01/18 1243    Specimen:  Blood Updated:  03/01/18 1507     Procalcitonin 0.05 (L) ng/mL     Narrative:       As a Marker for Sepsis (Non-Neonates):   1. <0.5 ng/mL represents a low risk of severe sepsis and/or septic shock.  1. >2 ng/mL represents a high risk of severe sepsis and/or septic shock.    As a Marker for Lower Respiratory Tract Infections that require antibiotic therapy:  PCT on Admission     Antibiotic Therapy             6-12 Hrs later  > 0.5                Strongly Recommended            >0.25 - <0.5         Recommended  0.1 - 0.25           Discouraged                   Remeasure/reassess PCT  <0.1                 Strongly Discouraged          Remeasure/reassess PCT      As 28 day mortality risk marker: \"Change in Procalcitonin Result\" (> 80 % or <=80 %) if Day 0 (or Day 1) and Day 4 values are available. Refer to http://www.[employer]-pct-calculator.com/   Change in PCT <=80 %   A decrease of PCT levels below or equal to 80 % defines a positive change in PCT test result representing a higher risk for 28-day all-cause mortality of patients diagnosed with severe sepsis or septic shock.  Change in PCT > 80 %   A decrease of PCT levels of more than 80 % defines a negative change in PCT result representing a lower risk for 28-day all-cause mortality of patients diagnosed with severe sepsis or septic shock.                Urinalysis With / Culture If Indicated - Urine, Clean Catch [[id number]]  (Abnormal) Collected:  03/01/18 1244    Specimen:  Urine from Urine, Clean Catch Updated:  03/01/18 1259     " Color, UA Yellow     Appearance, UA Cloudy (A)     pH, UA 6.5     Specific Gravity, UA 1.014     Glucose, UA Negative     Ketones, UA 15 mg/dL (1+) (A)     Bilirubin, UA Negative     Blood, UA Trace (A)     Protein, UA Trace (A)     Leuk Esterase, UA Moderate (2+) (A)     Nitrite, UA Positive (A)     Urobilinogen, UA 0.2 E.U./dL    Urinalysis, Microscopic Only - Urine, Clean Catch [[id number]]  (Abnormal) Collected:  03/01/18 1244    Specimen:  Urine from Urine, Clean Catch Updated:  03/01/18 1259     RBC, UA 0-2 /HPF      WBC, UA Too Numerous to Count (A) /HPF      Bacteria, UA 1+ (A) /HPF      Squamous Epithelial Cells, UA 0-2 /HPF      Hyaline Casts, UA 7-12 /LPF      Methodology Automated Microscopy    Urine Culture - Urine, Urine, Clean Catch [[id number]] Collected:  03/01/18 1244    Specimen:  Urine from Urine, Clean Catch Updated:  03/01/18 1257            Results from last 7 days  Lab Units 03/01/18  1243   WBC 10*3/mm3 13.34*   HEMOGLOBIN g/dL 13.9   PLATELETS 10*3/mm3 387       Results from last 7 days  Lab Units 03/01/18  1243   SODIUM mmol/L 130*   POTASSIUM mmol/L 4.6   CHLORIDE mmol/L 91*   CO2 mmol/L 27.0   BUN mg/dL 16   CREATININE mg/dL 0.71*   GLUCOSE mg/dL 116*   ALBUMIN g/dL 3.80   BILIRUBIN mg/dL 0.9   ALK PHOS U/L 107   AST (SGOT) U/L 34   ALT (SGPT) U/L 27   Estimated Creatinine Clearance: 74.9 mL/min (by C-G formula based on Cr of 0.71).    Results from last 7 days  Lab Units 03/01/18  1243   CK TOTAL U/L 895*   TROPONIN T ng/mL <0.010   PROBNP pg/mL 489.1         Invalid input(s): LDLCALC    Results from last 7 days  Lab Units 03/01/18  1244   NITRITE UA  Positive*   WBC UA /HPF Too Numerous to Count*   BACTERIA UA /HPF 1+*   SQUAM EPITHEL UA /HPF 0-2       CT Head Without Contrast   Preliminary Result       There is no evidence for an acute traumatic abnormality to the head or   maxillofacial region. However, there is a known squamous cell carcinoma   of the left ear and external auditory  canal. A portion of the posterior   aspect of the left pinna has been resected. The left external auditory   canal is completely opacified. Also, there is complete opacification of   the left mastoid air cells and middle ear cavity. There is erosion of   the left tegmen tympani and there is a large bony defect along the floor   of the left middle cranial fossa as a result. Furthermore, there is   erosion of the posterior and superior wall of the left temporal   mandibular joint. A soft tissue density abnormality is appreciated at   the site of the erosion of the tegmen tympani. A fluid density   abnormality is seen just anterior to the left mandibular condyle. The   findings may represent some combination of tumor along with fluid within   the left temporal bone. The possibility of a superimposed infection   could not be excluded. The fluid density anterior to the left   temporomandibular joint once again could be noninfected fluid or   purulent material. Ultimately, I recommend performing a dedicated MRI of   the maxillofacial region as well as an MRI of the brain with and without   contrast to further characterize these abnormalities and to define the   extent of intracranial abnormalities as a consequence of this pathology.       These findings and recommendations were discussed with Dr. Herron on   03/01/2018 at approximately 2:30 PM.       Radiation dose reduction techniques were utilized, including automated   exposure control and exposure modulation based on body size.              CT Facial Bones Without Contrast   Preliminary Result       There is no evidence for an acute traumatic abnormality to the head or   maxillofacial region. However, there is a known squamous cell carcinoma   of the left ear and external auditory canal. A portion of the posterior   aspect of the left pinna has been resected. The left external auditory   canal is completely opacified. Also, there is complete opacification of   the  left mastoid air cells and middle ear cavity. There is erosion of   the left tegmen tympani and there is a large bony defect along the floor   of the left middle cranial fossa as a result. Furthermore, there is   erosion of the posterior and superior wall of the left temporal   mandibular joint. A soft tissue density abnormality is appreciated at   the site of the erosion of the tegmen tympani. A fluid density   abnormality is seen just anterior to the left mandibular condyle. The   findings may represent some combination of tumor along with fluid within   the left temporal bone. The possibility of a superimposed infection   could not be excluded. The fluid density anterior to the left   temporomandibular joint once again could be noninfected fluid or   purulent material. Ultimately, I recommend performing a dedicated MRI of   the maxillofacial region as well as an MRI of the brain with and without   contrast to further characterize these abnormalities and to define the   extent of intracranial abnormalities as a consequence of this pathology.       These findings and recommendations were discussed with Dr. Herron on   03/01/2018 at approximately 2:30 PM.       Radiation dose reduction techniques were utilized, including automated   exposure control and exposure modulation based on body size.              XR Chest 1 View   Final Result   Minimal likely scar atelectasis at the right base. Tortuous   aorta. Follow-up as clinically indicated.       This report was finalized on 3/1/2018 1:27 PM by Dr. Trace gN MD.            Assessment/Plan   Assessment:     Active Hospital Problems (** Indicates Principal Problem)    Diagnosis Date Noted   • Atrial flutter [I48.92] 03/01/2018   • Hyponatremia [E87.1] 03/01/2018   • Fall [W19.XXXA] 03/01/2018   • UTI (urinary tract infection) [N39.0] 03/01/2018   • 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 [C44.229] 08/16/2017   • Hypertension  [I10] 02/23/2016      Resolved Hospital Problems    Diagnosis Date Noted Date Resolved   No resolved problems to display.       Plan:     I have started the patient on subcutaneous Lovenox at full dose because of the atrial flutter.  He is supposed to have a biopsy done and it will be easier to make adjustments with the Lovenox than with other agents.  I asked cardiology to see him although I suspect that they will not recommend any treatment for this at this time.  His rate is currently controlled.  I have also asked Dr. Núñez to see him.  I didn't know if perhaps the biopsy could be done here to facilitate getting things done so that he can be started on some type of treatment.  I have also requested the MRI report from [facility].  I am going to have PT to see him.  I started him on Rocephin for the presumed UTI.  He does not have any symptoms of dysuria, but this may have contributed some to his falling and being weaker so I have elected to treat it.        Pt was seen prior to midnight on 3/1    Fern Hart MD  [facility]  03/01/18  9:07 PM

## 2018-03-02 PROBLEM — M62.82 NON-TRAUMATIC RHABDOMYOLYSIS: Status: ACTIVE | Noted: 2018-01-01

## 2018-03-02 NOTE — PROGRESS NOTES
[facility] Planning Assessment  [facility]     Patient Name: Krzysztof Ward  MRN: [id number]  Today's Date: 3/2/2018    Admit Date: 3/1/2018          Discharge Needs Assessment       03/02/18 1448    Living Environment    Lives With alone    Living Arrangements house    Transportation Available car;family or friend will provide    Living Environment    Quality Of Family Relationships supportive    Able to Return to Prior Living Arrangements yes    Discharge Needs Assessment    Concerns To Be Addressed discharge planning concerns    Readmission Within The Last 30 Days no previous admission in last 30 days    Equipment Currently Used at Home cane, straight            Discharge Plan       03/02/18 1456    Case Management/Social Work Plan    Plan [facility] pending bed avalilability and [employer] pre-cert     Additional Comments Spoke with patient and son Krzysztof Ward Jr [phone] at bedside, patient currently lives alone in a single level house with a basement. Patient stated he uses a Rolling Walker for ambulation and is independent with ADL's. Son stated he is planning to make patient's home more accessible for his Rolling Walker. Patient and son would like a referral to [facility] at discharge. Placed in [employer] in basket and packet in CCP office. [facility]         Discharge Placement     Facility/Agency Request Status Selected? Address Phone Number Fax Number    [facility] Pending - Request Sent     [address] [address] [phone]                Demographic Summary       03/02/18 1437    Referral Information    Admission Type inpatient    Arrived From home or self-care    Primary Care Physician Information    Name Willie Mcclelland MD     Phone [phone]            Functional Status       03/02/18 1445    Functional Status Prior    Ambulation 1-->assistive equipment    Transferring 1-->assistive equipment    Toileting 1-->assistive equipment    Bathing  0-->independent    Dressing 0-->independent    Eating 0-->independent    Communication 0-->understands/communicates without difficulty    Swallowing 0-->swallows foods/liquids without difficulty    IADL    Medications independent    Meal Preparation independent    Housekeeping independent    Laundry assistive person    Shopping assistive person    Oral Care independent            Psychosocial     None            Abuse/Neglect     None            Legal     None            Substance Abuse     None            Patient Forms     None          Tonja Morrow, RN

## 2018-03-02 NOTE — THERAPY EVALUATION
Acute Care - Speech Language Pathology   Swallow Initial Evaluation [facility]     Patient Name: Krzysztof Ward  : 1929  MRN: [id number]  Today's Date: 3/2/2018  Onset of Illness/Injury or Date of Surgery Date: 18            Admit Date: 3/1/2018    SPEECH-LANGUAGE PATHOLOGY EVALUATION - SWALLOW  Subjective: The patient was seen on this date for a Clinical Swallow evaluation.  Patient was alert and cooperative.  Significant history: Pt admitted after being found down by family. Pt with h/o L squamous cell ear CA, post radiation. Pt has had an increase in L side facial droop and there is a concern for reoccurrence.  Objective: Textures given included ice, thin liquid, puree consistency, mechanical soft consistency and regular consistency.  Assessment: Difficulties were noted with none of the above consistencies. Swallow was timely with adequate elevation. Vocal quality remained clear throughout eval. Pt tolerated thins via cup and straw.  SLP Findings:  Patient presents with functional swallow, without esophageal component.   Recommendations: Diet Textures: thin liquid, regular consistency food.  Medications should be taken whole with thin liquids.    Recommended Strategies: Upright for PO, small bites and sips and may use straw. Oral care before breakfast, after all meals and PRN.    Dysphagia therapy is not recommended. Rationale: swallow appeared functional.      Visit Dx:     ICD-10-CM ICD-9-CM   1. Atrial flutter, unspecified type I48.92 427.32   2. Traumatic rhabdomyolysis, initial encounter T79.6XXA 958.6   3. Acute UTI, possible N39.0 599.0   4. 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   • Atrial flutter   • Hyponatremia   • Fall   • UTI (urinary tract infection)   • Non-traumatic rhabdomyolysis     Past Medical History:   Diagnosis Date   • H/O degenerative disc disease    • Hypertension    • Osteoporosis    • Skin cancer      Past Surgical History:   Procedure Laterality Date   • BACK SURGERY     • EXTERNAL EAR SURGERY     • REPLACEMENT TOTAL KNEE Left           SWALLOW EVALUATION (last 72 hours)      Swallow Evaluation       03/02/18 1500                Rehab Evaluation    Document Type evaluation  -AW        Subjective Information no complaints;agree to therapy  -AW        Patient Effort, Rehab Treatment good  -AW        Symptoms Noted During/After Treatment none  -AW        General Information    Patient Profile Review yes  -AW        Current Diet Limitations regular solid;thin liquids  -AW        Prior Level of Function- Swallowing no diet consistency restrictions  -AW        Plans/Goals Discussed With patient;family  -AW        Barriers to Rehab none identified  -AW        Clinical Impression    Patient's Goals For Discharge return home  -AW        Rehab Potential/Prognosis, Swallowing good, to achieve stated therapy goals  -AW        Criteria for Skilled Therapeutic Interventions Met no problems identified which require skilled intervention  -AW        FCM, Swallowing 7-->Level 7  -AW        Therapy Frequency evaluation only  -AW        SLP Diet Recommendation regular textures;thin liquids  -AW        Recommended Feeding/Eating Techniques maintain upright posture during/after eating for 30 mins;small sips/bites  -AW        SLP Rec. for Method of Medication Administration meds whole with thin liquid  -AW        Monitor For Signs Of Aspiration cough;gurgly voice;elevated WBC count;throat clearing;fever;upper respiratory infection;pneumonia;right lower lobe infiltrates  -AW        Anticipated Discharge Disposition home with assist  -AW        Cognitive Assessment/Intervention     Current Cognitive/Communication Assessment functional  -AW        Orientation Status oriented x 4  -AW        Follows Commands/Answers Questions 100% of the time  -AW        Personal Safety WNL/WFL  -AW        Personal Safety Interventions fall prevention program maintained  -AW        Oral Motor Structure and Function    Oral Motor Anatomy and Physiology patient demonstrates anatomy that is WNL  -AW        Dentition Assessment upper dentures/partial in place;lower dentures/partial in place  -AW        Secretion Management WNL/WFL  -AW        Mucosal Quality dry  -AW        Volitional Swallow no difficulties initiating volitional swallow  -AW        Volitional Cough no difficulties initiating volitional cough  -AW        Oral Musculature General Assessment oral labial impairment  -AW        Oral Labial Strength and Mobility left labial droop;reduced ROM  -AW          User Key  (r) = Recorded By, (t) = Taken By, (c) = Cosigned By    Initials Name Effective Dates    AW Sofia Almendarez MS [facility]-SLP 04/13/15 -         EDUCATION  The patient has been educated in the following areas:   Dysphagia (Swallowing Impairment) Oral Care/Hydration.    SLP Recommendation and Plan     SLP Diet Recommendation: regular textures, thin liquids  Recommended Feeding/Eating Techniques: maintain upright posture during/after eating for 30 mins, small sips/bites  SLP Rec. for Method of Medication Administration: meds whole with thin liquid  Monitor For Signs Of Aspiration: cough, gurgly voice, elevated WBC count, throat clearing, fever, upper respiratory infection, pneumonia, right lower lobe infiltrates     Criteria for Skilled Therapeutic Interventions Met: no problems identified which require skilled intervention  Anticipated Discharge Disposition: home with assist  Rehab Potential/Prognosis, Swallowing: good, to achieve stated therapy goals  Therapy Frequency: evaluation only             Plan of Care Review  Plan Of Care Reviewed With:  patient  Progress: improving  Outcome Summary/Follow up Plan: Bedside Swallow eval completed. Pt showed no s/s. Recommend pt continue on regular diet. ST to sign off.           SLP Outcome Measures (last 72 hours)      SLP Outcome Measures       03/02/18 1500          SLP Outcome Measures    Outcome Measure Used? Adult NOMS  -AW      FCM Scores    FCM Chosen Swallowing  -AW      Swallowing FCM Score 7  -AW        User Key  (r) = Recorded By, (t) = Taken By, (c) = Cosigned By    Initials Name Effective Dates    GANESH Almendarez MS CCC-SLP 04/13/15 -            Time Calculation:         Time Calculation- SLP       03/02/18 1539          Time Calculation- SLP    SLP Start Time 1345  -AW      SLP Stop Time 1430  -AW      SLP Time Calculation (min) 45 min  -      SLP Received On 03/02/18  -        User Key  (r) = Recorded By, (t) = Taken By, (c) = Cosigned By    Initials Name Provider Type    GANESH lAmendarez MS CCC-SLP Speech and Language Pathologist          Therapy Charges for Today     Code Description Service Date Service Provider Modifiers Qty    [id number]  ST EVAL ORAL PHARYNG SWALLOW 3 3/2/2018 Sofia Almendarez MS CCC-SLP GN 1               Sofia Almendarez MS CCC-SLP  3/2/2018

## 2018-03-02 NOTE — PAYOR COMM NOTE
"UR CONTACT:  MYRTLE                     P: [phone]  F: [phone]        Krzysztof Harrington (88 y.o. Male)     Date of Birth Social Security Number Address Home Phone MRN    [id number]  [id number] [id number] [phone] [id number]    [employer] Marital Status          Roman Catholic        Admission Date Admission Type Admitting Provider Attending Provider Department, Room/Bed    3/1/18 Emergency Fern Hart MD [place], Ky Harden MD [address], 65    Discharge Date Discharge Disposition Discharge Destination                      Attending Provider: Ky English MD     Allergies:  No Known Allergies    Isolation:  None   Infection:  None   Code Status:  FULL    Ht:  182.9 cm (72\")   Wt:  81.6 kg (180 lb)    Admission Cmt:  None   Principal Problem:  None                Active Insurance as of 3/1/2018     Primary Coverage     Payor Plan Insurance Group Employer/Plan Group    AETNA MEDICARE REPLACEMENT AETNA [id number]     Payor Plan Address Payor Plan Phone Number Effective From Effective To    [address] [phone] 2018     [place] [address]       Subscriber Name Subscriber Birth Date Member ID       [id number] HARRINGTON 1929 [facility]                 Emergency Contacts      (Rel.) Home Phone Work Phone Mobile Phone    Krzysztof Harrington Jr (Son) -- [phone]               History & Physical      Fern Hart MD at 3/1/2018  5:19 PM          Name: Krzysztof Harrington ADMIT: 3/1/2018   : 1929  PCP: Willie Ramirez MD    MRN: [id number] LOS: 0 days   AGE/SEX: 88 y.o. male  ROOM: 651/     Chief Complaint   Patient presents with   • Fall     0230 this morning, son found this morning.         History of Present Illness  88-year-old male who was found down by his family today.  The patient reports that he is gotten up at around 2:30 AM to urinate.  He was walking back to his bedroom when his legs " suddenly became weak and he ended up not being able to support himself.  He was using his walker and was able to somewhat gradually lower himself down, but at that point he could not get back up.  He denies loss of consciousness or actual syncope.  He had to lie there until his family found him this morning.  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  He underwent radiation therapy but a follow-up CT showed progression of the disease so he was referred to the [facility] [place].  An MRI was done at [facility] about 2 weeks ago.  He was seen at the multidisciplinary head and neck clinic and plans were for a biopsy to be done and then a determination made on treatment.      Past Medical History:   Diagnosis Date   • H/O degenerative disc disease    • Hypertension    • Osteoporosis    • Skin cancer      Past Surgical History:   Procedure Laterality Date   • BACK SURGERY     • EXTERNAL EAR SURGERY     • REPLACEMENT TOTAL KNEE Left        Allergies:  Review of patient's allergies indicates no known allergies.    Prescriptions Prior to Admission   Medication Sig Dispense Refill Last Dose   • alendronate (FOSAMAX) 70 MG tablet TAKE 1 TABLET EVERY 7 DAYS 12 tablet 3    • baclofen (LIORESAL) 10 MG tablet Take 1 tablet by mouth 3 (Three) Times a Day As Needed for Muscle Spasms. 15 tablet 0    • celecoxib (CeleBREX) 200 MG capsule Take 1 capsule by mouth Daily. 90 capsule 3 Taking   • desonide (DESOWEN) 0.05 % cream Apply topically.   Taking   • famotidine (PEPCID) 20 MG tablet Take by mouth daily.   Taking   • Glucosamine HCl 500 MG tablet Take by mouth.   Taking   • HYDROcodone-acetaminophen (NORCO) 7.5-325 MG per tablet Take 1 tablet by mouth Every 8 (Eight) Hours As Needed for Moderate Pain . 90 tablet 0    • Multiple Vitamin (MULTI-VITAMIN) tablet Take by mouth.   Taking   • mupirocin (BACTROBAN) 2 % ointment Apply sparingly to the biopsy  area 2 times a day  3 Taking   • ramipril (ALTACE) 10 MG capsule TAKE 2 CAPSULES DAILY 180 capsule 0    • senna-docusate (PERICOLACE) 8.6-50 MG per tablet Take by mouth.   Taking   • simvastatin (ZOCOR) 20 MG tablet Take 1 tablet by mouth Every Night. 90 tablet 3 Taking   • valsartan (DIOVAN) 160 MG tablet Take 1 tablet by mouth Daily. 90 tablet 3 Taking       Social History   Substance Use Topics   • Smoking status: Former Smoker   • Smokeless tobacco: Former User      Comment: quit 1962   • Alcohol use Yes      Comment: a beer now and then       History reviewed. No pertinent family history.    Review of Systems   Constitutional: weight stable. Appetite good. No recent fevers or chills.  HEENT: has eye drops for elevated pressure in eyes. Also using lubricant drops. Has constant dry mouth  Respiratory: negative for shortness of breath, PND or CHING. No history of wheezing.  No cough or chest tightness.   Cardiovascular: No chest pain or palpitations.  No problems with edema  Gastrointestinal: No problems with heartburn or indigestion. Bowel movements normal. No blood noted in stools. No melena  Endocrine:  no diabetes   Genitourinary: No difficulty urinating, dysuria or frequency.   Musculoskeletal: some generalized joint pains from OA  Skin: see HPI  Neurological: Negative for headaches.   Hematological:  Does not bruise/bleed easily. No h/o DVTs  Psychiatric/Behavioral: No confusion. The patient is not nervous/anxious.       Objective    Vital Signs  Temp:  [96.1 °F (35.6 °C)-98.6 °F (37 °C)] 97.5 °F (36.4 °C)  Heart Rate:  [70-94] 94  Resp:  [16-20] 20  BP: (172-203)/() 172/66  SpO2:  [93 %-97 %] 94 %  on   ;   O2 Device: room air  Body mass index is 24.82 kg/(m^2).    Physical Exam   Elderly male in NAD  PERRL, EOMI, sclera nonicteric. 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 Oropharynx abnormal with some fullness of the left lateral wall of the faunces although the palate  seems to elevate symmetrically. Neck supple without adenopathy or thyromegaly. No JVD.  Lungs clear with equal breath sounds bilaterally. No wheezes, rales or rhonchi. Breathing nonlabored  Heart RRR without murmur, gallop or rub.   Back no kyphosis  Abdomen soft, nontender, bowel sounds present throughout.  Extremities no cyanosis, clubbing or edema.   Skin warm & dry  Neuro no gross motor deficits, alert & oriented. Speech fluent.       Results Review:   I reviewed the patient's new clinical results.    Lab Results (last 24 hours)     Procedure Component Value Units Date/Time    POC Glucose Once [[id number]]  (Normal) Collected:  03/01/18 1221    Specimen:  Blood Updated:  03/01/18 1223     Glucose 115 mg/dL     Narrative:       Meter: [id number] : [id number] Prem Benites    CBC & Differential [[id number]] Collected:  03/01/18 1243    Specimen:  Blood Updated:  03/01/18 1254    Narrative:       The following orders were created for panel order CBC & Differential.  Procedure                               Abnormality         Status                     ---------                               -----------         ------                     CBC Auto Differential[[id number]]        Abnormal            Final result                 Please view results for these tests on the individual orders.    Comprehensive Metabolic Panel [[id number]]  (Abnormal) Collected:  03/01/18 1243    Specimen:  Blood Updated:  03/01/18 1316     Glucose 116 (H) mg/dL      BUN 16 mg/dL      Creatinine 0.71 (L) mg/dL      Sodium 130 (L) mmol/L      Potassium 4.6 mmol/L      Chloride 91 (L) mmol/L      CO2 27.0 mmol/L      Calcium 10.1 mg/dL      Total Protein 7.8 g/dL      Albumin 3.80 g/dL      ALT (SGPT) 27 U/L      AST (SGOT) 34 U/L      Alkaline Phosphatase 107 U/L      Total Bilirubin 0.9 mg/dL      eGFR Non African Amer 105 mL/min/1.73      Globulin 4.0 gm/dL      A/G Ratio 1.0 g/dL      BUN/Creatinine Ratio 22.5     Anion Gap 12.0  mmol/L     Narrative:       The MDRD GFR formula is only valid for adults with stable renal function between ages 18 and 70.    Troponin [[id number]]  (Normal) Collected:  03/01/18 1243    Specimen:  Blood Updated:  03/01/18 1323     Troponin T <0.010 ng/mL     Narrative:       Troponin T Reference Ranges:  Less than 0.03 ng/mL:    Negative for AMI  0.03 to 0.09 ng/mL:      Indeterminant for AMI  Greater than 0.09 ng/mL: Positive for AMI    Magnesium [[id number]]  (Normal) Collected:  03/01/18 1243    Specimen:  Blood Updated:  03/01/18 1316     Magnesium 1.8 mg/dL     CBC Auto Differential [[id number]]  (Abnormal) Collected:  03/01/18 1243    Specimen:  Blood Updated:  03/01/18 1254     WBC 13.34 (H) 10*3/mm3      RBC 4.54 (L) 10*6/mm3      Hemoglobin 13.9 g/dL      Hematocrit 40.9 %      MCV 90.1 fL      MCH 30.6 pg      MCHC 34.0 g/dL      RDW 12.4 %      RDW-SD 40.7 fl      MPV 10.0 fL      Platelets 387 10*3/mm3      Neutrophil % 85.9 (H) %      Lymphocyte % 7.8 (L) %      Monocyte % 5.8 %      Eosinophil % 0.1 (L) %      Basophil % 0.1 %      Immature Grans % 0.3 %      Neutrophils, Absolute 11.46 (H) 10*3/mm3      Lymphocytes, Absolute 1.04 10*3/mm3      Monocytes, Absolute 0.78 10*3/mm3      Eosinophils, Absolute 0.01 10*3/mm3      Basophils, Absolute 0.01 10*3/mm3      Immature Grans, Absolute 0.04 (H) 10*3/mm3     CK [[id number]]  (Abnormal) Collected:  03/01/18 1243    Specimen:  Blood Updated:  03/01/18 1316     Creatine Kinase 895 (H) U/L     BNP [[id number]]  (Normal) Collected:  03/01/18 1243    Specimen:  Blood Updated:  03/01/18 1441     proBNP 489.1 pg/mL     Narrative:       Among patients with dyspnea, NT-proBNP is highly sensitive for the detection of acute congestive heart failure. In addition NT-proBNP of <300 pg/ml effectively rules out acute congestive heart failure with 99% negative predictive value.    Procalcitonin [[id number]]  (Abnormal) Collected:  03/01/18 1243    Specimen:  Blood Updated:   "03/01/18 1507     Procalcitonin 0.05 (L) ng/mL     Narrative:       As a Marker for Sepsis (Non-Neonates):   1. <0.5 ng/mL represents a low risk of severe sepsis and/or septic shock.  1. >2 ng/mL represents a high risk of severe sepsis and/or septic shock.    As a Marker for Lower Respiratory Tract Infections that require antibiotic therapy:  PCT on Admission     Antibiotic Therapy             6-12 Hrs later  > 0.5                Strongly Recommended            >0.25 - <0.5         Recommended  0.1 - 0.25           Discouraged                   Remeasure/reassess PCT  <0.1                 Strongly Discouraged          Remeasure/reassess PCT      As 28 day mortality risk marker: \"Change in Procalcitonin Result\" (> 80 % or <=80 %) if Day 0 (or Day 1) and Day 4 values are available. Refer to http://www.[employer]pct-calculator.com/   Change in PCT <=80 %   A decrease of PCT levels below or equal to 80 % defines a positive change in PCT test result representing a higher risk for 28-day all-cause mortality of patients diagnosed with severe sepsis or septic shock.  Change in PCT > 80 %   A decrease of PCT levels of more than 80 % defines a negative change in PCT result representing a lower risk for 28-day all-cause mortality of patients diagnosed with severe sepsis or septic shock.                Urinalysis With / Culture If Indicated - Urine, Clean Catch [[id number]]  (Abnormal) Collected:  03/01/18 1248    Specimen:  Urine from Urine, Clean Catch Updated:  03/01/18 1259     Color, UA Yellow     Appearance, UA Cloudy (A)     pH, UA 6.5     Specific Gravity, UA 1.014     Glucose, UA Negative     Ketones, UA 15 mg/dL (1+) (A)     Bilirubin, UA Negative     Blood, UA Trace (A)     Protein, UA Trace (A)     Leuk Esterase, UA Moderate (2+) (A)     Nitrite, UA Positive (A)     Urobilinogen, UA 0.2 E.U./dL    Urinalysis, Microscopic Only - Urine, Clean Catch [[id number]]  (Abnormal) Collected:  03/01/18 1244    Specimen:  Urine from " Urine, Clean Catch Updated:  03/01/18 1259     RBC, UA 0-2 /HPF      WBC, UA Too Numerous to Count (A) /HPF      Bacteria, UA 1+ (A) /HPF      Squamous Epithelial Cells, UA 0-2 /HPF      Hyaline Casts, UA 7-12 /LPF      Methodology Automated Microscopy    Urine Culture - Urine, Urine, Clean Catch [[id number]] Collected:  03/01/18 1244    Specimen:  Urine from Urine, Clean Catch Updated:  03/01/18 1257            Results from last 7 days  Lab Units 03/01/18  1243   WBC 10*3/mm3 13.34*   HEMOGLOBIN g/dL 13.9   PLATELETS 10*3/mm3 387       Results from last 7 days  Lab Units 03/01/18  1243   SODIUM mmol/L 130*   POTASSIUM mmol/L 4.6   CHLORIDE mmol/L 91*   CO2 mmol/L 27.0   BUN mg/dL 16   CREATININE mg/dL 0.71*   GLUCOSE mg/dL 116*   ALBUMIN g/dL 3.80   BILIRUBIN mg/dL 0.9   ALK PHOS U/L 107   AST (SGOT) U/L 34   ALT (SGPT) U/L 27   Estimated Creatinine Clearance: 74.9 mL/min (by C-G formula based on Cr of 0.71).    Results from last 7 days  Lab Units 03/01/18  1243   CK TOTAL U/L 895*   TROPONIN T ng/mL <0.010   PROBNP pg/mL 489.1         Invalid input(s): LDLCALC    Results from last 7 days  Lab Units 03/01/18  1244   NITRITE UA  Positive*   WBC UA /HPF Too Numerous to Count*   BACTERIA UA /HPF 1+*   SQUAM EPITHEL UA /HPF 0-2       CT Head Without Contrast   Preliminary Result       There is no evidence for an acute traumatic abnormality to the head or   maxillofacial region. However, there is a known squamous cell carcinoma   of the left ear and external auditory canal. A portion of the posterior   aspect of the left pinna has been resected. The left external auditory   canal is completely opacified. Also, there is complete opacification of   the left mastoid air cells and middle ear cavity. There is erosion of   the left tegmen tympani and there is a large bony defect along the floor   of the left middle cranial fossa as a result. Furthermore, there is   erosion of the posterior and superior wall of the left  temporal   mandibular joint. A soft tissue density abnormality is appreciated at   the site of the erosion of the tegmen tympani. A fluid density   abnormality is seen just anterior to the left mandibular condyle. The   findings may represent some combination of tumor along with fluid within   the left temporal bone. The possibility of a superimposed infection   could not be excluded. The fluid density anterior to the left   temporomandibular joint once again could be noninfected fluid or   purulent material. Ultimately, I recommend performing a dedicated MRI of   the maxillofacial region as well as an MRI of the brain with and without   contrast to further characterize these abnormalities and to define the   extent of intracranial abnormalities as a consequence of this pathology.       These findings and recommendations were discussed with Dr. Herron on   03/01/2018 at approximately 2:30 PM.       Radiation dose reduction techniques were utilized, including automated   exposure control and exposure modulation based on body size.              CT Facial Bones Without Contrast   Preliminary Result       There is no evidence for an acute traumatic abnormality to the head or   maxillofacial region. However, there is a known squamous cell carcinoma   of the left ear and external auditory canal. A portion of the posterior   aspect of the left pinna has been resected. The left external auditory   canal is completely opacified. Also, there is complete opacification of   the left mastoid air cells and middle ear cavity. There is erosion of   the left tegmen tympani and there is a large bony defect along the floor   of the left middle cranial fossa as a result. Furthermore, there is   erosion of the posterior and superior wall of the left temporal   mandibular joint. A soft tissue density abnormality is appreciated at   the site of the erosion of the tegmen tympani. A fluid density   abnormality is seen just anterior to the  left mandibular condyle. The   findings may represent some combination of tumor along with fluid within   the left temporal bone. The possibility of a superimposed infection   could not be excluded. The fluid density anterior to the left   temporomandibular joint once again could be noninfected fluid or   purulent material. Ultimately, I recommend performing a dedicated MRI of   the maxillofacial region as well as an MRI of the brain with and without   contrast to further characterize these abnormalities and to define the   extent of intracranial abnormalities as a consequence of this pathology.       These findings and recommendations were discussed with Dr. Herron on   03/01/2018 at approximately 2:30 PM.       Radiation dose reduction techniques were utilized, including automated   exposure control and exposure modulation based on body size.              XR Chest 1 View   Final Result   Minimal likely scar atelectasis at the right base. Tortuous   aorta. Follow-up as clinically indicated.       This report was finalized on 3/1/2018 1:27 PM by Dr. Trace Ng MD.            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       Plan:     I have started the patient on subcutaneous Lovenox at full dose because of the atrial flutter.  He is supposed to have a biopsy done and it will be easier to make adjustments with the Lovenox than with other agents.  I asked cardiology to see him although I suspect that they will not recommend any treatment for this at  this time.  His rate is currently controlled.  I have also asked Dr. Núñez to see him.  I didn't know if perhaps the biopsy could be done here to facilitate getting things done so that he can be started on some type of treatment.  I have also requested the MRI report from [facility].  I am going to have PT to see him.  I started him on Rocephin for the presumed UTI.  He does not have any symptoms of dysuria, but this may have contributed some to his falling and being weaker so I have elected to treat it.        Pt was seen prior to midnight on 3/1    Fern Hart MD  [facility]  03/01/18  9:07 PM       Electronically signed by Fern Hart MD at 3/2/2018  3:48 AM           Emergency Department Notes      Ann Birch RN at 3/1/2018 12:32 PM          Family says that left facial drop has been there, however it appears worse, and eye appears reder. states physician has stated they could expect this as with his radiation and facial CA     Ann Birch RN  03/01/18 1233       Electronically signed by Ann Birch RN at 3/1/2018 12:33 PM      Jordan Herron MD at 3/1/2018 12:39 PM           EMERGENCY DEPARTMENT ENCOUNTER    CHIEF COMPLAINT  Chief Complaint: L facial droop  History given by: pt, son  History limited by: none   Room Number: 19/19  PMD: Willie Ramirez MD      HPI:  Pt is a 88 y.o. male who presents complaining of fall last night while ambulating to the restroom at 0230 today, and was not able to get up due to generalized weakness. Per pt's son, he found the pt this AM with L facial droop that appeared was worsened from the past 1.5 weeks. Per the pt's son, the pt's facial droop was not present 2 weeks ago. Pt denies head injury in his fall. Pt states that he was seen here last year for radiation treatment for squamous cell carcinoma, and that he has a bone biopsy scheduled tomorrow at [facility]. Per the pt's son, the pt's radiologist  has discussed with the pt that he may have nerve damage leading to facial droop and poor gait secondary to radiation therapy. Pt denies a Hx A-flutter.    Duration:  Worsened since this AM  Onset: gradual   Timing: constant   Location: L facial  Quality: droop  Intensity/Severity: moderate   Progression: unchanged   Associated Symptoms: generalized weakness   Aggravating Factors: none stated   Alleviating Factors: none stated   Previous Episodes: moderate L facial droop for the past 1.5 weeks   Treatment before arrival: none     PAST MEDICAL HISTORY  Active Ambulatory Problems     Diagnosis Date Noted   • Abnormal serum creatinine level 02/23/2016   • Arthritis 02/23/2016   • Cough 02/23/2016   • Degeneration of intervertebral disc 02/23/2016   • Dysuria 02/23/2016   • Hyperlipidemia 02/23/2016   • Hypertension 02/23/2016   • Low back pain 02/23/2016   • Neuralgia 02/23/2016   • Osteoporosis 02/23/2016   • Persistent cough 06/21/2016   • Chest rales 06/21/2016   • Elevated PSA 03/06/2017   • 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 08/16/2017     Resolved Ambulatory Problems     Diagnosis Date Noted   • No Resolved Ambulatory Problems     Past Medical History:   Diagnosis Date   • Cancer    • H/O degenerative disc disease    • Hypertension    • Osteoporosis    • Skin cancer        PAST SURGICAL HISTORY  Past Surgical History:   Procedure Laterality Date   • BACK SURGERY     • EXTERNAL EAR SURGERY     • REPLACEMENT TOTAL KNEE         FAMILY HISTORY  History reviewed. No pertinent family history.    SOCIAL HISTORY  Social History     Social History   • Marital status:      Spouse name: N/A   • Number of children: N/A   • Years of education: N/A     Occupational History   • Not on file.     Social History Main Topics   • Smoking status: Former Smoker   • Smokeless tobacco: Former User      Comment: quit 1962   • Alcohol use Yes      Comment: a beer now and then   • Drug use: Yes      Special: Hydrocodone   • Sexual activity: No     Other Topics Concern   • Not on file     Social History Narrative       ALLERGIES  Review of patient's allergies indicates no known allergies.    REVIEW OF SYSTEMS  Review of Systems   Constitutional: Negative for activity change, appetite change and fever.   HENT: Negative for congestion and sore throat.    Eyes: Negative.    Respiratory: Negative for cough and shortness of breath.    Cardiovascular: Negative for chest pain and leg swelling.   Gastrointestinal: Negative for abdominal pain, diarrhea and vomiting.   Endocrine: Negative.    Genitourinary: Negative for decreased urine volume and dysuria.   Musculoskeletal: Negative for neck pain.   Skin: Negative for rash and wound.   Allergic/Immunologic: Negative.    Neurological: Positive for facial asymmetry (L droop) and weakness (generalized). Negative for numbness and headaches.   Hematological: Negative.    Psychiatric/Behavioral: Negative.    All other systems reviewed and are negative.      PHYSICAL EXAM  ED Triage Vitals   Temp Heart Rate Resp BP SpO2   03/01/18 1208 03/01/18 1207 03/01/18 1207 03/01/18 1207 03/01/18 1207   96.1 °F (35.6 °C) 92 16 182/106 96 %      Temp src Heart Rate Source Patient Position BP Location FiO2 (%)   03/01/18 1208 -- -- -- --   Oral           Physical Exam   Constitutional: He is oriented to person, place, and time and well-developed, well-nourished, and in no distress.   HENT:   Head: Normocephalic and atraumatic.   Eyes: EOM are normal. Pupils are equal, round, and reactive to light.   Neck: Normal range of motion. Neck supple.   Cardiovascular: Normal rate, regular rhythm and normal heart sounds.    Pulmonary/Chest: Effort normal and breath sounds normal. No respiratory distress.   Abdominal: Soft. There is no tenderness. There is no rebound and no guarding.   Musculoskeletal: Normal range of motion. He exhibits no edema.   Neurological: He is alert and oriented to person,  place, and time. He has normal sensation and normal strength. He displays facial asymmetry (L droop, with L ptosis).   Skin: Skin is warm and dry.   Surgical changes to L ear   Psychiatric: Mood and affect normal.   Nursing note and vitals reviewed.      LAB RESULTS  Lab Results (last 24 hours)     Procedure Component Value Units Date/Time    POC Glucose Once [[id number]]  (Normal) Collected:  03/01/18 1221    Specimen:  Blood Updated:  03/01/18 1223     Glucose 115 mg/dL     Narrative:       Meter: [id number] : [id number] Prem Benites    CBC & Differential [[id number]] Collected:  03/01/18 1243    Specimen:  Blood Updated:  03/01/18 1254    Narrative:       The following orders were created for panel order CBC & Differential.  Procedure                               Abnormality         Status                     ---------                               -----------         ------                     CBC Auto Differential[[id number]]        Abnormal            Final result                 Please view results for these tests on the individual orders.    Comprehensive Metabolic Panel [[id number]]  (Abnormal) Collected:  03/01/18 1243    Specimen:  Blood Updated:  03/01/18 1316     Glucose 116 (H) mg/dL      BUN 16 mg/dL      Creatinine 0.71 (L) mg/dL      Sodium 130 (L) mmol/L      Potassium 4.6 mmol/L      Chloride 91 (L) mmol/L      CO2 27.0 mmol/L      Calcium 10.1 mg/dL      Total Protein 7.8 g/dL      Albumin 3.80 g/dL      ALT (SGPT) 27 U/L      AST (SGOT) 34 U/L      Alkaline Phosphatase 107 U/L      Total Bilirubin 0.9 mg/dL      eGFR Non African Amer 105 mL/min/1.73      Globulin 4.0 gm/dL      A/G Ratio 1.0 g/dL      BUN/Creatinine Ratio 22.5     Anion Gap 12.0 mmol/L     Narrative:       The MDRD GFR formula is only valid for adults with stable renal function between ages 18 and 70.    Troponin [[id number]]  (Normal) Collected:  03/01/18 1243    Specimen:  Blood Updated:  03/01/18 1323     Troponin T  <0.010 ng/mL     Narrative:       Troponin T Reference Ranges:  Less than 0.03 ng/mL:    Negative for AMI  0.03 to 0.09 ng/mL:      Indeterminant for AMI  Greater than 0.09 ng/mL: Positive for AMI    Magnesium [[id number]]  (Normal) Collected:  03/01/18 1243    Specimen:  Blood Updated:  03/01/18 1316     Magnesium 1.8 mg/dL     CBC Auto Differential [[id number]]  (Abnormal) Collected:  03/01/18 1243    Specimen:  Blood Updated:  03/01/18 1254     WBC 13.34 (H) 10*3/mm3      RBC 4.54 (L) 10*6/mm3      Hemoglobin 13.9 g/dL      Hematocrit 40.9 %      MCV 90.1 fL      MCH 30.6 pg      MCHC 34.0 g/dL      RDW 12.4 %      RDW-SD 40.7 fl      MPV 10.0 fL      Platelets 387 10*3/mm3      Neutrophil % 85.9 (H) %      Lymphocyte % 7.8 (L) %      Monocyte % 5.8 %      Eosinophil % 0.1 (L) %      Basophil % 0.1 %      Immature Grans % 0.3 %      Neutrophils, Absolute 11.46 (H) 10*3/mm3      Lymphocytes, Absolute 1.04 10*3/mm3      Monocytes, Absolute 0.78 10*3/mm3      Eosinophils, Absolute 0.01 10*3/mm3      Basophils, Absolute 0.01 10*3/mm3      Immature Grans, Absolute 0.04 (H) 10*3/mm3     CK [[id number]]  (Abnormal) Collected:  03/01/18 1243    Specimen:  Blood Updated:  03/01/18 1316     Creatine Kinase 895 (H) U/L     BNP [[id number]]  (Normal) Collected:  03/01/18 1243    Specimen:  Blood Updated:  03/01/18 1441     proBNP 489.1 pg/mL     Narrative:       Among patients with dyspnea, NT-proBNP is highly sensitive for the detection of acute congestive heart failure. In addition NT-proBNP of <300 pg/ml effectively rules out acute congestive heart failure with 99% negative predictive value.    Procalcitonin [[id number]]  (Abnormal) Collected:  03/01/18 1243    Specimen:  Blood Updated:  03/01/18 1507     Procalcitonin 0.05 (L) ng/mL     Narrative:       As a Marker for Sepsis (Non-Neonates):   1. <0.5 ng/mL represents a low risk of severe sepsis and/or septic shock.  1. >2 ng/mL represents a high risk of severe sepsis and/or  "septic shock.    As a Marker for Lower Respiratory Tract Infections that require antibiotic therapy:  PCT on Admission     Antibiotic Therapy             6-12 Hrs later  > 0.5                Strongly Recommended            >0.25 - <0.5         Recommended  0.1 - 0.25           Discouraged                   Remeasure/reassess PCT  <0.1                 Strongly Discouraged          Remeasure/reassess PCT      As 28 day mortality risk marker: \"Change in Procalcitonin Result\" (> 80 % or <=80 %) if Day 0 (or Day 1) and Day 4 values are available. Refer to http://www.[facility]-pct-calculator.com/   Change in PCT <=80 %   A decrease of PCT levels below or equal to 80 % defines a positive change in PCT test result representing a higher risk for 28-day all-cause mortality of patients diagnosed with severe sepsis or septic shock.  Change in PCT > 80 %   A decrease of PCT levels of more than 80 % defines a negative change in PCT result representing a lower risk for 28-day all-cause mortality of patients diagnosed with severe sepsis or septic shock.                Urinalysis With / Culture If Indicated - Urine, Clean Catch [[id number]]  (Abnormal) Collected:  03/01/18 1244    Specimen:  Urine from Urine, Clean Catch Updated:  03/01/18 1259     Color, UA Yellow     Appearance, UA Cloudy (A)     pH, UA 6.5     Specific Gravity, UA 1.014     Glucose, UA Negative     Ketones, UA 15 mg/dL (1+) (A)     Bilirubin, UA Negative     Blood, UA Trace (A)     Protein, UA Trace (A)     Leuk Esterase, UA Moderate (2+) (A)     Nitrite, UA Positive (A)     Urobilinogen, UA 0.2 E.U./dL    Urinalysis, Microscopic Only - Urine, Clean Catch [[id number]]  (Abnormal) Collected:  03/01/18 1244    Specimen:  Urine from Urine, Clean Catch Updated:  03/01/18 1259     RBC, UA 0-2 /HPF      WBC, UA Too Numerous to Count (A) /HPF      Bacteria, UA 1+ (A) /HPF      Squamous Epithelial Cells, UA 0-2 /HPF      Hyaline Casts, UA 7-12 /LPF      Methodology Automated " Microscopy    Urine Culture - Urine, Urine, Clean Catch [[id number]] Collected:  03/01/18 1244    Specimen:  Urine from Urine, Clean Catch Updated:  03/01/18 1257          I ordered the above labs and reviewed the results    RADIOLOGY  XR Chest 1 View   Final Result   Minimal likely scar atelectasis at the right base. Tortuous   aorta. Follow-up as clinically indicated.       This report was finalized on 3/1/2018 1:27 PM by Dr. Trace Ng MD.          CT Head Without Contrast    (Results Pending)   CT Facial Bones Without Contrast    (Results Pending)    CT head and facial bones: no traumatic injuries, impossible to discern tumor from infection.     I ordered the above noted radiological studies. Interpreted by radiologist. Discussed with radiologist (Dr. Moncada). Reviewed by me in PACS.       PROCEDURES  Procedures  EKG           EKG time: 1229  Rhythm/Rate: A flutter, ventricular rate of 86  P waves and KS: absent   QRS, axis: LAFB    ST and T waves: nonspecific ST T wave changes      Interpreted Contemporaneously by me, independently viewed  changed compared to prior 4/12/13  LAFB is new      PROGRESS AND CONSULTS  ED Course   1256  Ordered CT facial bones and CT head for further evaluation.  1257  Ordered labs for further evaluation.   1423  Ordered labs for further evaluation.    1433  Received a call from Dr. Moncada and discussed pt's case. Dr. Moncada described the pt's CT head with findings of no traumatic injuries, but states that it was impossible to discern infection from tumor, and requested a MRI with and without contrast, and an MRI maxilla with and without. Ordered consult to A.  1503  Received a call from Dr. Hart and discussed pt's case. Dr. Hart agreed to admit the pt.    MEDICAL DECISION MAKING  Results were reviewed/discussed with the patient and they were also made aware of online access. Pt also made aware that some labs, such as cultures, will not be resulted during ER visit and follow up  with PMD is necessary.     MDM  Number of Diagnoses or Management Options  Acute UTI, possible:   Atrial flutter, unspecified type:   Facial nerve palsy:   Traumatic rhabdomyolysis, initial encounter:      Amount and/or Complexity of Data Reviewed  Clinical lab tests: reviewed and ordered (Nitrite U: positive. Bacteria U 1+, Creatinine 0.71, Troponin <0.010, Mg 1.8, WBC 13.34)  Tests in the radiology section of CPT®:  reviewed and ordered (CT head: no traumatic injuries, impossible to discern infection from tumor. CXR: minimal scar atelectasis RLL)  Tests in the medicine section of CPT®:  reviewed and ordered (See procedures section for EKG)  Discussion of test results with the performing providers: yes (Dr. Moncada)  Obtain history from someone other than the patient: yes (Pt's son)  Discuss the patient with other providers: (Dr. Hart)    Patient Progress  Patient progress: stable         DIAGNOSIS  Final diagnoses:   Atrial flutter, unspecified type   Traumatic rhabdomyolysis, initial encounter   Acute UTI, possible   Facial nerve palsy       DISPOSITION  ADMISSION by Dr. Hart    Discussed treatment plan and reason for admission with pt/family and admitting physician.  Pt/family voiced understanding of the plan for admission for further testing/treatment as needed.     Latest Documented Vital Signs:  As of 3:09 PM  BP- (!) 203/96 HR- 82 Temp- 96.1 °F (35.6 °C) (Oral) O2 sat- 96%    --  Documentation assistance provided by nick Thomas for Dr. Herron.  Information recorded by the scribe was done at my direction and has been verified and validated by me.           Oscar Thomas  03/01/18 1508       Jordan Herron MD  03/01/18 1531       Electronically signed by Jordan Herron MD at 3/1/2018  3:31 PM        Lines, Drains & Airways    Active LDAs     Name:   Placement date:   Placement time:   Site:   Days:    Peripheral IV Line - Single Lumen 03/02/18 1130 basilic vein (medial side of arm), right 22 gauge  03/02/18     1130      less than 1         Inactive LDAs     Name:   Placement date:   Placement time:   Removal date:   Removal time:   Site:   Days:    [REMOVED] Peripheral IV Line - Single Lumen 03/01/18 median vein (underside of arm), left 18 gauge  03/01/18 03/02/18    1253      1    [REMOVED] Peripheral IV Line - Single Lumen 03/01/18 1224 median cubital vein (antecubital fossa), right 20 gauge;1 in length  03/01/18    1224    03/02/18    1253      1                Hospital Medications (all)       Dose Frequency Start End    acetaminophen (TYLENOL) tablet 650 mg 650 mg Every 4 Hours PRN 3/1/2018     Sig - Route: Take 2 tablets by mouth Every 4 (Four) Hours As Needed for Mild Pain . - Oral    aluminum-magnesium hydroxide-simethicone (MAALOX MAX) 400-400-40 MG/5ML suspension 15 mL 15 mL Every 6 Hours PRN 3/1/2018     Sig - Route: Take 15 mL by mouth Every 6 (Six) Hours As Needed for Heartburn. - Oral    atorvastatin (LIPITOR) tablet 10 mg 10 mg Daily 3/2/2018     Sig - Route: Take 1 tablet by mouth Daily. - Oral    bisacodyl (DULCOLAX) EC tablet 5 mg 5 mg Daily PRN 3/1/2018     Sig - Route: Take 1 tablet by mouth Daily As Needed for Constipation. - Oral    cefTRIAXone (ROCEPHIN) IVPB 1 g 1 g Every 24 Hours 3/1/2018 3/4/2018    Sig - Route: Infuse 50 mL into a venous catheter Daily. - Intravenous    enoxaparin (LOVENOX) syringe 80 mg 1 mg/kg × 83 kg Every 12 Hours 3/1/2018     Sig - Route: Inject 0.8 mL under the skin Every 12 (Twelve) Hours. - Subcutaneous    famotidine (PEPCID) tablet 20 mg 20 mg Daily 3/2/2018     Sig - Route: Take 1 tablet by mouth Daily. - Oral    hydrALAZINE (APRESOLINE) tablet 25 mg 25 mg Every 8 Hours PRN 3/2/2018     Sig - Route: Take 1 tablet by mouth Every 8 (Eight) Hours As Needed (SBP >180 or DBP >110). - Oral    HYDROcodone-acetaminophen (NORCO) 7.5-325 MG per tablet 1 tablet 1 tablet Every 8 Hours PRN 3/1/2018     Sig - Route: Take 1 tablet by mouth Every 8 (Eight) Hours As Needed for  "Moderate Pain . - Oral    ondansetron (ZOFRAN) injection 4 mg 4 mg Every 6 Hours PRN 3/1/2018     Sig - Route: Infuse 2 mL into a venous catheter Every 6 (Six) Hours As Needed for Nausea or Vomiting. - Intravenous    Linked Group 1:  \"Or\" Linked Group Details        ondansetron (ZOFRAN) tablet 4 mg 4 mg Every 6 Hours PRN 3/1/2018     Sig - Route: Take 1 tablet by mouth Every 6 (Six) Hours As Needed for Nausea or Vomiting. - Oral    Linked Group 1:  \"Or\" Linked Group Details        ondansetron ODT (ZOFRAN-ODT) disintegrating tablet 4 mg 4 mg Every 6 Hours PRN 3/1/2018     Sig - Route: Take 1 tablet by mouth Every 6 (Six) Hours As Needed for Nausea or Vomiting. - Oral    Linked Group 1:  \"Or\" Linked Group Details        perflutren (DEFINITY) 8.476 mg in sodium chloride 0.9 % 10 mL injection 2 mL Once 3/2/2018 3/2/2018    Sig - Route: Infuse 2 mL into a venous catheter 1 (One) Time. - Intravenous    sodium chloride 0.9 % bolus 250 mL 250 mL Once 3/1/2018 3/1/2018    Sig - Route: Infuse 250 mL into a venous catheter 1 (One) Time. - Intravenous    sodium chloride 0.9 % flush 10 mL 10 mL As Needed 3/1/2018     Sig - Route: Infuse 10 mL into a venous catheter As Needed for Line Care. - Intravenous    Cosign for Ordering: Accepted by Jordan Herron MD on 3/1/2018  4:28 PM    sodium chloride 0.9 % infusion 75 mL/hr Continuous 3/1/2018     Sig - Route: Infuse 75 mL/hr into a venous catheter Continuous. - Intravenous    valsartan (DIOVAN) tablet 160 mg 160 mg Once 3/2/2018 3/2/2018    Sig - Route: Take 1 tablet by mouth 1 (One) Time. - Oral    valsartan (DIOVAN) tablet 320 mg 320 mg Daily 3/3/2018     Sig - Route: Take 1 tablet by mouth Daily. - Oral    enoxaparin (LOVENOX) syringe 40 mg (Discontinued) 40 mg Every 24 Hours 3/1/2018 3/1/2018    Sig - Route: Inject 0.4 mL under the skin Daily. - Subcutaneous    ramipril (ALTACE) capsule 10 mg (Discontinued) 10 mg Every 12 Hours Scheduled 3/2/2018 3/2/2018    Sig - Route: Take 1 " capsule by mouth Every 12 (Twelve) Hours. - Oral    sodium chloride 0.9 % infusion (Discontinued) 125 mL/hr Continuous 3/1/2018 3/1/2018    Sig - Route: Infuse 125 mL/hr into a venous catheter Continuous. - Intravenous    valsartan (DIOVAN) tablet 160 mg (Discontinued) 160 mg Daily 3/2/2018 3/2/2018    Sig - Route: Take 1 tablet by mouth Daily. - Oral             Physician Progress Notes       Ky English MD at 3/2/2018 11:51 AM  Version 1 of 1             Name: Krzysztof Ward ADMIT: 3/1/2018   : 1929  PCP: Willie Ramirez MD    MRN: [id number] LOS: 1 days   AGE/SEX: 88 y.o. male  ROOM: [facility]/   Subjective    Still feels week  No soa, cough  No chest pain or palpitations    Subjective    Objective   Vital Signs  Temp:  [96.1 °F (35.6 °C)-98.6 °F (37 °C)] 97.4 °F (36.3 °C)  Heart Rate:  [65-94] 65  Resp:  [16-20] 16  BP: (145-203)/() 180/96  SpO2:  [93 %-97 %] 93 %  on   ;   O2 Device: room air  Body mass index is 24.41 kg/(m^2).    Physical Exam   Constitutional: He is oriented to person, place, and time. No distress.   HENT:   Right Ear: External ear normal.   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   Cardiovascular: Normal rate.  An irregularly irregular rhythm present.   No murmur heard.  Pulmonary/Chest: Effort normal and breath sounds normal. No stridor. No respiratory distress. He has no wheezes.   Abdominal: Soft. Bowel sounds are normal. He exhibits no distension. There is no tenderness. There is no guarding.   Musculoskeletal: He exhibits no edema or tenderness.   Neurological: He is alert and oriented to person, place, and time.   Skin: No rash noted. He is not diaphoretic. No erythema.   Psychiatric: He has a normal mood and affect. His behavior is normal.   Nursing note and vitals reviewed.      Results Review:       I reviewed the patient's new clinical results.    Results from last 7 days  Lab Units 18  0527  03/01/18  1243   WBC 10*3/mm3 8.38 13.34*   HEMOGLOBIN g/dL 10.6* 13.9   PLATELETS 10*3/mm3 321 387     Results from last 7 days  Lab Units 03/02/18  0523 03/01/18  1243   SODIUM mmol/L 132* 130*   POTASSIUM mmol/L 3.4* 4.6   CHLORIDE mmol/L 99 91*   CO2 mmol/L 21.1* 27.0   BUN mg/dL 13 16   CREATININE mg/dL 0.55* 0.71*   GLUCOSE mg/dL 89 116*   Estimated Creatinine Clearance: 73.7 mL/min (by C-G formula based on Cr of 0.55).  Results from last 7 days  Lab Units 03/02/18  0523 03/01/18  1243   CALCIUM mg/dL 7.7* 10.1   ALBUMIN g/dL  --  3.80   MAGNESIUM mg/dL  --  1.8         atorvastatin 10 mg Oral Daily   ceftriaxone 1 g Intravenous Q24H   enoxaparin 1 mg/kg Subcutaneous Q12H   famotidine 20 mg Oral Daily   ramipril 10 mg Oral Q12H   valsartan 160 mg Oral Daily       sodium chloride 75 mL/hr Last Rate: 75 mL/hr (03/02/18 0840)   Diet Regular      Assessment/Plan   Active Hospital Problems (** Indicates Principal Problem)    Diagnosis Date Noted   • Non-traumatic rhabdomyolysis [M62.82] 03/02/2018   • Atrial flutter [I48.92] 03/01/2018   • Hyponatremia [E87.1] 03/01/2018   • Fall [W19.XXXA] 03/01/2018   • UTI (urinary tract infection) [N39.0] 03/01/2018   • 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       Mr. Ward is a 88 y.o. male who has been admitted with fall    · Aflutter: new diagnosis, EKG and echo reviewed.  Rate controlled, on lovenox, Cardiology consulted  · UTI: no symptoms but reasonable to treat x 3 days  · Non-traumatic rhabdomyolysis: CK improving and recheck in am, continue IV fluids  · Hyponatremia improving  · Trying to get MRI report from [facility], outpt follow up with ENT per Dr. Christian  · Hypertension: on both ACE-I and ARB, stop ramipril and increase dose of valsartan    D/W RN    Ky English,  MD [facility]  03/02/18  11:51 AM       Electronically signed by Ky English MD at 3/2/2018 12:00 PM           Physical Therapy Notes       Rebecca Clay PT at 3/2/2018  2:31 PM  Version 1 of 1         Problem: Patient Care Overview (Adult)  Goal: Plan of Care Review  Outcome: Ongoing (interventions implemented as appropriate)   03/02/18 1429   Coping/Psychosocial Response Interventions   Plan Of Care Reviewed With patient   Outcome Evaluation   Outcome Summary/Follow up Plan Pt presents with some impaired functional mobility and gait secondary to generalized weakness, impaired balance, and decreased activitty tolerance s/p UTI. Pt may benefit from skilled PT to address strength, mobility, and functional independence.       Problem: Inpatient Physical Therapy  Goal: Bed Mobility Goal LTG- PT  Outcome: Ongoing (interventions implemented as appropriate)   03/02/18 1429   Bed Mobility PT LTG   Bed Mobility PT LTG, Time to Achieve 1 wk   Bed Mobility PT LTG, Activity Type all bed mobility   Bed Mobility PT LTG, [place] Level supervision required     Goal: Transfer Training Goal 1 LTG- PT  Outcome: Ongoing (interventions implemented as appropriate)   03/02/18 1429   Transfer Training PT LTG   Transfer Training PT LTG, Time to Achieve 1 wk   Transfer Training PT LTG, Activity Type all transfers   Transfer Training PT LTG, [place] Level supervision required   Transfer Training PT LTG, Assist Device walker, rolling     Goal: Gait Training Goal LTG- PT  Outcome: Ongoing (interventions implemented as appropriate)   03/02/18 1429   Gait Training PT LTG   Gait Training Goal PT LTG, Time to Achieve 1 wk   Gait Training Goal PT LTG, [place] Level supervision required   Gait Training Goal PT LTG, Assist Device walker, rolling   Gait Training Goal PT LTG, Distance to Achieve 100            Electronically signed by Rebecca Clay PT at 3/2/2018  2:31 PM

## 2018-03-02 NOTE — PLAN OF CARE
Problem: Patient Care Overview (Adult)  Goal: Plan of Care Review  Outcome: Ongoing (interventions implemented as appropriate)   03/02/18 3926   Coping/Psychosocial Response Interventions   Plan Of Care Reviewed With patient   Patient Care Overview   Progress improving   Outcome Evaluation   Outcome Summary/Follow up Plan Bedside Swallow eval completed. Pt showed no s/s. Recommend pt continue on regular diet. ST to sign off.

## 2018-03-02 NOTE — PLAN OF CARE
Problem: Patient Care Overview (Adult)  Goal: Plan of Care Review  Outcome: Ongoing (interventions implemented as appropriate)   03/02/18 1429   Coping/Psychosocial Response Interventions   Plan Of Care Reviewed With patient   Outcome Evaluation   Outcome Summary/Follow up Plan Pt presents with some impaired functional mobility and gait secondary to generalized weakness, impaired balance, and decreased activitty tolerance s/p UTI. Pt may benefit from skilled PT to address strength, mobility, and functional independence.       Problem: Inpatient Physical Therapy  Goal: Bed Mobility Goal LTG- PT  Outcome: Ongoing (interventions implemented as appropriate)   03/02/18 1429   Bed Mobility PT LTG   Bed Mobility PT LTG, Time to Achieve 1 wk   Bed Mobility PT LTG, Activity Type all bed mobility   Bed Mobility PT LTG, [place] Level supervision required     Goal: Transfer Training Goal 1 LTG- PT  Outcome: Ongoing (interventions implemented as appropriate)   03/02/18 1429   Transfer Training PT LTG   Transfer Training PT LTG, Time to Achieve 1 wk   Transfer Training PT LTG, Activity Type all transfers   Transfer Training PT LTG, [place] Level supervision required   Transfer Training PT LTG, Assist Device walker, rolling     Goal: Gait Training Goal LTG- PT  Outcome: Ongoing (interventions implemented as appropriate)   03/02/18 1429   Gait Training PT LTG   Gait Training Goal PT LTG, Time to Achieve 1 wk   Gait Training Goal PT LTG, [place] Level supervision required   Gait Training Goal PT LTG, Assist Device walker, rolling   Gait Training Goal PT LTG, Distance to Achieve 100

## 2018-03-02 NOTE — DISCHARGE PLACEMENT REQUEST
"Juan Harrington (88 y.o. Male)     Date of Birth Social Security Number Address Home Phone MRN    09/02/1929  [id number] [address] [phone] [id number]    Gnosticist Marital Status          Religious        Admission Date Admission Type Admitting Provider Attending Provider Department, Room/Bed    3/1/18 Emergency Fern Hart MD [place], Ky Harden MD [address], 651/1    Discharge Date Discharge Disposition Discharge Destination                      Attending Provider: Ky English MD     Allergies:  No Known Allergies    Isolation:  None   Infection:  None   Code Status:  FULL    Ht:  182.9 cm (72\")   Wt:  81.6 kg (180 lb)    Admission Cmt:  None   Principal Problem:  None                Active Insurance as of 3/1/2018     Primary Coverage     Payor Plan Insurance Group Employer/Plan Group    [employer] MEDICARE REPLACEMENT A[employer] [id number]     Payor Plan Address Payor Plan Phone Number Effective From Effective To    [address] [phone] 1/1/2018     [place] [address]       Subscriber Name Subscriber Birth Date Member ID       JUAN HARRINGTON 9/2/1929 [facility]                 Emergency Contacts      (Rel.) Home Phone Work Phone Mobile Phone    Juan Harrington Jr (Son) -- [phone]            "

## 2018-03-02 NOTE — PLAN OF CARE
Problem: Patient Care Overview (Adult)  Goal: Plan of Care Review  Outcome: Ongoing (interventions implemented as appropriate)   03/02/18 2943   Coping/Psychosocial Response Interventions   Plan Of Care Reviewed With patient;family   Patient Care Overview   Progress no change   Outcome Evaluation   Outcome Summary/Follow up Plan Pt has a cardio consult with James today. Pt has shown on the monitor Bi and Trigiminies. Aflutter-Afib. Will let cardio know when on the unit. Pt swallows without difficulty pills and food. Facial droop does not prohibit such. A&Ox4. BP can run high. BP meds adjusted. Pain medication was adminstered today for back pain. Will cont to monitor.       Problem: Pain, Chronic (Adult)  Goal: Acceptable Pain Control/Comfort Level  Outcome: Ongoing (interventions implemented as appropriate)      Problem: Fall Risk (Adult)  Goal: Absence of Falls  Outcome: Ongoing (interventions implemented as appropriate)      Problem: Infection, Risk/Actual (Adult)  Goal: Infection Prevention/Resolution  Outcome: Ongoing (interventions implemented as appropriate)

## 2018-03-02 NOTE — PROGRESS NOTES
Pharmacy consult for Lovenox dosing    Krzysztof Ward is a 88 y.o. male 81.6 kg (180 lb).    Pharmacy consulted to dose per Dr. العلي  Indication: VTE prophylaxis    Active Inpatient Anticoagulation/Antiplatelet Orders: Aspirin EC 81mg daily and previously received Enoxaparin 1mg/kg SQ q12h (80mg q12h x 2 doses, last dose 3/2 @ 0521)    Dr. العلي noted in part today:    -Asymptomatic atrial flutter.  Rate controlled.  I do not think he is appropriate for anticoagulant therapy at this time.  He presented with a fall, and he is 88 years of age.  -Move therapeutic Lovenox at prophylactic    Estimated Creatinine Clearance: 73.7 mL/min (by C-G formula based on Cr of 0.55).  Body mass index is 24.41 kg/(m^2).    Creatinine   Date Value Ref Range Status   03/02/2018 0.55 (L) 0.76 - 1.27 mg/dL Final   03/01/2018 0.71 (L) 0.76 - 1.27 mg/dL Final     Platelets   Date Value Ref Range Status   03/02/2018 321 140 - 500 10*3/mm3 Final   03/01/2018 387 140 - 500 10*3/mm3 Final     Hemoglobin   Date Value Ref Range Status   03/02/2018 10.6 (L) 13.7 - 17.6 g/dL Final   03/01/2018 13.9 13.7 - 17.6 g/dL Final     No results found for: INR  PLAN:  Will adjust Lovenox to 40mg SQ q24h starting in AM as esimated CrCl > 40 mL/min at this time.    Daily BMP and CBC already on order    Pharmacy will continue to follow and adjust as needed.      Thanks, Stephon Tellze, PharmD, BCPS

## 2018-03-02 NOTE — THERAPY EVALUATION
Acute Care - Physical Therapy Initial Evaluation  [facility]     Patient Name: Krzysztof Ward  : 1929  MRN: [id number]  Today's Date: 3/2/2018   Onset of Illness/Injury or Date of Surgery Date: 18            Admit Date: 3/1/2018     Visit Dx:    ICD-10-CM ICD-9-CM   1. Atrial flutter, unspecified type I48.92 427.32   2. Traumatic rhabdomyolysis, initial encounter T79.6XXA 958.6   3. Acute UTI, possible N39.0 599.0   4. 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   • Atrial flutter   • Hyponatremia   • Fall   • UTI (urinary tract infection)   • Non-traumatic rhabdomyolysis     Past Medical History:   Diagnosis Date   • H/O degenerative disc disease    • Hypertension    • Osteoporosis    • Skin cancer      Past Surgical History:   Procedure Laterality Date   • BACK SURGERY     • EXTERNAL EAR SURGERY     • REPLACEMENT TOTAL KNEE Left           PT ASSESSMENT (last 72 hours)      PT Evaluation       18 1105 18 1633    Rehab Evaluation    Document Type evaluation  -     Subjective Information agree to therapy  -CH     Patient Effort, Rehab Treatment good  -CH     Symptoms Noted During/After Treatment none  -CH     General Information    Onset of Illness/Injury or Date of Surgery Date 18  -CH     General Observations supine in bed, no acute distress noted at rest, family present  -CH     Pertinent History Of Current Problem pt admitted with fall, weakness, possible UTI and fascial nerve palsey  -     Precautions/Limitations fall precautions  -CH     Prior Level of Function independent:;gait;transfer;bed mobility;ADL's   pt states he started using his walker more recnetly   -     Equipment Currently Used at Home walker, rolling  - walker, rolling  -KB    Plans/Goals Discussed With patient  -     Barriers to Rehab medically complex  -     Living Environment    Lives With  alone  -    Living Arrangements  house  -    Home Accessibility  no concerns  -    Stair Railings at Home  outside, present at both sides  -    Type of Financial/Environmental Concern  none  -    Transportation Available  car;family or friend will provide  -KB    Clinical Impression    Patient/Family Goals Statement to return to [facility]  -     Criteria for Skilled Therapeutic Interventions Met treatment indicated  -     Impairments Found (describe specific impairments) gait, locomotion, and balance;muscle performance  -     Rehab Potential good, to achieve stated therapy goals  -     Pain Assessment    Pain Assessment No/denies pain   pt reports chronic back pain from DDD  -     Cognitive Assessment/Intervention    Current Cognitive/Communication Assessment functional  -     Orientation Status oriented x 4  -     Follows Commands/Answers Questions 100% of the time  -     Personal Safety WNL/WFL  -     Personal Safety Interventions fall prevention program maintained;gait belt;nonskid shoes/slippers when out of bed  -     ROM (Range of Motion)    General ROM no range of motion deficits identified  -     MMT (Manual Muscle Testing)    General MMT Assessment other (see comments)   generalized weakness noted with functional mobility  -     General MMT Assessment Detail --   unilateral or focal differences not noted during MMT  -     Bed Mobility, Assessment/Treatment    Bed Mob, Supine to Sit, [place] verbal cues required;nonverbal cues required (demo/gesture);minimum assist (75% patient effort)  -     Bed Mob, Sit to Supine, [place] verbal cues required;nonverbal cues required (demo/gesture);minimum assist (75% patient effort)  -     Transfer Assessment/Treatment     Transfers, Sit-Stand [place] verbal cues required;nonverbal cues required (demo/gesture);minimum assist (75% patient effort)  -     Transfers, Stand-Sit [place] verbal cues required;nonverbal cues required (demo/gesture);minimum assist (75% patient effort)  -     Transfers, Sit-Stand-Sit, Assist Device rolling walker  -     Gait Assessment/Treatment    Gait, [place] Level verbal cues required;nonverbal cues required (demo/gesture);contact guard assist  -     Gait, Assistive Device rolling walker  -     Gait, Distance (Feet) 30  -     Gait, Gait Deviations giovani decreased;forward flexed posture;step length decreased;stride length decreased  -     Gait, Safety Issues balance decreased during turns;step length decreased  -     Gait, Impairments strength decreased;impaired balance  -     Gait, Comment pt with complaints of weakness with gait  -     Motor Skills/Interventions    Additional Documentation Balance Skills Training (Group)  -     Balance Skills Training    Standing-Level of Assistance Contact guard  -     Static Standing Balance Support assistive device  -     Gait Balance-Level of Assistance Contact guard  -     Gait Balance Support assistive device  -     Positioning and Restraints    Pre-Treatment Position in bed  -     Post Treatment Position bed  -     In Bed supine;call light within reach;encouraged to call for assist;with family/caregiver;exit alarm on  -       User Key  (r) = Recorded By, (t) = Taken By, (c) = Cosigned By    Initials Name Provider Type     Rebecca Clay PT Physical Therapist    ALINA Mckenna, RN Registered Nurse          Physical Therapy Education     Title: PT OT SLP Therapies (Active)     Topic: Physical Therapy (Active)     Point: Mobility training (Done)    Learning Progress Summary    Learner Readiness Method Response Comment Documented by Status   Patient Acceptance E,TB,D NR,RA   03/02/18 1426 Done                Point: Body mechanics (Done)    Learning Progress Summary    Learner Readiness Method Response Comment Documented by Status   Patient Acceptance E,TB,D NR,VU   03/02/18 1428 Done               Point: Precautions (Done)    Learning Progress Summary    Learner Readiness Method Response Comment Documented by Status   Patient Acceptance E,TB,D NR,VU   03/02/18 1428 Done                      User Key     Initials Effective Dates Name Provider Type Discipline     12/01/15 -  Rebecca Clay, PT Physical Therapist PT                PT Recommendation and Plan  Anticipated Discharge Disposition: skilled nursing facility  Planned Therapy Interventions: balance training, bed mobility training, gait training, home exercise program, patient/family education, transfer training  PT Frequency: daily  Plan of Care Review  Plan Of Care Reviewed With: patient  Outcome Summary/Follow up Plan: Pt presents with some impaired functional mobility and gait secondary to generalized weakness, impaired balance, and decreased activitty tolerance s/p UTI. Pt may benefit from skilled PT to address strength, mobility, and functional independence.          IP PT Goals       03/02/18 1429          Bed Mobility PT LTG    Bed Mobility PT LTG, Time to Achieve 1 wk  -CH      Bed Mobility PT LTG, Activity Type all bed mobility  -CH      Bed Mobility PT LTG, [place] Level supervision required  -CH      Transfer Training PT LTG    Transfer Training PT LTG, Time to Achieve 1 wk  -CH      Transfer Training PT LTG, Activity Type all transfers  -CH      Transfer Training PT LTG, [place] Level supervision required  -CH      Transfer Training PT LTG, Assist Device walker, rolling  -CH      Gait Training PT LTG    Gait Training Goal PT LTG, Time to Achieve 1 wk  -CH      Gait Training Goal PT LTG, [place] Level supervision required  -CH      Gait Training Goal PT LTG, Assist Device walker, rolling  -CH      Gait Training Goal PT LTG,  Distance to Achieve 100  -CH        User Key  (r) = Recorded By, (t) = Taken By, (c) = Cosigned By    Initials Name Provider Type     Rebecca Clay PT Physical Therapist                Outcome Measures       03/02/18 1400          How much help from another person do you currently need...    Turning from your back to your side while in flat bed without using bedrails? 3  -CH      Moving from lying on back to sitting on the side of a flat bed without bedrails? 3  -CH      Moving to and from a bed to a chair (including a wheelchair)? 3  -CH      Standing up from a chair using your arms (e.g., wheelchair, bedside chair)? 3  -CH      Climbing 3-5 steps with a railing? 2  -CH      To walk in hospital room? 3  -CH      AM-PAC 6 Clicks Score 17  -CH      Functional Assessment    Outcome Measure Options AM-PAC 6 Clicks Basic Mobility (PT)  -        User Key  (r) = Recorded By, (t) = Taken By, (c) = Cosigned By    Initials Name Provider Type     Rebecca Clay PT Physical Therapist           Time Calculation:         PT Charges       03/02/18 1432          Time Calculation    Start Time 1052  -      Stop Time 1105  -      Time Calculation (min) 13 min  -      PT Received On 03/02/18  -      PT - Next Appointment 03/03/18  -      PT Goal Re-Cert Due Date 03/09/18  -        User Key  (r) = Recorded By, (t) = Taken By, (c) = Cosigned By    Initials Name Provider Type    SOPHIE Clay PT Physical Therapist          Therapy Charges for Today     Code Description Service Date Service Provider Modifiers Qty    78324539929  PT EVAL MOD COMPLEXITY 2 3/2/2018 Rebecca Clay, PT GP 1    [id number] HC PT THER PROC EA 15 MIN 3/2/2018 Rebecca Clay, PT GP 1          PT G-Codes  Outcome Measure Options: AM-PAC 6 Clicks Basic Mobility (PT)      Rebecca Clay PT  3/2/2018

## 2018-03-02 NOTE — PROGRESS NOTES
Name: Krzysztof Ward ADMIT: 3/1/2018   : 1929  PCP: Willie Ramirez MD    MRN: [id number] LOS: 1 days   AGE/SEX: 88 y.o. male  ROOM: [facility]/   Subjective    Still feels week  No soa, cough  No chest pain or palpitations    Subjective    Objective   Vital Signs  Temp:  [96.1 °F (35.6 °C)-98.6 °F (37 °C)] 97.4 °F (36.3 °C)  Heart Rate:  [65-94] 65  Resp:  [16-20] 16  BP: (145-203)/() 180/96  SpO2:  [93 %-97 %] 93 %  on   ;   O2 Device: room air  Body mass index is 24.41 kg/(m^2).    Physical Exam   Constitutional: He is oriented to person, place, and time. No distress.   HENT:   Right Ear: External ear normal.   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   Cardiovascular: Normal rate.  An irregularly irregular rhythm present.   No murmur heard.  Pulmonary/Chest: Effort normal and breath sounds normal. No stridor. No respiratory distress. He has no wheezes.   Abdominal: Soft. Bowel sounds are normal. He exhibits no distension. There is no tenderness. There is no guarding.   Musculoskeletal: He exhibits no edema or tenderness.   Neurological: He is alert and oriented to person, place, and time.   Skin: No rash noted. He is not diaphoretic. No erythema.   Psychiatric: He has a normal mood and affect. His behavior is normal.   Nursing note and vitals reviewed.      Results Review:       I reviewed the patient's new clinical results.    Results from last 7 days  Lab Units 18  0523 18  1243   WBC 10*3/mm3 8.38 13.34*   HEMOGLOBIN g/dL 10.6* 13.9   PLATELETS 10*3/mm3 321 387     Results from last 7 days  Lab Units 18  0523 18  1243   SODIUM mmol/L 132* 130*   POTASSIUM mmol/L 3.4* 4.6   CHLORIDE mmol/L 99 91*   CO2 mmol/L 21.1* 27.0   BUN mg/dL 13 16   CREATININE mg/dL 0.55* 0.71*   GLUCOSE mg/dL 89 116*   Estimated Creatinine Clearance: 73.7 mL/min (by C-G formula based on Cr of 0.55).  Results from last 7 days  Lab Units  03/02/18  0523 03/01/18  1243   CALCIUM mg/dL 7.7* 10.1   ALBUMIN g/dL  --  3.80   MAGNESIUM mg/dL  --  1.8         atorvastatin 10 mg Oral Daily   ceftriaxone 1 g Intravenous Q24H   enoxaparin 1 mg/kg Subcutaneous Q12H   famotidine 20 mg Oral Daily   ramipril 10 mg Oral Q12H   valsartan 160 mg Oral Daily       sodium chloride 75 mL/hr Last Rate: 75 mL/hr (03/02/18 0840)   Diet Regular      Assessment/Plan   Active Hospital Problems (** Indicates Principal Problem)    Diagnosis Date Noted   • Non-traumatic rhabdomyolysis [M62.82] 03/02/2018   • Atrial flutter [I48.92] 03/01/2018   • Hyponatremia [E87.1] 03/01/2018   • Fall [W19.XXXA] 03/01/2018   • UTI (urinary tract infection) [N39.0] 03/01/2018   • 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       Mr. Ward is a 88 y.o. male who has been admitted with fall    · Aflutter: new diagnosis, EKG and echo reviewed.  Rate controlled, on lovenox, Cardiology consulted  · UTI: no symptoms but reasonable to treat x 3 days  · Non-traumatic rhabdomyolysis: CK improving and recheck in am, continue IV fluids  · Hyponatremia improving  · Trying to get MRI report from [facility], outpt follow up with ENT per Dr. Christian  · Hypertension: on both ACE-I and ARB, stop ramipril and increase dose of valsartan    D/W RN    Ky English MD  [facility]  03/02/18  11:51 AM

## 2018-03-02 NOTE — CONSULTS
Patient Name: Krzysztof Ward  Age/Sex: 88 y.o. male  : 1929  MRN: [id number]    Date of Admission: 3/1/2018  Date of Encounter Visit: 18  Encounter Provider: Eddy العلي MD  Place of Service: [facility] CARDIOLOGY      Referring Provider: Fern Hart MD  Patient Care Team:  Willie Ramirez MD as PCP - General (Family Medicine)  Karla Bush MD as Consulting Physician (Radiation Oncology)  Deb Amaro MD as Consulting Physician (Ophthalmology)  Tushar Núñez MD as Consulting Physician (Otolaryngology)    Subjective:     Consulted for: new onset atrial flutter    Chief Complaint: left facial droop     [id number] Score: 3     History of Present Illness:  Krzysztof Ward is a 88 y.o. 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 He presented to the ED yesterday after a fall around 2am. He stated that he was just too weak to walk.  The weakness was severe in intensity.  Continues.  He denied any chest pain or tachycardia prior to his fall.  He is unclear whether he really had a syncopal episode.  He was not found until this morning, at which time he had left facial droop that has worsened over the past week. On arrival his bp was 182/106, HR 92. Labs showed a NA of 130, neg troponin, WBC 13.3 and CK of 895. His urine was positive for nitrites. EKG revealed new onset atrial flutter, rate was 86.    We have been asked to see for his new atrial flutter. He has been started on Lovenox. His rate has been controlled since arrive and BP is much improved this morning.  Echocardiogram as below with normal left ventricular size and systolic function.  No palpitations.  Also documented to have monomorphic PVCs.      Past Medical History:  Past Medical History:   Diagnosis Date   • H/O degenerative  disc disease    • Hypertension    • Osteoporosis    • Skin cancer        Past Surgical History:   Procedure Laterality Date   • BACK SURGERY     • EXTERNAL EAR SURGERY     • REPLACEMENT TOTAL KNEE Left        Home Medications:   Prescriptions Prior to Admission   Medication Sig Dispense Refill Last Dose   • alendronate (FOSAMAX) 70 MG tablet TAKE 1 TABLET EVERY 7 DAYS 12 tablet 3    • famotidine (PEPCID) 20 MG tablet Take by mouth daily.   Taking   • Glucosamine HCl 500 MG tablet Take 500 mg by mouth Daily.   Taking   • HYDROcodone-acetaminophen (NORCO) 7.5-325 MG per tablet Take 1 tablet by mouth Every 8 (Eight) Hours As Needed for Moderate Pain . 90 tablet 0    • Multiple Vitamin (MULTI-VITAMIN) tablet Take 1 tablet by mouth Daily.   Taking   • ramipril (ALTACE) 10 MG capsule TAKE 2 CAPSULES DAILY 180 capsule 0    • senna-docusate (PERICOLACE) 8.6-50 MG per tablet Take 1 tablet by mouth As Needed.   Taking   • simvastatin (ZOCOR) 20 MG tablet Take 1 tablet by mouth Every Night. 90 tablet 3 Taking   • valsartan (DIOVAN) 160 MG tablet Take 1 tablet by mouth Daily. 90 tablet 3 Taking       Allergies:  No Known Allergies    Past Social History:  Social History     Social History   • Marital status:      Spouse name: N/A   • Number of children: N/A   • Years of education: N/A     Occupational History   • Not on file.     Social History Main Topics   • Smoking status: Former Smoker   • Smokeless tobacco: Former User      Comment: quit 1962   • Alcohol use Yes      Comment: a beer now and then   • Drug use: Yes     Special: Hydrocodone   • Sexual activity: No     Other Topics Concern   • Not on file     Social History Narrative        Past Family History:  History reviewed. No pertinent family history.      Review of Systems:  All systems reviewed. Pertinent positives identified in HPI. All other systems are negative.       Objective:     Objective:  Temp:  [96.1 °F (35.6 °C)-98.6 °F (37 °C)] 97.4 °F (36.3  °C)  Heart Rate:  [70-94] 83  Resp:  [16-20] 16  BP: (145-203)/() 160/72    Intake/Output Summary (Last 24 hours) at 03/02/18 1132  Last data filed at 03/02/18 0945   Gross per 24 hour   Intake             3130 ml   Output              725 ml   Net             2405 ml     Body mass index is 24.41 kg/(m^2).  Last 3 weights    03/01/18  1206 03/01/18  1644 03/02/18  0644   Weight: 81.6 kg (180 lb) 83 kg (183 lb) 81.6 kg (180 lb)           Physical Exam:   General Appearance Well developed, cooperative and well nourished and no acute distress   Head Normocephalic, without abnormality, atraumatic   Ears Prior surgical intervention for skin cancer on left    Throat No oral lesions, no thrush, oral mucosa moist   Neck No adenopathy, supple, trachea midline, no thyromegaly, no carotid bruit, no JVD   Back No skin lesions, erythema or scars, no tenderness to percussion or palptaion,range of motion is normal   Lungs Clear to auscultation,respirations regular, even and unlabored   Heart Irregular rhythm and normal rate, normal S1 and S2, no murmur, no gallop, no rub, no click   Chest wall No abnormalities observed   Abdomen Normal bowel sounds, no masses, no hepatomegaly,    Extremities Moves all extremities well, no edema, no cyanosis, no redness   Pulses Pulses palpable and equal bilaterally. Normal radial, carotid, femoral, dorsalis pedis and posterior tibial pulses bilaterally. Normal abdominal aorta   Skin No bleeding, bruising or rash   Psyhiatric Alert and oriented x 3, normal mood and affect       Lab Review:       Results from last 7 days  Lab Units 03/02/18  0523 03/01/18  1243   SODIUM mmol/L 132* 130*   POTASSIUM mmol/L 3.4* 4.6   CHLORIDE mmol/L 99 91*   CO2 mmol/L 21.1* 27.0   BUN mg/dL 13 16   CREATININE mg/dL 0.55* 0.71*   GLUCOSE mg/dL 89 116*   CALCIUM mg/dL 7.7* 10.1         Results from last 7 days  Lab Units 03/02/18  0523 03/01/18  1243   CK TOTAL U/L 321* 895*   TROPONIN T ng/mL  --  <0.010        Results from last 7 days  Lab Units 03/02/18  0523   WBC 10*3/mm3 8.38   HEMOGLOBIN g/dL 10.6*   HEMATOCRIT % 31.8*   PLATELETS 10*3/mm3 321               Results from last 7 days  Lab Units 03/01/18  1243   MAGNESIUM mg/dL 1.8           Results from last 7 days  Lab Units 03/01/18  1243   PROBNP pg/mL 489.1               Imaging:    Imaging Results (most recent)     Procedure Component Value Units Date/Time    XR Chest 1 View [[id number]] Collected:  03/01/18 1325     Updated:  03/01/18 1330    Narrative:       XR CHEST 1 VW-     HISTORY: Male who is 88 years-old,  weakness     TECHNIQUE: Frontal views of the chest     COMPARISON: /12/2013     FINDINGS: Heart size is normal. Aorta is tortuous. Pulmonary vasculature  is unremarkable. Elevation of the right hemidiaphragm is seen. Gas and  stool in the colon under the right hemidiaphragm, with adjacent linear  atelectasis or scar, appearance is grossly similar to the CT from  06/21/2016. No pleural effusion or pneumothorax. No acute osseous  process.       Impression:       Minimal likely scar atelectasis at the right base. Tortuous  aorta. Follow-up as clinically indicated.     This report was finalized on 3/1/2018 1:27 PM by Dr. Trace Ng MD.       CT Head Without Contrast [[id number]] Collected:  03/01/18 1622     Updated:  03/01/18 1622    Narrative:       CT SCAN OF THE HEAD AND MAXILLOFACIAL REGION WITHOUT CONTRAST     CLINICAL HISTORY: Fell last night while ambulating to the restroom at  2:30 in the morning. Left facial droop. 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     TECHNIQUE: CT scan of the head was obtained with 3 mm axial images. No  intravenous contrast was administered. Additionally, a dedicated CT scan  of the maxillofacial region was obtained with 2 mm axial images with  sagittal and coronal reconstructions.     FINDINGS: HEAD CT: There is complete opacification of the left external  auditory canal, the  left mastoid air cells, and the left middle ear  cavity. Tegmen tympani is completely eroded and the inferior aspect of  the left temporal lobe abuts this soft tissue density which has  completely eroded the tegmen tympani. This soft tissue density  furthermore erodes the temporal squamosa and there is a small amount of  abnormal soft tissue density superficial to the temporal squamosa.  Overall, this mass measures up to approximately 3.4 x 2.7 cm in greatest  axial dimensions. Furthermore, the mass erodes the superior aspect of  the left temporomandibular joint. There is extensive fluid density  appreciated along the anterior aspect of the left temporomandibular  joint.     Otherwise, the ventricles, sulci, and cisterns are age-appropriate. The  gray-white matter differentiation is within normal limits.     There is no acute traumatic intracranial pathology. No skull fracture is  noted. There is no evidence for an acute extra-axial hemorrhage.     MAXILLOFACIAL CT: There is a history of a squamous cell carcinoma of the  left external auditory canal and ear. A portion of the left has been  resected posteriorly. There is a large erosive soft tissue mass within  the left temporal bone completely occupying the left external auditory  canal and the mastoid portion of the left temporal bone is completely  opacified as well although this may be due to a combination of tumor and  fluid. There is marked erosion of the tegmen tympani with a large bony  defect along the floor of the left middle cranial fossa. The soft tissue  density abnormality at this site measures up to approximately 3.4 x 2.7  cm in greatest axial dimensions. Furthermore, this mass erodes the  superior and posterior wall of the left temporomandibular joint. There  is fluid density just anterior and inferior to the left mandible condyle  measuring up to approximately 2.4 x 1.4 cm in greatest axial dimensions.  The precise nature of this fluid density as  well as the extensive areas  of opacification of the left mastoid air cells is uncertain.  Superimposed infection involving the left temporal bone cannot be  excluded. Ultimately, I recommend performing an MRI of the head and  maxillofacial region to further characterize these abnormalities.       Impression:          There is no evidence for an acute traumatic abnormality to the head or  maxillofacial region. However, there is a known squamous cell carcinoma  of the left ear and external auditory canal. A portion of the posterior  aspect of the left pinna has been resected. The left external auditory  canal is completely opacified. Also, there is complete opacification of  the left mastoid air cells and middle ear cavity. There is erosion of  the left tegmen tympani and there is a large bony defect along the floor  of the left middle cranial fossa as a result. Furthermore, there is  erosion of the posterior and superior wall of the left temporal  mandibular joint. A soft tissue density abnormality is appreciated at  the site of the erosion of the tegmen tympani. A fluid density  abnormality is seen just anterior to the left mandibular condyle. The  findings may represent some combination of tumor along with fluid within  the left temporal bone. The possibility of a superimposed infection  could not be excluded. The fluid density anterior to the left  temporomandibular joint once again could be noninfected fluid or  purulent material. Ultimately, I recommend performing a dedicated MRI of  the maxillofacial region as well as an MRI of the brain with and without  contrast to further characterize these abnormalities and to define the  extent of intracranial abnormalities as a consequence of this pathology.     These findings and recommendations were discussed with Dr. Herron on  03/01/2018 at approximately 2:30 PM.     Radiation dose reduction techniques were utilized, including automated  exposure control and exposure  modulation based on body size.          CT Facial Bones Without Contrast [[id number]] Collected:  03/01/18 1622     Updated:  03/01/18 1622    Narrative:       CT SCAN OF THE HEAD AND MAXILLOFACIAL REGION WITHOUT CONTRAST     CLINICAL HISTORY: Fell last night while ambulating to the restroom at  2:30 in the morning. Left facial droop. 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     TECHNIQUE: CT scan of the head was obtained with 3 mm axial images. No  intravenous contrast was administered. Additionally, a dedicated CT scan  of the maxillofacial region was obtained with 2 mm axial images with  sagittal and coronal reconstructions.     FINDINGS: HEAD CT: There is complete opacification of the left external  auditory canal, the left mastoid air cells, and the left middle ear  cavity. Tegmen tympani is completely eroded and the inferior aspect of  the left temporal lobe abuts this soft tissue density which has  completely eroded the tegmen tympani. This soft tissue density  furthermore erodes the temporal squamosa and there is a small amount of  abnormal soft tissue density superficial to the temporal squamosa.  Overall, this mass measures up to approximately 3.4 x 2.7 cm in greatest  axial dimensions. Furthermore, the mass erodes the superior aspect of  the left temporomandibular joint. There is extensive fluid density  appreciated along the anterior aspect of the left temporomandibular  joint.     Otherwise, the ventricles, sulci, and cisterns are age-appropriate. The  gray-white matter differentiation is within normal limits.     There is no acute traumatic intracranial pathology. No skull fracture is  noted. There is no evidence for an acute extra-axial hemorrhage.     MAXILLOFACIAL CT: There is a history of a squamous cell carcinoma of the  left external auditory canal and ear. A portion of the left has been  resected posteriorly. There is a large erosive soft tissue mass within  the  left temporal bone completely occupying the left external auditory  canal and the mastoid portion of the left temporal bone is completely  opacified as well although this may be due to a combination of tumor and  fluid. There is marked erosion of the tegmen tympani with a large bony  defect along the floor of the left middle cranial fossa. The soft tissue  density abnormality at this site measures up to approximately 3.4 x 2.7  cm in greatest axial dimensions. Furthermore, this mass erodes the  superior and posterior wall of the left temporomandibular joint. There  is fluid density just anterior and inferior to the left mandible condyle  measuring up to approximately 2.4 x 1.4 cm in greatest axial dimensions.  The precise nature of this fluid density as well as the extensive areas  of opacification of the left mastoid air cells is uncertain.  Superimposed infection involving the left temporal bone cannot be  excluded. Ultimately, I recommend performing an MRI of the head and  maxillofacial region to further characterize these abnormalities.       Impression:          There is no evidence for an acute traumatic abnormality to the head or  maxillofacial region. However, there is a known squamous cell carcinoma  of the left ear and external auditory canal. A portion of the posterior  aspect of the left pinna has been resected. The left external auditory  canal is completely opacified. Also, there is complete opacification of  the left mastoid air cells and middle ear cavity. There is erosion of  the left tegmen tympani and there is a large bony defect along the floor  of the left middle cranial fossa as a result. Furthermore, there is  erosion of the posterior and superior wall of the left temporal  mandibular joint. A soft tissue density abnormality is appreciated at  the site of the erosion of the tegmen tympani. A fluid density  abnormality is seen just anterior to the left mandibular condyle. The  findings may  represent some combination of tumor along with fluid within  the left temporal bone. The possibility of a superimposed infection  could not be excluded. The fluid density anterior to the left  temporomandibular joint once again could be noninfected fluid or  purulent material. Ultimately, I recommend performing a dedicated MRI of  the maxillofacial region as well as an MRI of the brain with and without  contrast to further characterize these abnormalities and to define the  extent of intracranial abnormalities as a consequence of this pathology.     These findings and recommendations were discussed with Dr. Herron on  03/01/2018 at approximately 2:30 PM.     Radiation dose reduction techniques were utilized, including automated  exposure control and exposure modulation based on body size.                Results for orders placed during the hospital encounter of 03/01/18   Adult Transthoracic Echo Complete W/ Cont if Necessary Per Protocol    Narrative · Left ventricular systolic function is normal. Estimated EF = 65%.  · Left ventricular wall thickness is consistent with mild concentric   hypertrophy.  · Left atrial cavity size is mildly dilated.  · There is mild calcification of the aortic valve mainly affecting the   non, left and right coronary cusp(s).  · Mild mitral valve regurgitation is present  · Mild tricuspid valve regurgitation is present.  · Calculated right ventricular systolic pressure from tricuspid   regurgitation is 59.4 mmHg. Severe pulmonary hypertension is present.          EKG:         I personally viewed and interpreted the patient's EKG/Telemetry data.    Transthoracic echocardiogram: Left ventricular ejection fraction 65%.  Mild aortic valve calcification.  Mild mitral valve and tricuspid valve regurgitation.  Moderate pulmonary hypertension on my review.    Assessment:   Assessment/Plan   1.  Paroxysmal atrial flutter  2.  Premature ventricular complexes: Asymptomatic  3.  Essential  hypertension  4.  Hyponatremia  5.  Rhabdomyolysis: Mild  6.  Urinary tract infection: Antibiotics per primary  7.  Moderate pulmonary hypertension: I would not recommend workup considering his age.  No evidence of right heart failure.    Plan:   -Asymptomatic atrial flutter.  Rate controlled.  I do not think he is appropriate for anticoagulant therapy at this time.  He presented with a fall, and he is 88 years of age.  -Move therapeutic Lovenox at prophylactic  -Start low-dose Toprol 25 mg daily  -TSH has been ordered  -I will sign off.  Follow-up with me in 4 weeks.    Thank you for allowing me to participate in the care of Krzysztof Ward. Feel free to contact me directly with any further questions or concerns.    Eddy العلي MD  [facility]  03/02/18  11:32 AM

## 2018-03-03 NOTE — PLAN OF CARE
Problem: Patient Care Overview (Adult)  Goal: Plan of Care Review  Outcome: Ongoing (interventions implemented as appropriate)   03/03/18 0448   Coping/Psychosocial Response Interventions   Plan Of Care Reviewed With patient   Patient Care Overview   Progress no change   Outcome Evaluation   Outcome Summary/Follow up Plan patient alert follows commands, disoriented agt times. mostly at night right after waking up from sleeping. prn pain meds given. iv fluids infusing. no acute distress noted will continue to monitor     Goal: Adult Individualization and Mutuality  Outcome: Ongoing (interventions implemented as appropriate)    Goal: Discharge Needs Assessment  Outcome: Ongoing (interventions implemented as appropriate)      Problem: Pain, Chronic (Adult)  Goal: Acceptable Pain Control/Comfort Level  Outcome: Ongoing (interventions implemented as appropriate)      Problem: Fall Risk (Adult)  Goal: Absence of Falls  Outcome: Ongoing (interventions implemented as appropriate)      Problem: Infection, Risk/Actual (Adult)  Goal: Infection Prevention/Resolution  Outcome: Ongoing (interventions implemented as appropriate)

## 2018-03-03 NOTE — PLAN OF CARE
Problem: Patient Care Overview (Adult)  Goal: Plan of Care Review   03/03/18 1610   Coping/Psychosocial Response Interventions   Plan Of Care Reviewed With patient   Patient Care Overview   Progress progress toward functional goals is gradual   Outcome Evaluation   Outcome Summary/Follow up Plan Pt able to increase ambulation distance this date. 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 Pt stated this is the first time he has been out of bed today, and thats why he thinks he is so stiff. Encouraged pt to ambulate with nursing. Pt will continue to beenfit from skilled PT interventions.

## 2018-03-03 NOTE — PROGRESS NOTES
Name: Krzysztof Ward ADMIT: 3/1/2018   : 1929  PCP: Willie Ramirez MD    MRN: [id number] LOS: 2 days   AGE/SEX: 88 y.o. male  ROOM: [facility]/   Subjective    Still feels week and this is the same  No soa, cough  No chest pain or palpitations  Ate 1/2 of breakfast and has been sleeping since then according to son  Subjective    Objective   Vital Signs  Temp:  [97.3 °F (36.3 °C)-98.3 °F (36.8 °C)] 97.8 °F (36.6 °C)  Heart Rate:  [69-86] 71  Resp:  [16-18] 18  BP: (134-193)/() 185/67  SpO2:  [91 %-96 %] 96 %  on   ;   O2 Device: room air  Body mass index is 24.4 kg/(m^2).    Physical Exam   Constitutional: He is oriented to person, place, and time. No distress.   HENT:   Right Ear: External ear normal.   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   Cardiovascular: Normal rate.  An irregularly irregular rhythm present.   No murmur heard.  Pulmonary/Chest: Effort normal and breath sounds normal. No stridor. No respiratory distress. He has no wheezes.   Abdominal: Soft. Bowel sounds are normal. He exhibits no distension. There is no tenderness. There is no guarding.   Musculoskeletal: He exhibits no edema or tenderness.   Neurological: He is alert and oriented to person, place, and time.   Skin: No rash noted. He is not diaphoretic. No erythema.   Psychiatric: He has a normal mood and affect. His behavior is normal.   Nursing note and vitals reviewed.      Results Review:       I reviewed the patient's new clinical results.    Results from last 7 days  Lab Units 18  0546 18  0523 18  1243   WBC 10*3/mm3 11.25* 8.38 13.34*   HEMOGLOBIN g/dL 11.8* 10.6* 13.9   PLATELETS 10*3/mm3 332 321 387       Results from last 7 days  Lab Units 18  0546 18  0523 18  1243   SODIUM mmol/L 128* 132* 130*   POTASSIUM mmol/L 3.8 3.4* 4.6   CHLORIDE mmol/L 92* 99 91*   CO2 mmol/L 24.8 21.1* 27.0   BUN mg/dL 14 13 16   CREATININE mg/dL  0.54* 0.55* 0.71*   GLUCOSE mg/dL 118* 89 116*   Estimated Creatinine Clearance: 73.7 mL/min (by C-G formula based on Cr of 0.54).    Results from last 7 days  Lab Units 03/03/18  0546 03/02/18  0523 03/01/18  1243   CALCIUM mg/dL 8.4* 7.7* 10.1   ALBUMIN g/dL  --   --  3.80   MAGNESIUM mg/dL  --   --  1.8         aspirin 81 mg Oral Daily   atorvastatin 10 mg Oral Daily   ceftriaxone 1 g Intravenous Q24H   enoxaparin 40 mg Subcutaneous Q24H   famotidine 20 mg Oral Daily   metoprolol succinate XL 25 mg Oral Q24H   valsartan 320 mg Oral Daily       Pharmacy to Dose enoxaparin (LOVENOX)    Diet Regular      Assessment/Plan   Active Hospital Problems (** Indicates Principal Problem)    Diagnosis Date Noted   • Non-traumatic rhabdomyolysis [M62.82] 03/02/2018   • Atrial flutter [I48.92] 03/01/2018   • Hyponatremia [E87.1] 03/01/2018   • Fall [W19.XXXA] 03/01/2018   • UTI (urinary tract infection) [N39.0] 03/01/2018   • 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       Mr. Ward is a 88 y.o. male who has been admitted with fall    · Aflutter: new diagnosis, EKG and echo reviewed.  Rate controlled, on metoprolol, Cardiology consulted and f/u Dr. العلي in 4 wks  · UTI: no symptoms but reasonable to treat x 3 days, GPC  · Non-traumatic rhabdomyolysis: CK improving and not need to recheck, off IV fluids for below  · Hyponatremia worsening, stop IV fluids, check urine studies to guide further treatment.  Recheck BM in am ?SIADH from malignancy--check TSH and am cortisol  · Trying to get MRI report from [facility], outpt follow up with ENT per Dr. Núñez  · Hypertension: was on both ACE-I and ARB, stopped ramipril and increased dose of valsartan--follow closely  · PT consulted    D/W RN    Ky English MD  [facility]  03/03/18  11:44 AM

## 2018-03-03 NOTE — THERAPY TREATMENT NOTE
Acute Care - Physical Therapy Treatment Note  [facility]     Patient Name: Krzysztof Ward  : 1929  MRN: [id number]  Today's Date: 3/3/2018  Onset of Illness/Injury or Date of Surgery Date: 18          Admit Date: 3/1/2018    Visit Dx:    ICD-10-CM ICD-9-CM   1. Atrial flutter, unspecified type I48.92 427.32   2. Traumatic rhabdomyolysis, initial encounter T79.6XXA 958.6   3. Acute UTI, possible N39.0 599.0   4. 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   • Atrial flutter   • Hyponatremia   • Fall   • UTI (urinary tract infection)   • Non-traumatic rhabdomyolysis               Adult Rehabilitation Note       18 1606          Rehab Assessment/Intervention    Discipline physical therapist  -SA      Document Type therapy note (daily note)  -SA      Subjective Information agree to therapy;complains of;pain  -SA      Patient Effort, Rehab Treatment good  -SA      Recorded by [SA] Hetal Medina, PT      Pain Assessment    Pain Assessment 0-10  -SA      Pain Score 5  -SA      Pain Location Back  -SA      Pain Intervention(s) Ambulation/increased activity;Rest  -SA      Response to Interventions tolerated  -SA      Recorded by [SA] Hetal Medina PT      Cognitive Assessment/Intervention    Current Cognitive/Communication Assessment functional  -SA      Orientation Status oriented to;person;place  -SA      Follows Commands/Answers Questions 100% of the time;able to follow single-step instructions  -SA      Personal Safety WNL/WFL  -SA      Personal Safety Interventions fall prevention program maintained;gait belt;nonskid shoes/slippers when out of bed;supervised activity  -SA      Recorded  by [SA] Hetal Medina PT      Bed Mobility, Assessment/Treatment    Bed Mob, Supine to Sit, [place] minimum assist (75% patient effort)  -SA      Recorded by [SA] Hetal Medina PT      Transfer Assessment/Treatment    Transfers, Sit-Stand [place] moderate assist (50% patient effort);2 person assist required  -SA      Transfers, Stand-Sit [place] minimum assist (75% patient effort)  -SA      Transfers, Sit-Stand-Sit, Assist Device rolling walker  -SA      Recorded by [SA] Hetal Medina PT      Gait Assessment/Treatment    Gait, [place] Level contact guard assist  -SA      Gait, Assistive Device rolling walker  -SA      Gait, Distance (Feet) 40  -SA      Gait, Gait Deviations giovani decreased;decreased heel strike;forward flexed posture;step length decreased  -SA      Recorded by [SA] Hetal Medina PT      Therapy Exercises    Bilateral Lower Extremities AROM:;5 reps;sitting;ankle pumps/circles;hip flexion;LAQ  -SA      Recorded by [SA] Hetal Medina PT      Positioning and Restraints    Pre-Treatment Position in bed  -SA      Post Treatment Position chair  -SA      In Chair sitting;call light within reach;encouraged to call for assist;with family/caregiver;notified nsg  -SA      Recorded by [SA] Hetal Medina PT        User Key  (r) = Recorded By, (t) = Taken By, (c) = Cosigned By    Initials Name Effective Dates    SA Hetal Medina PT 08/03/17 -                 IP PT Goals       03/02/18 1429          Bed Mobility PT LTG    Bed Mobility PT LTG, Time to Achieve 1 wk  -CH      Bed Mobility PT LTG, Activity Type all bed mobility  -CH      Bed Mobility PT LTG, [place] Level supervision required  -      Transfer Training PT LTG    Transfer Training PT LTG, Time to Achieve 1 wk  -CH      Transfer Training PT LTG, Activity Type all transfers  -CH      Transfer Training PT LTG, [place] Level supervision required  -CH      Transfer Training PT LTG, Assist Device walker, rolling  -      Gait  Training PT LTG    Gait Training Goal PT LTG, Time to Achieve 1 wk  -CH      Gait Training Goal PT LTG, [place] Level supervision required  -CH      Gait Training Goal PT LTG, Assist Device walker, rolling  -CH      Gait Training Goal PT LTG, Distance to Achieve 100  -CH        User Key  (r) = Recorded By, (t) = Taken By, (c) = Cosigned By    Initials Name Provider Type     Rebecca Clay, PT Physical Therapist          Physical Therapy Education     Title: PT OT SLP Therapies (Done)     Topic: Physical Therapy (Done)     Point: Mobility training (Done)    Learning Progress Summary    Learner Readiness Method Response Comment Documented by Status   Patient Acceptance E VU,NR   03/03/18 1610 Done    Acceptance E,TB,D NR,VU   03/02/18 1428 Done               Point: Home exercise program (Done)    Learning Progress Summary    Learner Readiness Method Response Comment Documented by Status   Patient Acceptance E VU,NR   03/03/18 1610 Done               Point: Body mechanics (Done)    Learning Progress Summary    Learner Readiness Method Response Comment Documented by Status   Patient Acceptance E VU,NR   03/03/18 1610 Done    Acceptance E,TB,D NR,VU   03/02/18 1428 Done               Point: Precautions (Done)    Learning Progress Summary    Learner Readiness Method Response Comment Documented by Status   Patient Acceptance E VU,NR   03/03/18 1610 Done    Acceptance E,TB,D NR,VU   03/02/18 1428 Done                      User Key     Initials Effective Dates Name Provider Type Discipline     12/01/15 -  Rebecca Clay, PT Physical Therapist PT     08/03/17 -  Hetal Medina, PT Physical Therapist PT                    PT Recommendation and Plan  Anticipated Discharge Disposition: skilled nursing facility  Planned Therapy Interventions: balance training, bed mobility training, gait training, home exercise program, patient/family education, transfer training  PT Frequency: daily  Plan of Care  Review  Plan Of Care Reviewed With: patient  Progress: progress toward functional goals is gradual  Outcome Summary/Follow up Plan: Pt able to increase ambulation distance this date. 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 Pt stated this is the first time he has been out of bed today, and thats why he thinks he is so stiff. Encouraged pt to ambulate with nursing. Pt will continue to beenfit from skilled PT interventions.           Outcome Measures       03/03/18 1600 03/02/18 1400       How much help from another person do you currently need...    Turning from your back to your side while in flat bed without using bedrails? 3  -SA 3  -CH     Moving from lying on back to sitting on the side of a flat bed without bedrails? 3  -SA 3  -CH     Moving to and from a bed to a chair (including a wheelchair)? 3  -SA 3  -CH     Standing up from a chair using your arms (e.g., wheelchair, bedside chair)? 2  -SA 3  -CH     Climbing 3-5 steps with a railing? 2  -SA 2  -CH     To walk in hospital room? 3  -SA 3  -CH     AM-PAC 6 Clicks Score 16  -SA 17  -CH     Functional Assessment    Outcome Measure Options AM-PAC 6 Clicks Basic Mobility (PT)  -SA AM-PAC 6 Clicks Basic Mobility (PT)  -       User Key  (r) = Recorded By, (t) = Taken By, (c) = Cosigned By    Initials Name Provider Type     Rebecca Clay, PT Physical Therapist    SA Hetal Medina PT Physical Therapist           Time Calculation:         PT Charges       03/03/18 1612          Time Calculation    Start Time 1549  -      Stop Time 1606  -      Time Calculation (min) 17 min  -SA      PT Received On 03/03/18  -      PT - Next Appointment 03/04/18  -        User Key  (r) = Recorded By, (t) = Taken By, (c) = Cosigned By    Initials Name Provider Type    SA Hetal Medina PT Physical Therapist          Therapy Charges for Today     Code Description Service Date Service  Provider Modifiers Qty    30763208223 HC PT THER PROC EA 15 MIN 3/3/2018 Hetal Medina, PT GP 1    95628097818 HC PT THER SUPP EA 15 MIN 3/3/2018 Hetal Medina PT GP 1          PT G-Codes  Outcome Measure Options: AM-PAC 6 Clicks Basic Mobility (PT)    Hetal Medina PT  3/3/2018

## 2018-03-03 NOTE — PLAN OF CARE
Problem: Patient Care Overview (Adult)  Goal: Plan of Care Review  Outcome: Ongoing (interventions implemented as appropriate)   03/03/18 2608   Coping/Psychosocial Response Interventions   Plan Of Care Reviewed With patient   Patient Care Overview   Progress improving   Outcome Evaluation   Outcome Summary/Follow up Plan Pt able to ambulate some, arthritis in knee controlle with meds, slept in morning, no other complications       Problem: Pain, Chronic (Adult)  Goal: Acceptable Pain Control/Comfort Level  Outcome: Ongoing (interventions implemented as appropriate)      Problem: Fall Risk (Adult)  Goal: Absence of Falls  Outcome: Ongoing (interventions implemented as appropriate)      Problem: Infection, Risk/Actual (Adult)  Goal: Infection Prevention/Resolution  Outcome: Ongoing (interventions implemented as appropriate)

## 2018-03-04 PROBLEM — G06.0 BRAIN ABSCESS: Status: ACTIVE | Noted: 2018-01-01

## 2018-03-04 NOTE — THERAPY TREATMENT NOTE
Acute Care - Physical Therapy Treatment Note  [facility]     Patient Name: Krzysztof Ward  : 1929  MRN: [id number]  Today's Date: 3/4/2018  Onset of Illness/Injury or Date of Surgery Date: 18          Admit Date: 3/1/2018    Visit Dx:    ICD-10-CM ICD-9-CM   1. Atrial flutter, unspecified type I48.92 427.32   2. Traumatic rhabdomyolysis, initial encounter T79.6XXA 958.6   3. Acute UTI, possible N39.0 599.0   4. 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   • Atrial flutter   • Hyponatremia   • Fall   • UTI (urinary tract infection)   • Non-traumatic rhabdomyolysis               Adult Rehabilitation Note       18 1100 18 1606       Rehab Assessment/Intervention    Discipline physical therapist  -LC physical therapist  -SA     Document Type therapy note (daily note)  - therapy note (daily note)  -SA     Subjective Information agree to therapy;complains of;weakness;fatigue;pain  - agree to therapy;complains of;pain  -SA     Patient Effort, Rehab Treatment good  -LC good  -SA     Precautions/Limitations fall precautions  -LC      Recorded by [LC] Gorge Valente, PT DPT [SA] Hetal Medina, PT     Pain Assessment    Pain Assessment 0-10  - 0-10  -SA     Pain Score 5  -LC 5  -SA     Pain Location Back  -LC Back  -SA     Pain Intervention(s) Repositioned  - Ambulation/increased activity;Rest  -SA     Response to Interventions  tolerated  -SA     Recorded by [LC] Gorge Valente, PT DPT [SA] Hetal Medina, PT     Cognitive Assessment/Intervention    Current Cognitive/Communication Assessment functional  -LC functional  -SA     Orientation Status oriented to;person;place;situation   - oriented to;person;place  -     Follows Commands/Answers Questions 100% of the time;able to follow single-step instructions  - 100% of the time;able to follow single-step instructions  -     Personal Safety WNL/WFL  - WNL/WFL  -     Personal Safety Interventions fall prevention program maintained;muscle strengthening facilitated;gait belt;nonskid shoes/slippers when out of bed  - fall prevention program maintained;gait belt;nonskid shoes/slippers when out of bed;supervised activity  -     Recorded by [] Gorge Valente, PT DPT [SA] Hetal Medina PT     Bed Mobility, Assessment/Treatment    Bed Mobility, Assistive Device bed rails;head of bed elevated  -      Bed Mobility, Scoot/Bridge, [place] minimum assist (75% patient effort)  -      Bed Mob, Supine to Sit, [place] minimum assist (75% patient effort)  - minimum assist (75% patient effort)  -     Bed Mobility, Impairments strength decreased;impaired balance  -      Recorded by [] Gorge Valente, PT DPT [SA] Hetal Medina PT     Transfer Assessment/Treatment    Transfers, Sit-Stand [place] moderate assist (50% patient effort)  - moderate assist (50% patient effort);2 person assist required  -     Transfers, Stand-Sit [place] minimum assist (75% patient effort)  - minimum assist (75% patient effort)  -     Transfers, Sit-Stand-Sit, Assist Device rolling walker  - rolling walker  -     Transfer, Impairments strength decreased;impaired balance  -      Recorded by [LC] Gorge Valente, PT DPT [SA] Hetal Medina PT     Gait Assessment/Treatment    Gait, [place] Level contact guard assist  - contact guard assist  -     Gait, Assistive Device rolling walker  - rolling walker  -     Gait, Distance (Feet) 40  - 40  -SA     Gait, Gait Deviations narrow base;step length decreased;forward flexed posture  - giovani decreased;decreased heel strike;forward flexed posture;step length decreased  -      Gait, Safety Issues weight-shifting ability decreased  -      Gait, Impairments strength decreased;impaired balance  -      Recorded by [LC] Gorge Valente, PT DPT [SA] Hetal Medina PT     Therapy Exercises    Bilateral Lower Extremities  AROM:;5 reps;sitting;ankle pumps/circles;hip flexion;LAQ  -SA     Recorded by  [SA] Hetal Medina PT     Positioning and Restraints    Pre-Treatment Position in bed  -LC in bed  -SA     Post Treatment Position bed  - chair  -SA     In Bed fowlers;call light within reach;encouraged to call for assist;exit alarm on;with family/caregiver;side rails up x2  -      In Chair  sitting;call light within reach;encouraged to call for assist;with family/caregiver;notified nsg  -SA     Recorded by [LC] Gorge Valente, PT DPT [SA] Hetal Medina PT       User Key  (r) = Recorded By, (t) = Taken By, (c) = Cosigned By    Initials Name Effective Dates     Gorge Valente, PT DPT 08/02/16 -     SA Hetal Medina PT 08/03/17 -                 IP PT Goals       03/02/18 1429          Bed Mobility PT LTG    Bed Mobility PT LTG, Time to Achieve 1 wk  -CH      Bed Mobility PT LTG, Activity Type all bed mobility  -CH      Bed Mobility PT LTG, [place] Level supervision required  -CH      Transfer Training PT LTG    Transfer Training PT LTG, Time to Achieve 1 wk  -CH      Transfer Training PT LTG, Activity Type all transfers  -CH      Transfer Training PT LTG, [place] Level supervision required  -CH      Transfer Training PT LTG, Assist Device walker, rolling  -CH      Gait Training PT LTG    Gait Training Goal PT LTG, Time to Achieve 1 wk  -CH      Gait Training Goal PT LTG, [place] Level supervision required  -CH      Gait Training Goal PT LTG, Assist Device walker, rolling  -CH      Gait Training Goal PT LTG, Distance to Achieve 100  -CH        User Key  (r) = Recorded By, (t) = Taken By, (c) = Cosigned By    Initials Name Provider Type    SOPHIE Clay, PT Physical  Therapist          Physical Therapy Education     Title: PT OT SLP Therapies (Done)     Topic: Physical Therapy (Done)     Point: Mobility training (Done)    Learning Progress Summary    Learner Readiness Method Response Comment Documented by Status   Patient Acceptance E,D VU,DU,NR safety during ambulation  03/04/18 1128 Done    Acceptance E VU,NR   03/03/18 1610 Done    Acceptance E,TB,D NR,VU   03/02/18 1428 Done               Point: Home exercise program (Done)    Learning Progress Summary    Learner Readiness Method Response Comment Documented by Status   Patient Acceptance E VU,NR   03/03/18 1610 Done               Point: Body mechanics (Done)    Learning Progress Summary    Learner Readiness Method Response Comment Documented by Status   Patient Acceptance E VU,NR   03/03/18 1610 Done    Acceptance E,TB,D NR,VU   03/02/18 1428 Done               Point: Precautions (Done)    Learning Progress Summary    Learner Readiness Method Response Comment Documented by Status   Patient Acceptance E VU,NR   03/03/18 1610 Done    Acceptance E,TB,D NR,VU   03/02/18 1428 Done                      User Key     Initials Effective Dates Name Provider Type Discipline     12/01/15 -  Rebecca Clay, PT Physical Therapist PT     08/02/16 -  Gorge Valente, PT DPT Physical Therapist PT     08/03/17 -  Hetal Medina PT Physical Therapist PT                    PT Recommendation and Plan  Anticipated Discharge Disposition: skilled nursing facility  Planned Therapy Interventions: balance training, bed mobility training, gait training, home exercise program, patient/family education, transfer training  PT Frequency: daily  Plan of Care Review  Plan Of Care Reviewed With: patient, family  Progress: progress toward functional goals is gradual  Outcome Summary/Follow up Plan: Pt transferred supine to sit with min x 1 and use of bed rail. Pt performed sit to stand with mod x 1 and RW. He ambulated 40 ft with cGA x 1  and RW. Pt returned to sit EOB and transferred sit to supine with mod x 1.          Outcome Measures       03/04/18 1100 03/03/18 1600 03/02/18 1400    How much help from another person do you currently need...    Turning from your back to your side while in flat bed without using bedrails? 3  -LC 3  -SA 3  -CH    Moving from lying on back to sitting on the side of a flat bed without bedrails? 3  -LC 3  -SA 3  -CH    Moving to and from a bed to a chair (including a wheelchair)? 3  -LC 3  -SA 3  -CH    Standing up from a chair using your arms (e.g., wheelchair, bedside chair)? 2  -LC 2  -SA 3  -CH    Climbing 3-5 steps with a railing? 2  -LC 2  -SA 2  -CH    To walk in hospital room? 3  -LC 3  -SA 3  -CH    AM-PAC 6 Clicks Score 16  -LC 16  -SA 17  -CH    Functional Assessment    Outcome Measure Options AM-PAC 6 Clicks Basic Mobility (PT)  - AM-PAC 6 Clicks Basic Mobility (PT)  -SA AM-PAC 6 Clicks Basic Mobility (PT)  -      User Key  (r) = Recorded By, (t) = Taken By, (c) = Cosigned By    Initials Name Provider Type     Rebecca Clay, PT Physical Therapist    MP Valente, PT DPT Physical Therapist    SA Hetal Medina, PT Physical Therapist           Time Calculation:         PT Charges       03/04/18 1130          Time Calculation    Start Time 1115  -      Stop Time 1130  -      Time Calculation (min) 15 min  -      PT Received On 03/04/18  -      PT - Next Appointment 03/05/18  -      Time Calculation- PT    Total Timed Code Minutes- PT 15 minute(s)  -        User Key  (r) = Recorded By, (t) = Taken By, (c) = Cosigned By    Initials Name Provider Type    MP Valente, PT DPT Physical Therapist          Therapy Charges for Today     Code Description Service Date Service Provider Modifiers Qty    60984126423 HC GAIT TRAINING EA 15 MIN 3/4/2018 Gorge Valente, PT DPT GP 1          PT G-Codes  Outcome Measure Options: AM-PAC 6 Clicks Basic Mobility (PT)    Gorge Valente, PT  DPT  3/4/2018

## 2018-03-04 NOTE — PLAN OF CARE
Problem: Patient Care Overview (Adult)  Goal: Plan of Care Review  Outcome: Ongoing (interventions implemented as appropriate)   03/04/18 0441   Coping/Psychosocial Response Interventions   Plan Of Care Reviewed With patient   Patient Care Overview   Progress no change   Outcome Evaluation   Outcome Summary/Follow up Plan patient alert follows commands, disoriented when first waking up, able to easily reorient. prn pain med given. no acute distress noted will continue to monitor     Goal: Adult Individualization and Mutuality  Outcome: Ongoing (interventions implemented as appropriate)    Goal: Discharge Needs Assessment  Outcome: Ongoing (interventions implemented as appropriate)      Problem: Pain, Chronic (Adult)  Goal: Acceptable Pain Control/Comfort Level  Outcome: Ongoing (interventions implemented as appropriate)      Problem: Fall Risk (Adult)  Goal: Absence of Falls  Outcome: Ongoing (interventions implemented as appropriate)      Problem: Infection, Risk/Actual (Adult)  Goal: Infection Prevention/Resolution  Outcome: Ongoing (interventions implemented as appropriate)

## 2018-03-04 NOTE — CONSULTS
Referring Provider: Dr. Dereck English  Reason for Consultation: hyponatremia    Subjective     Chief complaint   Chief Complaint   Patient presents with   • Fall     0230 this morning, son found this morning.        History of present illness:  89 yo WM with no prior knowledge of hypoNa in past, admitted after falling at home and being too weak to get back up.  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  Asked to see d/t worsening hypoNa while here, with adm sNa 130 and further fall to 128 yesterday and 124 today.  Hosp course:  new arrhythmia (atrial flutter) and start of AC; marked facial droop on left over past one week; very dry mouth with heightened water intake; decline in appetite; weakness; and slight decline in mental acuity (tho family states that he is much better today than yesterday).  No diuretics at home; BP's stable.    Past Medical History:   Diagnosis Date   • H/O degenerative disc disease    • Hypertension    • Osteoporosis    • Skin cancer      Past Surgical History:   Procedure Laterality Date   • BACK SURGERY     • EXTERNAL EAR SURGERY     • REPLACEMENT TOTAL KNEE Left      History reviewed. No pertinent family history.  Social History   Substance Use Topics   • Smoking status: Former Smoker   • Smokeless tobacco: Former User      Comment: quit 1962   • Alcohol use Yes      Comment: a beer now and then     Prescriptions Prior to Admission   Medication Sig Dispense Refill Last Dose   • alendronate (FOSAMAX) 70 MG tablet TAKE 1 TABLET EVERY 7 DAYS 12 tablet 3    • famotidine (PEPCID) 20 MG tablet Take by mouth daily.   Taking   • Glucosamine HCl 500 MG tablet Take 500 mg by mouth Daily.   Taking   • HYDROcodone-acetaminophen (NORCO) 7.5-325 MG per tablet Take 1 tablet by mouth Every 8 (Eight) Hours As Needed for Moderate Pain . 90 tablet 0    • Multiple Vitamin (MULTI-VITAMIN) tablet Take 1 tablet by mouth  "Daily.   Taking   • ramipril (ALTACE) 10 MG capsule TAKE 2 CAPSULES DAILY 180 capsule 0    • senna-docusate (PERICOLACE) 8.6-50 MG per tablet Take 1 tablet by mouth As Needed.   Taking   • simvastatin (ZOCOR) 20 MG tablet Take 1 tablet by mouth Every Night. 90 tablet 3 Taking   • valsartan (DIOVAN) 160 MG tablet Take 1 tablet by mouth Daily. 90 tablet 3 Taking     Allergies:  Review of patient's allergies indicates no known allergies.    Review of Systems  14-point ROS performed and all negative except for pertinent +/-'s detailed in HPI.     Objective     Vital Signs  Temp:  [97.7 °F (36.5 °C)-99.1 °F (37.3 °C)] 98 °F (36.7 °C)  Heart Rate:  [56-88] 68  Resp:  [18] 18  BP: (125-194)/(59-81) 164/76    Flowsheet Rows         First Filed Value    Admission Height  177.8 cm (70\") Documented at 03/01/2018 1206    Admission Weight  81.6 kg (180 lb) Documented at 03/01/2018 1206           I/O this shift:  In: 450 [P.O.:450]  Out: 375 [Urine:375]  I/O last 3 completed shifts:  In: 360 [P.O.:360]  Out: 1025 [Urine:1025]    Intake/Output Summary (Last 24 hours) at 03/04/18 1219  Last data filed at 03/04/18 1128   Gross per 24 hour   Intake              810 ml   Output              575 ml   Net              235 ml       Physical Exam:  NAD; pleasant; not oriented to month or year (\"April 1918\"); looks stated age  Marked facial droop on left; everted left lower lid with injected sclera  Partially removed pinna on left  Dry MM; no scleral icterus  No JVD; no carotid bruits  CTA bilat; not labored  RRR, no rub  Soft, NT, ND, BS+  No significant edema  Many AK's legs  No clubbing  No asterixis  Moves all extremities   Speech fluent, but he repeats elements of history many times; cognitive blunting  Mood is good; affect normal    Results Review:    Results from last 7 days  Lab Units 03/04/18  0546 03/03/18  0546 03/02/18  0523 03/01/18  1243   SODIUM mmol/L 124* 128* 132* 130*   POTASSIUM mmol/L 3.3* 3.8 3.4* 4.6   CHLORIDE " mmol/L 88* 92* 99 91*   CO2 mmol/L 25.4 24.8 21.1* 27.0   BUN mg/dL 14 14 13 16   CREATININE mg/dL 0.58* 0.54* 0.55* 0.71*   CALCIUM mg/dL 8.5* 8.4* 7.7* 10.1   BILIRUBIN mg/dL  --   --   --  0.9   ALK PHOS U/L  --   --   --  107   ALT (SGPT) U/L  --   --   --  27   AST (SGOT) U/L  --   --   --  34   GLUCOSE mg/dL 106* 118* 89 116*       Estimated Creatinine Clearance: 74.1 mL/min (by C-G formula based on Cr of 0.58).      Results from last 7 days  Lab Units 03/01/18  1243   MAGNESIUM mg/dL 1.8         Results from last 7 days  Lab Units 03/04/18  0546 03/03/18  0546 03/02/18  0523 03/01/18  1243   WBC 10*3/mm3 10.76* 11.25* 8.38 13.34*   HEMOGLOBIN g/dL 11.2* 11.8* 10.6* 13.9   PLATELETS 10*3/mm3 322 332 321 387             Active Medications    aspirin 81 mg Oral Daily   atorvastatin 10 mg Oral Daily   enoxaparin 40 mg Subcutaneous Q24H   famotidine 20 mg Oral Daily   metoprolol succinate XL 25 mg Oral Q24H   valsartan 320 mg Oral Daily       Pharmacy to Dose enoxaparin (LOVENOX)        Assessment/Plan   Assessment  1.  Hyponatremia, acute on possibly chronic, and seemingly with euvolemia: probably multifactorial with poor solute intake, increased water intake, and possibly malignancy-driven paraneoplastic process.  Urine studies c/w SIADH.  TSH and cortisol fine  2.  SCC left ear and auditory canal with recurrence and suspected invasion  3.  Left facial droop  4.  Atrial flutter  5.  Anemia  6.  Leukocytosis  7.  Hypokalemia  8.  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  Orthostatics to ensure he is vol replete  2.  Potassium protocol  3.  Uric acid  4.  Ensure to augment nutrition  5.  Surveillance labs, with re-check of serum Na later today    I discussed the patient's findings and  my recommendations with patient and family at bedside    Cecil White MD  03/04/18  12:19 PM

## 2018-03-04 NOTE — PROGRESS NOTES
Name: Krzysztof Ward ADMIT: 3/1/2018   : 1929  PCP: Willie Ramirez MD    MRN: [id number] LOS: 3 days   AGE/SEX: 88 y.o. male  ROOM: [facility]/   Subjective    Still feels week and this is the same since admission  No soa, cough  No chest pain or palpitations  He is more awake today  No fevers or chills  Subjective    Objective   Vital Signs  Temp:  [97.7 °F (36.5 °C)-99.1 °F (37.3 °C)] 98 °F (36.7 °C)  Heart Rate:  [56-88] 68  Resp:  [18] 18  BP: (125-194)/(59-81) 164/76  SpO2:  [92 %-97 %] 92 %  on   ;   O2 Device: room air  Body mass index is 24.55 kg/(m^2).    Physical Exam   Constitutional: He is oriented to person, place, and time. No distress.   HENT:   Right Ear: External ear normal.   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   Cardiovascular: Normal rate.  An irregularly irregular rhythm present.   No murmur heard.  Pulmonary/Chest: Effort normal and breath sounds normal. No stridor. No respiratory distress. He has no wheezes.   Abdominal: Soft. Bowel sounds are normal. He exhibits no distension. There is no tenderness. There is no guarding.   Musculoskeletal: He exhibits no edema or tenderness.   Neurological: He is alert and oriented to person, place, and time. A cranial nerve deficit is present.   Left facial droop and ptosis  Right UE and LE 4/5 strength and left UE and LE both 4/5 but seem stronger than the right     Skin: No rash noted. He is not diaphoretic. No erythema.   Psychiatric: He has a normal mood and affect. His behavior is normal.   Nursing note and vitals reviewed.      Results Review:       I reviewed the patient's new clinical results.    Results from last 7 days  Lab Units 18  0546 18  0546 18  0523 18  1243   WBC 10*3/mm3 10.76* 11.25* 8.38 13.34*   HEMOGLOBIN g/dL 11.2* 11.8* 10.6* 13.9   PLATELETS 10*3/mm3 322 332 321 387       Results from last 7 days  Lab Units 18  0546 18  0546  03/02/18  0523 03/01/18  1243   SODIUM mmol/L 124* 128* 132* 130*   POTASSIUM mmol/L 3.3* 3.8 3.4* 4.6   CHLORIDE mmol/L 88* 92* 99 91*   CO2 mmol/L 25.4 24.8 21.1* 27.0   BUN mg/dL 14 14 13 16   CREATININE mg/dL 0.58* 0.54* 0.55* 0.71*   GLUCOSE mg/dL 106* 118* 89 116*   Estimated Creatinine Clearance: 74.1 mL/min (by C-G formula based on Cr of 0.58).    Results from last 7 days  Lab Units 03/04/18  0546 03/03/18  0546 03/02/18  0523 03/01/18  1243   CALCIUM mg/dL 8.5* 8.4* 7.7* 10.1   ALBUMIN g/dL  --   --   --  3.80   MAGNESIUM mg/dL  --   --   --  1.8         aspirin 81 mg Oral Daily   atorvastatin 10 mg Oral Daily   enoxaparin 40 mg Subcutaneous Q24H   famotidine 20 mg Oral Daily   metoprolol succinate XL 25 mg Oral Q24H   valsartan 320 mg Oral Daily       Pharmacy to Dose enoxaparin (LOVENOX)    Diet Regular; Daily Fluid Restriction; 1500 mL Fluid      Assessment/Plan   Active Hospital Problems (** Indicates Principal Problem)    Diagnosis Date Noted   • Non-traumatic rhabdomyolysis [M62.82] 03/02/2018   • Atrial flutter [I48.92] 03/01/2018   • Hyponatremia [E87.1] 03/01/2018   • Fall [W19.XXXA] 03/01/2018   • UTI (urinary tract infection) [N39.0] 03/01/2018   • 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       Mr. Ward is a 88 y.o. male who has been admitted with fall    · Continued weakness.  Per son worsening chronic facial droop (which could be from cancer pressing on facial nerve) but on exam right side weaker and has new dx aflutter.  Will get MRI brain today   · Aflutter: new diagnosis, EKG and echo reviewed.  Rate controlled, on metoprolol, Cardiology consulted and f/u Dr. العلي in 4 wks  · UTI: no symptoms but reasonable to treat x 3 days, GPC  · Non-traumatic rhabdomyolysis: CK improving and not need to recheck, off  IV fluids for below  · Hyponatremia worsening again off IV fluids,  urine studies suggest SIADH  ? from malignancy-- TSH and am cortisol normal.  Fluid restrict and consult nephrology  · Outpt follow up with ENT per Dr. Núñez  · Hypertension: was on both ACE-I and ARB, stopped ramipril and increased dose of valsartan--follow closely  · PT consulted    D/W Dr. White    ADDENDUM 2000: Received msg that MRI showing left temporal abscess/empyema with moderate edema.  I've started vanc and zosyn, consulted Neurosurgery and ID, placed on keppra for risk of seizures.  I'm hesitant to place on decadron given the infection and will defer this to neurosurgery.  I have just talked to his nurse to give her update and to call neurosurgery on call now for consult to let them decide course and timing of interventions if needed. I'm reconsulting ENT since there is mandibular bony destruction too    Ky English MD  [facility]  03/04/18  7:52 AM

## 2018-03-04 NOTE — PLAN OF CARE
Problem: Patient Care Overview (Adult)  Goal: Plan of Care Review  Outcome: Ongoing (interventions implemented as appropriate)   03/04/18 1129   Coping/Psychosocial Response Interventions   Plan Of Care Reviewed With patient;family   Patient Care Overview   Progress progress toward functional goals is gradual   Outcome Evaluation   Outcome Summary/Follow up Plan Pt transferred supine to sit with min x 1 and use of bed rail. Pt performed sit to stand with mod x 1 and RW. He ambulated 40 ft with cGA x 1 and RW. Pt returned to sit EOB and transferred sit to supine with mod x 1.

## 2018-03-05 PROBLEM — G51.0 FACIAL PARALYSIS ON LEFT SIDE: Status: ACTIVE | Noted: 2018-01-01

## 2018-03-05 NOTE — PROGRESS NOTES
Name: Krzysztof Ward ADMIT: 3/1/2018   : 1929  PCP: Willie Ramirez MD    MRN: [id number] LOS: 4 days   AGE/SEX: 88 y.o. male  ROOM: [facility]/   Subjective    Still feels weak and this is the same since admission  No soa, cough  No chest pain or palpitations  He is more awake today  Had fever last night  Mri abnormal  Subjective    Objective   Vital Signs  Temp:  [97.4 °F (36.3 °C)-101.1 °F (38.4 °C)] 97.5 °F (36.4 °C)  Heart Rate:  [53-79] 79  Resp:  [16-18] 18  BP: (120-192)/(65-93) 161/67  SpO2:  [94 %-99 %] 99 %  on   ;   O2 Device: room air  Body mass index is 24.09 kg/(m^2).    Physical Exam   Constitutional: He is oriented to person, place, and time. No distress.   HENT:   Right Ear: External ear normal.   Left ear deformity from SCC, left eye ptosis and facial droop related to SCC   Neck: No JVD present.   Cardiovascular: Normal rate.  An irregularly irregular rhythm present.   No murmur heard.  Pulmonary/Chest: Effort normal and breath sounds normal. No stridor. No respiratory distress. He has no wheezes.   Abdominal: Soft. Bowel sounds are normal. He exhibits no distension. There is no tenderness. There is no guarding.   Musculoskeletal: He exhibits no edema or tenderness.   Neurological: He is alert and oriented to person, place, and time. A cranial nerve deficit is present.   Left facial droop and ptosis  Right UE and LE 4/5 strength and left UE and LE both 4/5 but seem stronger than the right     Skin: No rash noted. He is not diaphoretic. No erythema.   Psychiatric: He has a normal mood and affect. His behavior is normal.   Nursing note and vitals reviewed.      Results Review:       I reviewed the patient's new clinical results.    Results from last 7 days  Lab Units 18  0548 18  0546 18  0546 18  0523   WBC 10*3/mm3 8.47 10.76* 11.25* 8.38   HEMOGLOBIN g/dL 10.8* 11.2* 11.8* 10.6*   PLATELETS 10*3/mm3 281 322 332 321       Results from last 7 days  Lab Units  03/05/18  0548 03/04/18  1600 03/04/18  1210 03/04/18  0546 03/03/18  0546   SODIUM mmol/L 126* 124* 125* 124* 128*   POTASSIUM mmol/L 3.8  --  3.5 3.3* 3.8   CHLORIDE mmol/L 91*  --  88* 88* 92*   CO2 mmol/L 25.6  --  25.2 25.4 24.8   BUN mg/dL 19  --  17 14 14   CREATININE mg/dL 0.70*  --  0.66* 0.58* 0.54*   GLUCOSE mg/dL 94  --  120* 106* 118*   Estimated Creatinine Clearance: 72.8 mL/min (by C-G formula based on Cr of 0.7).    Results from last 7 days  Lab Units 03/05/18  0548 03/04/18  1210 03/04/18  0546 03/03/18  0546  03/01/18  1243   CALCIUM mg/dL 8.9 8.7 8.5* 8.4*  < > 10.1   ALBUMIN g/dL 2.50*  --   --   --   --  3.80   MAGNESIUM mg/dL 1.9  --   --   --   --  1.8   PHOSPHORUS mg/dL 3.2  --   --   --   --   --    < > = values in this interval not displayed.      aspirin 81 mg Oral Daily   atorvastatin 10 mg Oral Daily   cefepime 2 g Intravenous Q12H   enoxaparin 40 mg Subcutaneous Q24H   famotidine 20 mg Oral Daily   levETIRAcetam 1,000 mg Intravenous Q12H   metoprolol succinate XL 25 mg Oral Q24H   valsartan 320 mg Oral Daily   vancomycin 15 mg/kg Intravenous Q12H       Pharmacy to Dose enoxaparin (LOVENOX)    Pharmacy to dose vancomycin    Diet Regular; Daily Fluid Restriction; 1500 mL Fluid      Assessment/Plan   Active Hospital Problems (** Indicates Principal Problem)    Diagnosis Date Noted   • Brain abscess [G06.0] 03/04/2018   • Non-traumatic rhabdomyolysis [M62.82] 03/02/2018   • Atrial flutter [I48.92] 03/01/2018   • Hyponatremia [E87.1] 03/01/2018   • Fall [W19.XXXA] 03/01/2018   • UTI (urinary tract infection) [N39.0] 03/01/2018   • 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       Mr. Ward is a 88 y.o. male who has been admitted with fall    · MRI Shows brain abscess.  Started on clindamycin and Zosyn last  night which was changed to vancomycin and cefepime by infectious diseases.  Neurosurgery consulted for a possible intervention.  Also has destruction of his mastoid and ENT has been reconsulted   · Aflutter: new diagnosis, EKG and echo reviewed.  Rate controlled, on metoprolol, Cardiology consulted and f/u Dr. العلي in 4 wks  · UTI: no symptoms but reasonable to treat x 3 days, GPC  · Non-traumatic rhabdomyolysis: CK improving and not need to recheck, off IV fluids for below  · Hyponatremia now improving,  urine studies suggest SIADH  ? from malignancy-- TSH and am cortisol normal.  Fluid restricted. Nephrology  · Outpt follow up with ENT per Dr. Núñez  · Hypertension: was on both ACE-I and ARB, stopped ramipril and increased dose of valsartan--follow closely  · PT consulted    D/W Dr. Blunt and RN    Ky English MD  [facility]  03/05/18  3:41 PM

## 2018-03-05 NOTE — PLAN OF CARE
Problem: Patient Care Overview (Adult)  Goal: Plan of Care Review  Outcome: Ongoing (interventions implemented as appropriate)   03/05/18 9648   Coping/Psychosocial Response Interventions   Plan Of Care Reviewed With patient   Patient Care Overview   Progress progress toward functional goals as expected   Outcome Evaluation   Outcome Summary/Follow up Plan assist to guide rwx, but pt very agreeable even w/incr back pain/incr fatigue to amb today,

## 2018-03-05 NOTE — PLAN OF CARE
Problem: Patient Care Overview (Adult)  Goal: Plan of Care Review  Outcome: Ongoing (interventions implemented as appropriate)   03/05/18 4366   Coping/Psychosocial Response Interventions   Plan Of Care Reviewed With patient   Patient Care Overview   Progress no change   Outcome Evaluation   Outcome Summary/Follow up Plan Patient alert and oriented. Very forgetful. Family at bedside. A-flutter on monitor. Vital signs stable. MRI done yestereday. Neurosurgery Dr. Wade seen patient today. Will continue to monitor.        Problem: Pain, Chronic (Adult)  Goal: Acceptable Pain Control/Comfort Level  Outcome: Ongoing (interventions implemented as appropriate)      Problem: Fall Risk (Adult)  Goal: Absence of Falls  Outcome: Ongoing (interventions implemented as appropriate)      Problem: Infection, Risk/Actual (Adult)  Goal: Infection Prevention/Resolution  Outcome: Ongoing (interventions implemented as appropriate)

## 2018-03-05 NOTE — PLAN OF CARE
Problem: Patient Care Overview (Adult)  Goal: Plan of Care Review  Outcome: Ongoing (interventions implemented as appropriate)   03/05/18 0419   Coping/Psychosocial Response Interventions   Plan Of Care Reviewed With patient   Patient Care Overview   Progress no change   Outcome Evaluation   Outcome Summary/Follow up Plan Patient alert and oriented. Very forgetful- disoriented upon wakin up but reorients quickly. High BP at beginning of shift, but now WNL. A flutter on the monitor. Overnight HR down to low 50s at times. Temp of 101 F, relieved by tylenol. Medicated for pain x 1. MRI of brain showed abscess and edema. Started on Vanc and Zosyn and Keppra IV. Consulted Dr. Florence with neurosurgery- to see in AM. No other signs of distress. Will continue to monitor closely.       Problem: Pain, Chronic (Adult)  Goal: Acceptable Pain Control/Comfort Level  Outcome: Ongoing (interventions implemented as appropriate)      Problem: Fall Risk (Adult)  Goal: Absence of Falls  Outcome: Ongoing (interventions implemented as appropriate)      Problem: Infection, Risk/Actual (Adult)  Goal: Infection Prevention/Resolution  Outcome: Ongoing (interventions implemented as appropriate)

## 2018-03-05 NOTE — THERAPY TREATMENT NOTE
Acute Care - Physical Therapy Treatment Note  [facility]     Patient Name: Krzysztof Ward  : 1929  MRN: [id number]  Today's Date: 3/5/2018  Onset of Illness/Injury or Date of Surgery Date: 18          Admit Date: 3/1/2018    Visit Dx:    ICD-10-CM ICD-9-CM   1. Atrial flutter, unspecified type I48.92 427.32   2. Traumatic rhabdomyolysis, initial encounter T79.6XXA 958.6   3. Acute UTI, possible N39.0 599.0   4. 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               Adult Rehabilitation Note       18 1700 18 1100 18 1606    Rehab Assessment/Intervention    Discipline physical therapy assistant  - physical therapist  -LC physical therapist  -SA    Document Type therapy note (daily note)  - therapy note (daily note)  - therapy note (daily note)  -    Subjective Information agree to therapy;complains of;weakness;fatigue  - agree to therapy;complains of;weakness;fatigue;pain  - agree to therapy;complains of;pain  -SA    Patient Effort, Rehab Treatment  good  -LC good  -SA    Precautions/Limitations fall precautions  - fall precautions  -     Recorded by [JM] Charlee Felton, PTA [LC] Gorge Valente, PT DPT [SA] Hetal Medina, PT    Pain Assessment    Pain Assessment 0-10  - 0-10  -LC 0-10  -SA    Pain Score 7  -JM 5  -LC 5  -SA    Pain Type Chronic pain  -      Pain Location Back  - Back  - Back  -SA    Pain Intervention(s)  Repositioned;Ambulation/increased activity  - Repositioned  - Ambulation/increased activity;Rest  -SA    Response to Interventions stewart  -  tolerated  -SA    Recorded by [] Charlee Felton, PTA [LC] Gorge Valente, PT DPT [SA] Hetal Medina PT    Cognitive Assessment/Intervention    Current Cognitive/Communication Assessment  functional  - functional  -SA    Orientation Status  oriented to;person;place;situation  - oriented to;person;place  -    Follows Commands/Answers Questions  100% of the time;able to follow single-step instructions  - 100% of the time;able to follow single-step instructions  -    Personal Safety  WNL/WFL  - WNL/WFL  -    Personal Safety Interventions  fall prevention program maintained;muscle strengthening facilitated;gait belt;nonskid shoes/slippers when out of bed  - fall prevention program maintained;gait belt;nonskid shoes/slippers when out of bed;supervised activity  -SA    Recorded by  [] Gorge Valente, PT DPT [SA] Hetal Medina PT    Bed Mobility, Assessment/Treatment    Bed Mobility, Assistive Device bed rails;draw sheet  - bed rails;head of bed elevated  -     Bed Mobility, Scoot/Bridge, [place] minimum assist (75% patient effort);verbal cues required;nonverbal cues required (demo/gesture)  - minimum assist (75% patient effort)  -     Bed Mob, Supine to Sit, [place] minimum assist (75% patient effort);moderate assist (50% patient effort);verbal cues required;nonverbal cues required (demo/gesture)  - minimum assist (75% patient effort)  - minimum assist (75% patient effort)  -    Bed Mobility, Safety Issues decreased use of arms for pushing/pulling;decreased use of legs for bridging/pushing  -      Bed Mobility, Impairments strength decreased  - strength decreased;impaired balance  -     Recorded by [] Charlee Felton, PTA [LC] Gorge Valente, PT DPT [SA] Hetal Medina PT    Transfer Assessment/Treatment    Transfers, Sit-Stand  [place] minimum assist (75% patient effort);moderate assist (50% patient effort);verbal cues required  - moderate assist (50% patient effort)  - moderate assist (50% patient effort);2 person assist required  -    Transfers, Stand-Sit [place] contact guard assist;verbal cues required  - minimum assist (75% patient effort)  - minimum assist (75% patient effort)  -    Transfers, Sit-Stand-Sit, Assist Device rolling walker;elevated surface  - rolling walker  - rolling walker  -    Transfer, Impairments strength decreased;pain  - strength decreased;impaired balance  -     Recorded by [] Charlee Felton PTA [LC] Gorge Valente, PT DPT [SA] Hetal Medina, PT    Gait Assessment/Treatment    Gait, [place] Level minimum assist (75% patient effort);verbal cues required;1 person + 1 person to manage equipment  - contact guard assist  - contact guard assist  -    Gait, Assistive Device rolling walker  - rolling walker  - rolling walker  -    Gait, Distance (Feet) 45  - 40  - 40  -SA    Gait, Gait Deviations giovani decreased;antalgic;forward flexed posture;step length decreased;narrow base  - narrow base;step length decreased;forward flexed posture  - giovani decreased;decreased heel strike;forward flexed posture;step length decreased  -    Gait, Safety Issues step length decreased;loses balance backward  - weight-shifting ability decreased  -     Gait, Impairments strength decreased;impaired balance;coordination impaired  - strength decreased;impaired balance  -     Gait, Comment fatigues quickly, assist to guide rwx by end of amb  -      Recorded by [] Charlee Felton PTA [LC] Gorge Valente, PT DPT [SA] Hetal Medina, PT    Therapy Exercises    Bilateral Lower Extremities   AROM:;5 reps;sitting;ankle pumps/circles;hip flexion;LAQ  -SA    Recorded by   [SA] Hetal Medina, PT    Positioning and Restraints    Pre-Treatment Position in bed  - in bed  - in  bed  -SA    Post Treatment Position  bed  -LC chair  -SA    In Bed fowlers;call light within reach;encouraged to call for assist;exit alarm on;with family/caregiver;with nsg  -JM fowlers;call light within reach;encouraged to call for assist;exit alarm on;with family/caregiver;side rails up x2  -LC     In Chair   sitting;call light within reach;encouraged to call for assist;with family/caregiver;notified nsg  -SA    Recorded by [] Charlee Felton, MARCEL [LC] Gorge Valente, PT DPT [SA] Hetal Medina PT      User Key  (r) = Recorded By, (t) = Taken By, (c) = Cosigned By    Initials Name Effective Dates    MONICA Felton, MARCEL 02/18/16 -     LC Gorge Valente, PT DPT 08/02/16 -     SA Hetal Medina, PT 08/03/17 -                 IP PT Goals       03/02/18 1429          Bed Mobility PT LTG    Bed Mobility PT LTG, Time to Achieve 1 wk  -CH      Bed Mobility PT LTG, Activity Type all bed mobility  -CH      Bed Mobility PT LTG, [place] Level supervision required  -CH      Transfer Training PT LTG    Transfer Training PT LTG, Time to Achieve 1 wk  -CH      Transfer Training PT LTG, Activity Type all transfers  -CH      Transfer Training PT LTG, [place] Level supervision required  -CH      Transfer Training PT LTG, Assist Device walker, rolling  -CH      Gait Training PT LTG    Gait Training Goal PT LTG, Time to Achieve 1 wk  -CH      Gait Training Goal PT LTG, [place] Level supervision required  -CH      Gait Training Goal PT LTG, Assist Device walker, rolling  -CH      Gait Training Goal PT LTG, Distance to Achieve 100  -CH        User Key  (r) = Recorded By, (t) = Taken By, (c) = Cosigned By    Initials Name Provider Type    CH Rebecca Clay PT Physical Therapist          Physical Therapy Education     Title: PT OT SLP Therapies (Done)     Topic: Physical Therapy (Done)     Point: Mobility training (Done)    Learning Progress Summary    Learner Readiness Method Response Comment Documented by Status    Patient Acceptance E,TB,D VU,NR family stepped out during amb due to multiple visitors and very little space; they observed from [facility] 03/05/18 1731 Done    Acceptance E,D VU,DU,NR safety during ambulation  03/04/18 1128 Done    Acceptance E VU,NR   03/03/18 1610 Done    Acceptance E,TB,D NR,VU   03/02/18 1428 Done               Point: Home exercise program (Done)    Learning Progress Summary    Learner Readiness Method Response Comment Documented by Status   Patient Acceptance E,TB,D VU,NR family stepped out during amb due to multiple visitors and very little space; they observed from [facility] 03/05/18 1731 Done    Acceptance E VU,NR   03/03/18 1610 Done               Point: Body mechanics (Done)    Learning Progress Summary    Learner Readiness Method Response Comment Documented by Status   Patient Acceptance E,TB,D VU,NR family stepped out during amb due to multiple visitors and very little space; they observed from [facility] 03/05/18 1731 Done    Acceptance E VU,NR   03/03/18 1610 Done    Acceptance E,TB,D NR,VU   03/02/18 1428 Done               Point: Precautions (Done)    Learning Progress Summary    Learner Readiness Method Response Comment Documented by Status   Patient Acceptance E,TB,D VU,NR family stepped out during amb due to multiple visitors and very little space; they observed from [facility] 03/05/18 1731 Done    Acceptance E VU,NR   03/03/18 1610 Done    Acceptance E,TB,D NR,VU   03/02/18 1428 Done                      User Key     Initials Effective Dates Name Provider Type Discipline     12/01/15 -  Rebecca Clay, PT Physical Therapist PT     02/18/16 -  Charlee Felton, PTA Physical Therapy Assistant PT     08/02/16 -  Gorge Valente, PT DPT Physical Therapist PT     08/03/17 -  Hetal Medina, VALENTIN Physical Therapist PT                    PT Recommendation and Plan  Anticipated Discharge Disposition: skilled nursing facility  Planned Therapy Interventions:  balance training, bed mobility training, gait training, home exercise program, patient/family education, transfer training  PT Frequency: daily  Plan of Care Review  Plan Of Care Reviewed With: patient  Progress: progress toward functional goals as expected  Outcome Summary/Follow up Plan: assist to guide rwx, but pt very agreeable even w/incr back pain/incr fatigue to amb today,           Outcome Measures       03/05/18 1700 03/04/18 1100 03/03/18 1600    How much help from another person do you currently need...    Turning from your back to your side while in flat bed without using bedrails? 3  -JM 3  -LC 3  -SA    Moving from lying on back to sitting on the side of a flat bed without bedrails? 3  -JM 3  -LC 3  -SA    Moving to and from a bed to a chair (including a wheelchair)? 3  - 3  -LC 3  -SA    Standing up from a chair using your arms (e.g., wheelchair, bedside chair)? 2  - 2  -LC 2  -SA    Climbing 3-5 steps with a railing? 1  - 2  -LC 2  -SA    To walk in hospital room? 3  -JM 3  -LC 3  -SA    AM-PAC 6 Clicks Score 15  - 16  -LC 16  -SA    Functional Assessment    Outcome Measure Options  AM-PAC 6 Clicks Basic Mobility (PT)  -LC AM-PAC 6 Clicks Basic Mobility (PT)  -SA      User Key  (r) = Recorded By, (t) = Taken By, (c) = Cosigned By    Initials Name Provider Type    MONICA Felton PTA Physical Therapy Assistant    MP Valente, PT DPT Physical Therapist    SA Hetal Medina, PT Physical Therapist           Time Calculation:         PT Charges       03/05/18 1732          Time Calculation    Start Time 1655  -      Stop Time 1713  -      Time Calculation (min) 18 min  -MONICA      PT Received On 03/05/18  -MONICA      PT - Next Appointment 03/06/18  -MONICA        User Key  (r) = Recorded By, (t) = Taken By, (c) = Cosigned By    Initials Name Provider Type    MONICA Felton PTA Physical Therapy Assistant          Therapy Charges for Today     Code Description Service Date Service Provider  Modifiers Qty    13083186179 HC PT THER PROC EA 15 MIN 3/5/2018 Charlee Felton, PTA GP 1    96117188881 HC PT THER SUPP EA 15 MIN 3/5/2018 Charlee Felton PTA GP 1          PT G-Codes  Outcome Measure Options: AM-PAC 6 Clicks Basic Mobility (PT)    Charlee Felton PTA  3/5/2018

## 2018-03-05 NOTE — CONSULTS
"Patient name: Krzysztof Ward  Referring Provider: Dr. Key  Reason for Consultation: left temporal lobe mass    Patient Care Team:  Willie Ramirez MD as PCP - General (Family Medicine)  Karla Bush MD as Consulting Physician (Radiation Oncology)  Deb Amaro MD as Consulting Physician (Ophthalmology)  Tushar Núñez MD as Consulting Physician (Otolaryngology)    Chief complaint: fall    Subjective .     History of present illness:    Patient is a 88 y.o.  male who presents with fall at home on 3/1/18 due to generalized weakness. He was alone until his family found him later in he morning unable to get up. He denies associated numbness, tingling, incontinence, LOC. He also denied CP, SOA. He was found on admission to have new onset A-fib and UTI. He is on Lovenox presently. 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 He had lack of follow up until 6/2017 due to caring for ill wife who passed. He has noted ear drainage for some time and when he was seen, inner ear tumor was found. He had XRT in Sept/Oct 2017 for 32 treatments with  radiation center. At follow up in 2/2018 a CT showed  \"abnormal\" and he was referred to [facility]. Tumor board review recommended MRI brain and then urgent appt with ENT for biopsy. This was scheduled for 3/2/18 with Dr. Burleson, but patient ended up here at [facility] prior. He does complain of \"little headache\", non focal and 6-7/10 for the past week. He has not taken anything for the pain. It is not associated with dizziness, vision changes, or nausea. He has also noticed progressive left facial weakness with inability to close the left eye since mid February but worsened in the last week. He is using eye drops.    Review of Systems  Review of Systems   Constitutional: Positive for fever (yesterday, not at home). Negative for appetite change, chills and unexpected weight change.   HENT: Positive for ear discharge and ear pain.    Eyes: " Negative for visual disturbance.   Respiratory: Negative for shortness of breath.    Cardiovascular: Negative for chest pain.   Gastrointestinal: Negative for nausea and vomiting.   Genitourinary: Negative for enuresis.   Musculoskeletal: Positive for back pain (chronic).   Neurological: Positive for facial asymmetry, weakness (generalized) and headaches. Negative for speech difficulty, light-headedness and numbness.   Psychiatric/Behavioral: Negative for confusion.       History  PAST MEDICAL HISTORY  Past Medical History:   Diagnosis Date   • H/O degenerative disc disease    • Hypertension    • Osteoporosis    • Squamous cell cancer of external ear, left        PAST SURGICAL HISTORY  Past Surgical History:   Procedure Laterality Date   • BACK SURGERY     • EXTERNAL EAR SURGERY     • REPLACEMENT TOTAL KNEE Left        FAMILY HISTORY  History reviewed. No pertinent family history.    SOCIAL HISTORY  Social History   Substance Use Topics   • Smoking status: Former Smoker   • Smokeless tobacco: Former User      Comment: quit 1962   • Alcohol use Yes      Comment: a beer now and then       retired  Lives at home alone; family support present    Allergies:  Review of patient's allergies indicates no known allergies.    MEDICATIONS:  Prescriptions Prior to Admission   Medication Sig Dispense Refill Last Dose   • alendronate (FOSAMAX) 70 MG tablet TAKE 1 TABLET EVERY 7 DAYS 12 tablet 3    • famotidine (PEPCID) 20 MG tablet Take by mouth daily.   Taking   • Glucosamine HCl 500 MG tablet Take 500 mg by mouth Daily.   Taking   • HYDROcodone-acetaminophen (NORCO) 7.5-325 MG per tablet Take 1 tablet by mouth Every 8 (Eight) Hours As Needed for Moderate Pain . 90 tablet 0    • Multiple Vitamin (MULTI-VITAMIN) tablet Take 1 tablet by mouth Daily.   Taking   • ramipril (ALTACE) 10 MG capsule TAKE 2 CAPSULES DAILY 180 capsule 0    • senna-docusate (PERICOLACE) 8.6-50 MG per tablet Take 1 tablet by mouth As Needed.   Taking    • simvastatin (ZOCOR) 20 MG tablet Take 1 tablet by mouth Every Night. 90 tablet 3 Taking   • valsartan (DIOVAN) 160 MG tablet Take 1 tablet by mouth Daily. 90 tablet 3 Taking       Current Facility-Administered Medications:   •  acetaminophen (TYLENOL) tablet 650 mg, 650 mg, Oral, Q4H PRN, Fern Hart MD, 650 mg at 03/04/18 1949  •  aluminum-magnesium hydroxide-simethicone (MAALOX MAX) 400-400-40 MG/5ML suspension 15 mL, 15 mL, Oral, Q6H PRN, Fern Hart MD  •  artificial tears (LUBRIFRESH P.M.) ophthalmic ointment, , Left Eye, Nightly, AIXA Duran  •  aspirin EC tablet 81 mg, 81 mg, Oral, Daily, Eddy العلي MD, 81 mg at 03/05/18 0826  •  atorvastatin (LIPITOR) tablet 10 mg, 10 mg, Oral, Daily, Fern Hart MD, 10 mg at 03/05/18 0826  •  bisacodyl (DULCOLAX) EC tablet 5 mg, 5 mg, Oral, Daily PRN, Fern Hart MD  •  cefepime (MAXIPIME) 2 g/100 mL 0.9% NS (mbp), 2 g, Intravenous, Q12H, Evan Blunt MD, Last Rate: 200 mL/hr at 03/05/18 1054, 2 g at 03/05/18 1054  •  enoxaparin (LOVENOX) syringe 40 mg, 40 mg, Subcutaneous, Q24H, Eddy العلي MD, 40 mg at 03/05/18 0831  •  famotidine (PEPCID) tablet 20 mg, 20 mg, Oral, Daily, Fern Hart MD, 20 mg at 03/05/18 0826  •  Glycerin-Hypromellose- (ARTIFICIAL TEARS) 0.2-0.2-1 % ophthalmic solution solution 1 drop, 1 drop, Left Eye, Q2H PRN, AIXA Duran  •  hydrALAZINE (APRESOLINE) tablet 25 mg, 25 mg, Oral, Q8H PRN, Ky English MD  •  HYDROcodone-acetaminophen (NORCO) 7.5-325 MG per tablet 1 tablet, 1 tablet, Oral, Q8H PRN, Fern Hart MD, 1 tablet at 03/04/18 2040  •  levETIRAcetam in NaCl 0.75% (KEPPRA) IVPB 1,000 mg, 1,000 mg, Intravenous, Q12H, Ky English MD, 1,000 mg at 03/05/18 0826  •  metoprolol succinate XL (TOPROL-XL) 24 hr tablet 25 mg, 25 mg, Oral, Q24H, Eddy العلي MD, 25 mg at 03/05/18 0826  •  ondansetron (ZOFRAN)  tablet 4 mg, 4 mg, Oral, Q6H PRN **OR** ondansetron ODT (ZOFRAN-ODT) disintegrating tablet 4 mg, 4 mg, Oral, Q6H PRN **OR** ondansetron (ZOFRAN) injection 4 mg, 4 mg, Intravenous, Q6H PRN, Fern Hart MD  •  Pharmacy to Dose enoxaparin (LOVENOX), , Does not apply, Continuous PRN, Eddy العلي MD  •  Pharmacy to dose vancomycin, , Does not apply, Continuous PRN, Ky English MD  •  potassium chloride (KLOR-CON) packet 40 mEq, 40 mEq, Oral, PRN, Cecil White MD  •  potassium chloride (MICRO-K) CR capsule 40 mEq, 40 mEq, Oral, PRN, Cecil White MD, 40 mEq at 03/04/18 2253  •  sodium chloride 0.9 % flush 10 mL, 10 mL, Intravenous, PRN, Triage Protocol Emergency, MD  •  valsartan (DIOVAN) tablet 320 mg, 320 mg, Oral, Daily, Ky English MD, 320 mg at 03/05/18 0826  •  vancomycin 1250 mg/250 mL 0.9% NS IVPB ([facility]), 15 mg/kg, Intravenous, Q12H, Ky English MD, 1,250 mg at 03/05/18 0832    Objective     Results Review:  LABS:    Results from last 7 days  Lab Units 03/05/18  0548 03/04/18  0546 03/03/18  0546   WBC 10*3/mm3 8.47 10.76* 11.25*   HEMOGLOBIN g/dL 10.8* 11.2* 11.8*   HEMATOCRIT % 32.7* 33.1* 35.3*   PLATELETS 10*3/mm3 281 322 332         Results from last 7 days  Lab Units 03/05/18  0548 03/04/18  1600 03/04/18  1210 03/04/18  0546  03/01/18  1243   SODIUM mmol/L 126* 124* 125* 124*  < > 130*   POTASSIUM mmol/L 3.8  --  3.5 3.3*  < > 4.6   CHLORIDE mmol/L 91*  --  88* 88*  < > 91*   CO2 mmol/L 25.6  --  25.2 25.4  < > 27.0   BUN mg/dL 19  --  17 14  < > 16   CREATININE mg/dL 0.70*  --  0.66* 0.58*  < > 0.71*   CALCIUM mg/dL 8.9  --  8.7 8.5*  < > 10.1   BILIRUBIN mg/dL  --   --   --   --   --  0.9   ALK PHOS U/L  --   --   --   --   --  107   ALT (SGPT) U/L  --   --   --   --   --  27   AST (SGOT) U/L  --   --   --   --   --  34   GLUCOSE mg/dL 94  --  120* 106*  < > 116*   < > = values in this interval not displayed.        Results from last 7  days  Lab Units 03/05/18  0548   CRP mg/dL 5.38*     Blood cx- NGTD    Urine culture- 50K CFU streptococcus mitis    DIAGNOSTICS:  MRI brain from [facility] dated March 4, 2018 was reviewed and discussed with Dr. Wade.  This reveals a left temporal/mastoid air cell abnormality as well as bony temporal mass.  There is edema within the temporal lobe and enhancement.  Significant diffuse brain atrophy present    Results Review:   I reviewed the patient's new clinical results.  I personally viewed and interpreted the patient's MRI brain    Vital Signs   Temp:  [97.4 °F (36.3 °C)-101.1 °F (38.4 °C)] 97.5 °F (36.4 °C)  Heart Rate:  [53-79] 79  Resp:  [16-18] 18  BP: (120-192)/(65-93) 161/67    Physical Exam:  Physical Exam   Constitutional: He appears well-developed and well-nourished.   HENT:   Right Ear: External ear normal.   Left Ear: No drainage or tenderness. Decreased hearing is noted.   Loss of tissue on left pinna   Eyes: EOM are normal. Pupils are equal, round, and reactive to light.   Neck: Neck supple. Normal carotid pulses present. Carotid bruit is not present.   Cardiovascular: Normal rate, normal heart sounds and intact distal pulses.  An irregularly irregular rhythm present.   No murmur heard.  Pulses:       Carotid pulses are 2+ on the right side, and 2+ on the left side.       Radial pulses are 2+ on the right side, and 2+ on the left side.   Pulmonary/Chest: Effort normal and breath sounds normal.   Neurological: He is alert. He has normal strength. He has a normal Finger-Nose-Finger Test.   Reflex Scores:       Tricep reflexes are 1+ on the right side and 1+ on the left side.       Bicep reflexes are 1+ on the right side and 1+ on the left side.       Brachioradialis reflexes are 1+ on the right side and 1+ on the left side.       Patellar reflexes are 1+ on the right side and 1+ on the left side.  Skin: Skin is warm and dry.   Psychiatric: He has a normal mood and affect. His speech is  normal and behavior is normal. Judgment normal.   Vitals reviewed.    Neurologic Exam     Mental Status   Oriented to person.   Oriented to place.   Disoriented to time.   Follows 3 step commands.   Attention: normal. Concentration: normal.   Speech: speech is normal   Level of consciousness: alert  Knowledge: good.   Normal comprehension.     Cranial Nerves     CN II   Visual fields full to confrontation.     CN III, IV, VI   Pupils are equal, round, and reactive to light.  Extraocular motions are normal.   CN III: no CN III palsy  CN VI: no CN VI palsy  Nystagmus: none   Diplopia: none    CN V   Facial sensation intact.     CN VII   Left facial weakness: peripheral (severe)    CN VIII   Hearing: impaired (left)    CN IX, X   CN IX normal.   CN X normal.     CN XI   CN XI normal.     CN XII   Tongue: not atrophic  Tongue deviation: right    Motor Exam   Muscle bulk: decreased (generalized)  Right arm pronator drift: absent  Left arm pronator drift: absent    Strength   Strength 5/5 throughout.     Sensory Exam   Light touch normal.     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 He has left facial weakness due the mass. He also has new comorbidities including A-fib and UTI. [facility]  [facility] and [facility] upon referral from  radiation. The question is to whether to biopsy here. Dr. Wade is considering options and will follow up later.     Mauro addendum: This patient is extremely complex problem.  He has a growing mass lesion in the left temporal bone as well as enhancing lesion in the adjacent inferior medial temporal.  The imaging characteristics of the brain lesion consistent with brain abscess.  Certainly extension of tumor through the dura into this area is also possible.    Surgical management of this problem including \"simple\" biopsy of the brain lesion would, in my opinion, require resection of the temporal bone lesion with repair of the skull base.  Without definitive management of the temporal bone lesion I think that we would get a very high risk of CSF leak or recurrence of brain lesion.    I had a long discussion with patient and the patient's 2 sons with regard to the patient's expectations of management would be.  Certainly the new onset of atrial flutter, associated with his chronological age increases the risks of any kind of operative intervention.  The patient and his sons indicate that they are most interested in quality of life.  I explained that surgical management would be unlikely to restore facial movement on the left side (the patient's facial paralysis is likely secondary to the process in the temporal bone) or hearing.  I explained that in my patient and surgical management carried a 10% or higher risk of a complication in that recovery from this lesion, and a younger man without comorbidities with take between 8 and 16 weeks.    The patient will discuss his life expectations with his children.    Treatment options include intravenous antibiotics for the time being and if he fails intravenous antibiotics and consideration of more definitive surgical management.    If he opts for aggressive management of this lesion, he may best be served by management at the [facility] with a more comprehensive skull base approach involving a skull base/neuro-otologic ENT surgeon.    I discussed the patients findings and my recommendations with patient, family and Dr. Mauro Antonio, APRN  03/05/18  3:41 PM    Jared Wade MD

## 2018-03-05 NOTE — PROGRESS NOTES
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  Patient has failed radiation therapy with either persistence or recurrence of tumor.  He was evaluated at [facility] several weeks ago.  Over the last week he has developed more confusion and some weakness with left facial paralysis.    Physical exam  elderly gentleman who appears more frail than when I last saw him approximately 1 month ago.     He has complete left facial paralysis.  The left ear canal has visible tumor occluding the ear canal and    visualization of the left eardrum.    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  I had a very lengthy discussion with the family.  I don't think any treatment option is a good choice given his age and frailty.  I think hospice should be contacted.  This was my recommendation to the family and they seem receptive to this idea.

## 2018-03-05 NOTE — PROGRESS NOTES
Continued Stay Note  [facility]     Patient Name: Krzysztof Ward  MRN: [id number]  Today's Date: 3/5/2018    Admit Date: 3/1/2018          Discharge Plan       03/05/18 1610    Case Management/Social Work Plan    Plan Plan [facility] pending bed availability and [employer] pre cert.  Lyly, JERICA    Additional Comments Spoke with pt and pt's son Krzysztof Ward Jr  ( [phone]) plan continues to be [facility] for skilled care.  Lyly RN              Discharge Codes     None            Margret Holt, RN

## 2018-03-05 NOTE — PROGRESS NOTES
"Pharmacokinetic Consult - Vancomycin Dosing (Initial Note)    Krzysztof Ward has been consulted for pharmacy to dose vancomycin for CNS infection.  Pharmacy dosing vancomycin per Amador's request.   Goal trough: 15-20 mg/L   Other antimicrobials: zosyn    Relevant clinical data and objective history reviewed:  88 y.o. male 182.9 cm (72\") 82.1 kg (181 lb)    Past Medical History:   Diagnosis Date   • H/O degenerative disc disease    • Hypertension    • Osteoporosis    • Skin cancer      Creatinine   Date Value Ref Range Status   03/04/2018 0.66 (L) 0.76 - 1.27 mg/dL Final   03/04/2018 0.58 (L) 0.76 - 1.27 mg/dL Final   03/03/2018 0.54 (L) 0.76 - 1.27 mg/dL Final     BUN   Date Value Ref Range Status   03/04/2018 17 8 - 23 mg/dL Final     Estimated Creatinine Clearance: 74.1 mL/min (by C-G formula based on Cr of 0.66).    Lab Results   Component Value Date    WBC 10.76 (H) 03/04/2018     Temp Readings from Last 3 Encounters:   03/04/18 (!) 101.1 °F (38.4 °C) (Oral)   01/02/18 97.5 °F (36.4 °C)   11/09/17 97 °F (36.1 °C) (Oral)      Baseline culture/source/susceptibility:     Assessment/Plan  Will start vancomycin 1250 mg IV q12 hours. Will monitor serum creatinine every 24 hours for the first 3 days then at least every 48 hours per dosing recommendations. Vancomycin level 15-20. Pharmacy will continue to follow daily while on vancomycin and adjust as needed.     Eagle Tripathi RPH  "

## 2018-03-05 NOTE — CONSULTS
Referring Provider: Fern Hart MD  [address]  [place] [address]    Reason for Consultation: brain abscess    History of present illness:  Mr Ward is a 89 YO who I am asked to evaluate and give opinion for brain abscess. 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 He had resection of part of his left ear in 2017 followed by 32 radiation treatments. Unfortunately he says he recently found out the cancer returned and has been undergoing workup at [facility]. He came in on 3/1/18 with weakness and a fall. He says he fell after going to the bathroom in the middle of the night and his son found him down the next morning. He was found to be in atrial flutter. Cranial imaging has since revealed what appears to be a temporal abscess. He reports dull pain at the area worse w/ palpation. There is associated fluctuance but no erythema. No alleviating factors. He was started on vancomycin and Zosyn yesterday afternoon. ENT and NSGY also consulted.    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  DJD  HTN  Osteoporosis  Knee surgery  Ear surgery    Social History:    Retired from [employer]    Family History:  No 1st degree relatives w/ brain abscess    Allergies:  No Antibiotic Allergies    Medications:    Current Facility-Administered Medications:   •  acetaminophen (TYLENOL) tablet 650 mg, 650 mg, Oral, Q4H PRN, Fern Hart MD, 650 mg at 03/04/18 1949  •  aluminum-magnesium hydroxide-simethicone (MAALOX MAX) 400-400-40 MG/5ML suspension 15 mL, 15 mL, Oral, Q6H PRN, Fern Hart MD  •  aspirin EC tablet 81 mg, 81 mg, Oral, Daily, Eddy العلي MD, 81 mg at 03/05/18 0826  •  atorvastatin (LIPITOR) tablet 10 mg, 10 mg, Oral, Daily, Fern Hart MD, 10 mg at 03/05/18 0826  •  bisacodyl (DULCOLAX) EC tablet  5 mg, 5 mg, Oral, Daily PRN, Fern Hart MD  •  enoxaparin (LOVENOX) syringe 40 mg, 40 mg, Subcutaneous, Q24H, Eddy العلي MD, 40 mg at 03/04/18 0816  •  famotidine (PEPCID) tablet 20 mg, 20 mg, Oral, Daily, Fern Hart MD, 20 mg at 03/05/18 0826  •  hydrALAZINE (APRESOLINE) tablet 25 mg, 25 mg, Oral, Q8H PRN, Ky English MD  •  HYDROcodone-acetaminophen (NORCO) 7.5-325 MG per tablet 1 tablet, 1 tablet, Oral, Q8H PRN, Fern Hart MD, 1 tablet at 03/04/18 2040  •  levETIRAcetam in NaCl 0.75% (KEPPRA) IVPB 1,000 mg, 1,000 mg, Intravenous, Q12H, Ky English MD, 1,000 mg at 03/05/18 0826  •  metoprolol succinate XL (TOPROL-XL) 24 hr tablet 25 mg, 25 mg, Oral, Q24H, Eddy العلي MD, 25 mg at 03/05/18 0826  •  ondansetron (ZOFRAN) tablet 4 mg, 4 mg, Oral, Q6H PRN **OR** ondansetron ODT (ZOFRAN-ODT) disintegrating tablet 4 mg, 4 mg, Oral, Q6H PRN **OR** ondansetron (ZOFRAN) injection 4 mg, 4 mg, Intravenous, Q6H PRN, Fern Hart MD  •  Pharmacy to Dose enoxaparin (LOVENOX), , Does not apply, Continuous PRN, Edyd العلي MD  •  Pharmacy to dose vancomycin, , Does not apply, Continuous PRN, yK English MD  •  piperacillin-tazobactam (ZOSYN) 3.375 g in iso-osmotic dextrose 50 ml (premix), 3.375 g, Intravenous, Q8H, Ky English MD, 3.375 g at 03/05/18 0213  •  potassium chloride (KLOR-CON) packet 40 mEq, 40 mEq, Oral, PRN, Cecil White MD  •  potassium chloride (MICRO-K) CR capsule 40 mEq, 40 mEq, Oral, PRN, Cecil White MD, 40 mEq at 03/04/18 9331  •  sodium chloride 0.9 % flush 10 mL, 10 mL, Intravenous, PRN, Triage Protocol Emergency, MD  •  valsartan (DIOVAN) tablet 320 mg, 320 mg, Oral, Daily, Ky English MD, 320 mg at 03/05/18 0829  •  vancomycin 1250 mg/250 mL 0.9% NS IVPB ([facility]), 15 mg/kg, Intravenous, Q12H, Ky English MD, 1,250 mg at 03/04/18 3576      Review of Systems  All  systems were reviewed and are negative unless otherwise stated above in the HPI    Objective   Vital Signs   Temp:  [97.4 °F (36.3 °C)-101.1 °F (38.4 °C)] 97.4 °F (36.3 °C)  Heart Rate:  [53-77] 77  Resp:  [16-18] 18  BP: (120-192)/(65-93) 175/91    Physical Exam:   General: awake, alert, NAD, fair historian   Head: left mastoid with fluctuance, mild TTP  Eyes: L eye ptosis, PERRL, no scleral icterus, + conjunctival pallor, no conjunctival hemorrhages.   ENT: MM dry, large portion of left ear has been resected, no significant erythema  Neck: Supple, no visible thyromegaly  Cardiovascular: NR, irregular rhythm, no murmurs, rubs, or gallops; no LE edema  Respiratory: Lungs are clear to ascultation bilaterally, no rales or wheezing; normal work of breathing on ambient air  GI: Abdomen is soft, non-tender, non-distended, normal bowel sounds in all four quadrants; no hepatosplenomegaly, no masses palpated  : no Magana catheter present  Musculoskeletal: no joint abnormalities, normal musculature  Skin: dry skin, easy bruising  Neurological: Alert and oriented x 3,  motor strength 4/5 in all four extremities  Psychiatric: Normal mood and affect   Lymph: no pre-auricular, post-auricular, submandibular, cervical, supraclavicular  LAD  Vasc: no cyanosis; PIV w/o erythema    Labs:     Lab Results   Component Value Date    WBC 8.47 03/05/2018    HGB 10.8 (L) 03/05/2018    HCT 32.7 (L) 03/05/2018    MCV 90.3 03/05/2018     03/05/2018       Lab Results   Component Value Date    GLUCOSE 94 03/05/2018    BUN 19 03/05/2018    CREATININE 0.70 (L) 03/05/2018    EGFRIFNONA 106 03/05/2018    EGFRIFAFRI 98 01/02/2018    BCR 27.1 (H) 03/05/2018    CO2 25.6 03/05/2018    CALCIUM 8.9 03/05/2018    PROTENTOTREF 6.9 01/02/2018    ALBUMIN 2.50 (L) 03/05/2018    LABIL2 1.0 03/01/2018    AST 34 03/01/2018    ALT 27 03/01/2018   No results found for: SANCHEZ HUERTAS VANCORANDOM    No results found for: CRP    Microbiology:  3/4  BCx: NGTD  3/1 UCx:  Strep mitis    Radiology (personally reviewed images/report):  MRI brain with 4 x 2 cm left temporal abscess and small enhancing fossi elsewhere concerning for small abscesses. There is also mastoiditis and probable abscess as well as fluid at TMJ.    Assessment/Plan   1. 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 It seems some sort of debridement will be needed based on the scan results but will defer to their expertise. If so, please send cultures  -check CRP  -follow-up blood cultures    Thank you for this consult. ID will follow.

## 2018-03-05 NOTE — PROGRESS NOTES
"   LOS: 4 days    Patient Care Team:  Willie Ramirez MD as PCP - General (Family Medicine)  Karla Bush MD as Consulting Physician (Radiation Oncology)  Deb Amaro MD as Consulting Physician (Ophthalmology)  Tushar Núñez MD as Consulting Physician (Otolaryngology)    Chief Complaint:    Chief Complaint   Patient presents with   • Fall     0230 this morning, son found this morning.      Follow UP Hypertension and electrolyte abnormalities.  Subjective     Interval History:     Patient is feeling a little bit better.  Tolerating fluid restriction without problem.    Review of Systems:   No significant pain tolerating some by mouth.  Not orthostatic.    Objective     Vital Signs  Temp:  [97.4 °F (36.3 °C)-101.1 °F (38.4 °C)] 97.5 °F (36.4 °C)  Heart Rate:  [53-79] 79  Resp:  [16-18] 18  BP: (120-192)/(65-93) 161/67    Flowsheet Rows         First Filed Value    Admission Height  177.8 cm (70\") Documented at 03/01/2018 1206    Admission Weight  81.6 kg (180 lb) Documented at 03/01/2018 1206          I/O this shift:  In: 360 [P.O.:360]  Out: 500 [Urine:500]  I/O last 3 completed shifts:  In: 1000 [P.O.:650; IV Piggyback:350]  Out: 725 [Urine:725]    Intake/Output Summary (Last 24 hours) at 03/05/18 1801  Last data filed at 03/05/18 1600   Gross per 24 hour   Intake              910 ml   Output              750 ml   Net              160 ml       Physical Exam:  NAD; pleasant;  looks stated age  Marked facial droop on left; everted left lower lid with injected sclera  Partially removed pinna on left  Dry MM; no scleral icterus  No JVD; no carotid bruits  CTA bilat; not labored  Irregular, flutter with ectopy on monitor, no rub  Soft, NT, ND, BS+  No significant edema  Many AK's legs  No clubbing  Moves all extremities   Mood is good; affect normal      Results Review:      Results from last 7 days  Lab Units 03/05/18  0548 03/04/18  1600 03/04/18  1210 03/04/18  0546  03/01/18  1243   SODIUM mmol/L " 126* 124* 125* 124*  < > 130*   POTASSIUM mmol/L 3.8  --  3.5 3.3*  < > 4.6   CHLORIDE mmol/L 91*  --  88* 88*  < > 91*   CO2 mmol/L 25.6  --  25.2 25.4  < > 27.0   BUN mg/dL 19  --  17 14  < > 16   CREATININE mg/dL 0.70*  --  0.66* 0.58*  < > 0.71*   CALCIUM mg/dL 8.9  --  8.7 8.5*  < > 10.1   BILIRUBIN mg/dL  --   --   --   --   --  0.9   ALK PHOS U/L  --   --   --   --   --  107   ALT (SGPT) U/L  --   --   --   --   --  27   AST (SGOT) U/L  --   --   --   --   --  34   GLUCOSE mg/dL 94  --  120* 106*  < > 116*   < > = values in this interval not displayed.    Estimated Creatinine Clearance: 72.8 mL/min (by C-G formula based on Cr of 0.7).      Results from last 7 days  Lab Units 03/05/18  0548 03/01/18  1243   MAGNESIUM mg/dL 1.9 1.8   PHOSPHORUS mg/dL 3.2  --          Results from last 7 days  Lab Units 03/05/18  0548   URIC ACID mg/dL 2.3*         Results from last 7 days  Lab Units 03/05/18  0548 03/04/18  0546 03/03/18  0546 03/02/18  0523 03/01/18  1243   WBC 10*3/mm3 8.47 10.76* 11.25* 8.38 13.34*   HEMOGLOBIN g/dL 10.8* 11.2* 11.8* 10.6* 13.9   PLATELETS 10*3/mm3 281 322 332 321 387               Imaging Results (last 24 hours)     Procedure Component Value Units Date/Time    MRI Brain With & Without Contrast [[id number]] Collected:  03/04/18 2016     Updated:  03/04/18 2016    Narrative:       MRI EXAMINATION OF THE BRAIN WITH AND WITHOUT CONTRAST     HISTORY: Stroke.     TECHNIQUE: A MRI examination of the brain was performed before and after  the intravenous administration of contrast. No prior MRI of the brain is  available for comparison.     FINDINGS: There is moderate diffuse atrophy with secondary  ventriculomegaly.     The axial T2 sequence demonstrates increased signal intensity involving  the mastoid air cells on the left. There is intermediate signal on the  T2 sequence with enhancement involving the mastoid air cells more  superiorly extending to and involving the temporal bone. The  appearance  is nonspecific. This may be secondary to aggressive osteomyelitis,  however tumor cannot be excluded. This area measures approximately 3.3  cm transverse dimension and 2.8 cm in AP dimension. There is edema  appreciated involving the inferior aspect of the left temporal lobe.  There is a ring-enhancing focus involving the inferior aspect of the  left temporal lobe. This demonstrates increased signal intensity on the  diffusion sequence and is worrisome for empyema or abscess. On the  coronal T1 postcontrast sequence the ring enhancement appears to be  ocular with the larger component (the component evident on the axial  diffusion sequence) measuring 12 mm in transverse dimension, 6 mm in  craniocaudal dimension and 9 mm in AP dimension. More anterolaterally  there is a thin peripherally enhancing structure measuring approximately  5 mm in transverse dimension and 2.5 mm in craniocaudal dimension.  Posterolaterally there is a peripheral enhancement worrisome for empyema  measuring 1.5 mm in craniocaudal dimension and 7 mm in transverse  dimension.     There is extensive enhancement of the temporomandibular joint and  adjacent soft tissues. A fluid collection is noted multilocular anterior  and inferior to the temporomandibular joint on the left.       Impression:       There is edema involving the inferior aspect of the left temporal lobe  with the area of edema involving the cortex and subcortical white  matter. This area measures 4.1 cm x  2.3 cm in AP and transverse  dimensions respectively. There are small peripherally enhancing foci      of the left middle cranial fossa worrisome for empyemas. There is a  larger collection more medially which may represent a large empyema  although a superficial cortical abscess involving the inferior aspect of  the left temporal lobe cannot be excluded. There is extensive  enhancement involving the mastoid air cells on the left corresponding to  the lytic process  noted on the CT examination of 03/01/2018.     is  nonspecific. This may represent an extensive mastoiditis, aggressive  with abscess formation although tumor with superimposed infection could  not be excluded. There is enhancement involving the temporomandibular  joint on the left with fluid anterior to the left temporomandibular  joint. An infected collection could not be excluded.     The above information was called to and discussed with Dr. Muir on  3/4/2018 at 1951 hours.     CHECK CHECK                 artificial tears  Left Eye Nightly   aspirin 81 mg Oral Daily   atorvastatin 10 mg Oral Daily   cefepime 2 g Intravenous Q12H   enoxaparin 40 mg Subcutaneous Q24H   famotidine 20 mg Oral Daily   levETIRAcetam 1,000 mg Intravenous Q12H   metoprolol succinate XL 25 mg Oral Q24H   valsartan 320 mg Oral Daily   vancomycin 15 mg/kg Intravenous Q12H       Pharmacy to Dose enoxaparin (LOVENOX)    Pharmacy to dose vancomycin        Medication Review:   Current Facility-Administered Medications   Medication Dose Route Frequency Provider Last Rate Last Dose   • acetaminophen (TYLENOL) tablet 650 mg  650 mg Oral Q4H PRN Fern Hart MD   650 mg at 03/04/18 1949   • aluminum-magnesium hydroxide-simethicone (MAALOX MAX) 400-400-40 MG/5ML suspension 15 mL  15 mL Oral Q6H PRN Fern Hart MD       • artificial tears (LUBRIFRESH P.M.) ophthalmic ointment   Left Eye Nightly Radha Antonio, AIXA       • aspirin EC tablet 81 mg  81 mg Oral Daily Eddy العلي MD   81 mg at 03/05/18 0826   • atorvastatin (LIPITOR) tablet 10 mg  10 mg Oral Daily Fern Hart MD   10 mg at 03/05/18 0826   • bisacodyl (DULCOLAX) EC tablet 5 mg  5 mg Oral Daily PRN Fern Hart MD       • cefepime (MAXIPIME) 2 g/100 mL 0.9% NS (mbp)  2 g Intravenous Q12H Evan Blunt  mL/hr at 03/05/18 1054 2 g at 03/05/18 1054   • enoxaparin (LOVENOX) syringe 40 mg  40 mg Subcutaneous Q24H Eddy SANCHEZ  MD Severiano   40 mg at 03/05/18 0831   • famotidine (PEPCID) tablet 20 mg  20 mg Oral Daily Fern Hart MD   20 mg at 03/05/18 0826   • Glycerin-Hypromellose- (ARTIFICIAL TEARS) 0.2-0.2-1 % ophthalmic solution solution 1 drop  1 drop Left Eye Q2H PRN AIXA Duran       • hydrALAZINE (APRESOLINE) tablet 25 mg  25 mg Oral Q8H PRN Ky English MD       • HYDROcodone-acetaminophen (NORCO) 7.5-325 MG per tablet 1 tablet  1 tablet Oral Q8H PRN Fern Hart MD   1 tablet at 03/04/18 2040   • levETIRAcetam in NaCl 0.75% (KEPPRA) IVPB 1,000 mg  1,000 mg Intravenous Q12H Ky English MD   1,000 mg at 03/05/18 0826   • metoprolol succinate XL (TOPROL-XL) 24 hr tablet 25 mg  25 mg Oral Q24H Eddy العلي MD   25 mg at 03/05/18 0826   • ondansetron (ZOFRAN) tablet 4 mg  4 mg Oral Q6H PRN Fern Hart MD        Or   • ondansetron ODT (ZOFRAN-ODT) disintegrating tablet 4 mg  4 mg Oral Q6H PRN Fern Hart MD        Or   • ondansetron (ZOFRAN) injection 4 mg  4 mg Intravenous Q6H PRN Fern Hart MD       • Pharmacy to Dose enoxaparin (LOVENOX)   Does not apply Continuous PRN Eddy العلي MD       • Pharmacy to dose vancomycin   Does not apply Continuous PRN Ky English MD       • potassium chloride (KLOR-CON) packet 40 mEq  40 mEq Oral PRN Cecil White MD       • potassium chloride (MICRO-K) CR capsule 40 mEq  40 mEq Oral PRN Cecil White MD   40 mEq at 03/04/18 2253   • sodium chloride 0.9 % flush 10 mL  10 mL Intravenous PRN Triage Protocol Emergency, MD       • valsartan (DIOVAN) tablet 320 mg  320 mg Oral Daily Ky English MD   320 mg at 03/05/18 0826   • vancomycin 1250 mg/250 mL 0.9% NS IVPB ([facility])  15 mg/kg Intravenous Q12H Ky English MD   1,250 mg at 03/05/18 0832       Assessment/Plan       Active Problems:    Hypertension    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  Hyponatremia, acute on possibly chronic, and seemingly with euvolemia: probably multifactorial with poor solute intake, increased water intake, and possibly malignancy-driven paraneoplastic process.  Urine studies c/w SIADH.  TSH and cortisol fine.  On 1500 cc by mouth fluid restriction, sodium is slowly coming up.  Continue to monitor.  2.  SCC left ear and auditory canal with recurrence and suspected invasion, per ENT and neurosurgery.  3.  Left facial droop  4.  Atrial flutter  5.  Anemia.  6.  Leukocytosis  7.  Hypokalemia, replaced.  Discussed with family at bedside.  We'll follow.    Kelsi Archuleta MD  03/05/18  6:01 PM

## 2018-03-06 NOTE — PROGRESS NOTES
"Pharmacokinetic Consult - Vancomycin Dosing (Follow-up Note)    Krzysztof Ward is a 88 y.o. male who is on day 2 pharmacy to dose vancomycin for CNS infection / MRI showing left temporal abscess/empyema with moderate edema.  Also UTI  Pharmacy dosing vancomycin per Dr. English's request.   Other antimicrobials: Cefepime 2 gram IV q12h  Goal trough: 15-20 mg/L     Current Vancomycin dose: 1250mg IV q12h (~15.5mg/kg)     Neurosurgery, ID and ENT consulted    Relevant clinical data and objective history reviewed:  182.9 cm (72\")  82.9 kg (182 lb 11.2 oz)  Body mass index is 24.78 kg/(m^2).     He has a past medical history of H/O degenerative disc disease; Hypertension; Osteoporosis; and Squamous cell cancer of external ear, left.    Allergies as of 03/01/2018   • (No Known Allergies)     Vital Signs (last 24 hours)       03/05 0700  -  03/06 0659 03/06 0700  -  03/06 1033   Most Recent    Temp (°F) 97.4 -  98.3      96.8     96.8 (36)    Heart Rate 68 -  79      67     67    Resp   18      19     19    /73 -  175/91    164/84 -  (!) 201/83     164/84    SpO2 (%) 94 -  99       95        Estimated Creatinine Clearance: 74.8 mL/min (by C-G formula based on Cr of 0.54).    Results from last 7 days  Lab Units 03/06/18  0821 03/05/18  0548 03/04/18  1210   CREATININE mg/dL 0.54* 0.70* 0.66*       Results from last 7 days  Lab Units 03/06/18  0821 03/05/18  0548 03/04/18  0546   WBC 10*3/mm3 10.78* 8.47 10.76*     Baseline culture/source/susceptibility:   3/1 UC 50k strep mitis sens to PCN G and Vancomycin  3/4 BC prelim NG24h     Imaging:  3/4 MRI brain:  showing left temporal abscess/empyema with moderate edema - per Dr. English     Labs:  3/1 PCT 0.05  3/5 CRP 5.38     Recent Vancomycin dosing history:  3/4 2155 1250mg IV q12h (~15.5mg/kg)   3/6 0821 Vancomycin trough 12.7 mcg/mL    Lab Results   Component Value Date    SANCHEZ 12.70 03/06/2018     Assessment/Plan  1) Vancomycin trough level drawn prior to " 4th dose (regimen without a loading dose) and collected ~30 minutes post target time = 12.7 mcg/mL.  Target 15-20 mcg/mL.  Will give additional 500mg IV x1 dose after AM dose but anticipate vancomycin accumulation given patient age.  Repeat trough again Thursday AM for a steady state level unless renal function requires plan change.    2) Will monitor serum creatinine at least every 24h for first 72h followed by at least q48 hours per dosing recommendations.    3) Encourage hydration as allowed by MD to help prevent toxic accumulation; monitor for s/sxn of toxicity including increase in SCr and decrease in UOP.    Pharmacy will continue to follow daily while on vancomycin and adjust as needed.     Thanks, Stephon Tellez, PharmD, BCPS

## 2018-03-06 NOTE — PLAN OF CARE
Problem: Patient Care Overview (Adult)  Goal: Plan of Care Review  Outcome: Ongoing (interventions implemented as appropriate)   03/06/18 0402   Coping/Psychosocial Response Interventions   Plan Of Care Reviewed With patient   Patient Care Overview   Progress improving   Outcome Evaluation   Outcome Summary/Follow up Plan Patient alert and oriented- still forgetful. IV ABX given. VSS. Long runs of [place] on the monitor, K+ and Mag WNL, Dr. Lane aware. No other signs of distress noted. WIll continue to monitor.        Problem: Pain, Chronic (Adult)  Goal: Acceptable Pain Control/Comfort Level  Outcome: Ongoing (interventions implemented as appropriate)      Problem: Fall Risk (Adult)  Goal: Absence of Falls  Outcome: Ongoing (interventions implemented as appropriate)      Problem: Infection, Risk/Actual (Adult)  Goal: Infection Prevention/Resolution  Outcome: Ongoing (interventions implemented as appropriate)

## 2018-03-06 NOTE — PLAN OF CARE
Problem: Patient Care Overview (Adult)  Goal: Plan of Care Review  Outcome: Ongoing (interventions implemented as appropriate)   03/06/18 6595   Coping/Psychosocial Response Interventions   Plan Of Care Reviewed With patient;family   Patient Care Overview   Progress no change   Outcome Evaluation   Outcome Summary/Follow up Plan pt admitted to [facility] [place], [place] for palliative care.pt conditional code. pt has rested comfortably in bed. plan is for family to meet with [facility] tomorrow . continue to monitor     Goal: Adult Individualization and Mutuality  Outcome: Ongoing (interventions implemented as appropriate)    Goal: Discharge Needs Assessment  Outcome: Ongoing (interventions implemented as appropriate)      Problem: Pain, Chronic (Adult)  Goal: Acceptable Pain Control/Comfort Level  Outcome: Ongoing (interventions implemented as appropriate)      Problem: Fall Risk (Adult)  Goal: Absence of Falls  Outcome: Ongoing (interventions implemented as appropriate)      Problem: Infection, Risk/Actual (Adult)  Goal: Infection Prevention/Resolution  Outcome: Ongoing (interventions implemented as appropriate)

## 2018-03-06 NOTE — PROGRESS NOTES
Name: Krzysztof Ward ADMIT: 3/1/2018   : 1929  PCP: Willie Ramirez MD    MRN: [id number] LOS: 5 days   AGE/SEX: 88 y.o. male  ROOM: [facility]/   Subjective    Still feels weak    Sleepier today than yesterday  No soa, cough  No chest pain or palpitations  Subjective    Objective   Vital Signs  Temp:  [96.8 °F (36 °C)-98.3 °F (36.8 °C)] 97.5 °F (36.4 °C)  Heart Rate:  [63-78] 71  Resp:  [16-19] 16  BP: (164-201)/(68-87) 194/85  SpO2:  [95 %-98 %] 95 %  on   ;   O2 Device: room air  Body mass index is 24.78 kg/(m^2).    Physical Exam   Constitutional: He is oriented to person, place, and time. No distress.   HENT:   Right Ear: External ear normal.   Left ear deformity from SCC, left eye ptosis and facial droop related to SCC   Neck: No JVD present.   Cardiovascular: Normal rate.  An irregularly irregular rhythm present.   No murmur heard.  Pulmonary/Chest: Effort normal and breath sounds normal. No stridor. No respiratory distress. He has no wheezes.   Abdominal: Soft. Bowel sounds are normal. He exhibits no distension. There is no tenderness. There is no guarding.   Musculoskeletal: He exhibits no edema or tenderness.   Neurological: He is alert and oriented to person, place, and time. A cranial nerve deficit is present.   Left facial droop and ptosis  Right UE and LE 4/5 strength and left UE and LE both 4/5 but seem stronger than the right     Skin: No rash noted. He is not diaphoretic. No erythema.   Psychiatric: He has a normal mood and affect. His behavior is normal.   Nursing note and vitals reviewed.      Results Review:       I reviewed the patient's new clinical results.    Results from last 7 days  Lab Units 18  0821 18  0548 18  0546 18  0546   WBC 10*3/mm3 10.78* 8.47 10.76* 11.25*   HEMOGLOBIN g/dL 11.8* 10.8* 11.2* 11.8*   PLATELETS 10*3/mm3 333 281 322 332       Results from last 7 days  Lab Units 18  0821 18  2140 18  0548 18  1600 18  1210  03/04/18  0546   SODIUM mmol/L 127*  --  126* 124* 125* 124*   POTASSIUM mmol/L 3.8 4.5 3.8  --  3.5 3.3*   CHLORIDE mmol/L 88*  --  91*  --  88* 88*   CO2 mmol/L 27.6  --  25.6  --  25.2 25.4   BUN mg/dL 18  --  19  --  17 14   CREATININE mg/dL 0.54*  --  0.70*  --  0.66* 0.58*   GLUCOSE mg/dL 114*  --  94  --  120* 106*   Estimated Creatinine Clearance: 74.8 mL/min (by C-G formula based on Cr of 0.54).    Results from last 7 days  Lab Units 03/06/18  0821 03/05/18  2140 03/05/18  0548 03/04/18  1210 03/04/18  0546  03/01/18  1243   CALCIUM mg/dL 8.9  --  8.9 8.7 8.5*  < > 10.1   ALBUMIN g/dL 3.10*  --  2.50*  --   --   --  3.80   MAGNESIUM mg/dL  --  1.8 1.9  --   --   --  1.8   PHOSPHORUS mg/dL 2.7  --  3.2  --   --   --   --    < > = values in this interval not displayed.      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       Mr. Ward is a 88 y.o. male who has been  admitted with fall    · MRI Shows brain abscess.  Neurosurgery, ENT and infectious disease and several evaluated him.  Operation for his brain abscess and empyema per neurosurgery would be about an 8 hour procedure in many months for recovery.  ENT evaluated him and recommended hospice last night.  Had a discussion with the patient, his daughter in both of his sons decision was made to transfer to [facility] and consult hospice.  However, they want to continue care including IV antibiotics that he is getting now.  We will stop lab patient's wishes but continue with other care for now.    · I discussed CODE STATUS with the patient's family and they confirm he is a conditional code.  · Aflutter: new diagnosis, EKG and echo reviewed.  Rate controlled, on metoprolol, Cardiology consulted and f/u Dr. العلي in 4 wks  · UTI: no symptoms but treated x 3 days, GPC  · Non-traumatic rhabdomyolysis: CK improving and not need to recheck, off IV fluids for below  · Hyponatremia now improving,  urine studies suggest SIADH  ? from malignancy-- TSH and am cortisol normal.  Fluid restricted. Nephrology  · Hypertension: was on both ACE-I and ARB, stopped ramipril and increased dose of valsartan--follow closely  ·     D/W Dr. Blunt, Dr. Ellis and RN    Ky English MD  [facility]  03/06/18  4:13 PM

## 2018-03-06 NOTE — PAYOR COMM NOTE
"UR CONTACT:  MYRTLE                 P: [phone]  F: [phone]        Krzysztof Harrington (88 y.o. Male)     Date of Birth Social Security Number Address Home Phone MRN    09/02/1929  [id number] [id number] [phone] [id number]    Islam Marital Status          Jainism        Admission Date Admission Type Admitting Provider Attending Provider Department, Room/Bed    3/1/18 Emergency Fern Hart MD [place], Ky Harden MD [address], 651/1    Discharge Date Discharge Disposition Discharge Destination                      Attending Provider: Ky English MD     Allergies:  No Known Allergies    Isolation:  None   Infection:  None   Code Status:  FULL    Ht:  182.9 cm (72\")   Wt:  82.9 kg (182 lb 11.2 oz)    Admission Cmt:  None   Principal Problem:  None                Active Insurance as of 3/1/2018     Primary Coverage     Payor Plan Insurance Group Employer/Plan Group    AETNA MEDICARE REPLACEMENT AETNA [id number]     Payor Plan Address Payor Plan Phone Number Effective From Effective To    [address] [phone] 1/1/2018     [place] [address]       Subscriber Name Subscriber Birth Date Member ID       [id number] HARRINGTON 9/2/1929 [facility]                 Emergency Contacts      (Rel.) Home Phone Work Phone Mobile Phone    Krzysztof Harrington Jr (Son) -- [phone]            Lines, Drains & Airways    Active LDAs     Name:   Placement date:   Placement time:   Site:   Days:    Peripheral IV Line - Single Lumen 03/05/18 2055 basilic vein (medial side of arm), left 22 gauge  03/05/18 2055      less than 1    Peripheral IV Line - Single Lumen 03/06/18 0200 cephalic vein (lateral side of arm), right 22 gauge  03/06/18 0200      less than 1                Hospital Medications (active)       Dose Frequency Start End    acetaminophen (TYLENOL) tablet 650 mg 650 mg Every 4 Hours PRN 3/1/2018     Sig - Route: " Take 2 tablets by mouth Every 4 (Four) Hours As Needed for Mild Pain . - Oral    aluminum-magnesium hydroxide-simethicone (MAALOX MAX) 400-400-40 MG/5ML suspension 15 mL 15 mL Every 6 Hours PRN 3/1/2018     Sig - Route: Take 15 mL by mouth Every 6 (Six) Hours As Needed for Heartburn. - Oral    artificial tears (LUBRIFRESH P.M.) ophthalmic ointment  Nightly 3/5/2018     Sig - Route: Administer  into the left eye Every Night. - Left Eye    aspirin EC tablet 81 mg 81 mg Daily 3/2/2018     Sig - Route: Take 1 tablet by mouth Daily. - Oral    atorvastatin (LIPITOR) tablet 10 mg 10 mg Daily 3/2/2018     Sig - Route: Take 1 tablet by mouth Daily. - Oral    bisacodyl (DULCOLAX) EC tablet 5 mg 5 mg Daily PRN 3/1/2018     Sig - Route: Take 1 tablet by mouth Daily As Needed for Constipation. - Oral    cefepime (MAXIPIME) 2 g/100 mL 0.9% NS (mbp) 2 g Every 12 Hours 3/5/2018 4/22/2018    Sig - Route: Infuse 100 mL into a venous catheter Every 12 (Twelve) Hours. - Intravenous    enoxaparin (LOVENOX) syringe 40 mg 40 mg Every 24 Hours 3/3/2018     Sig - Route: Inject 0.4 mL under the skin Daily. - Subcutaneous    famotidine (PEPCID) tablet 20 mg 20 mg Daily 3/2/2018     Sig - Route: Take 1 tablet by mouth Daily. - Oral    Glycerin-Hypromellose- (ARTIFICIAL TEARS) 0.2-0.2-1 % ophthalmic solution solution 1 drop 1 drop Every 2 Hours PRN 3/5/2018     Sig - Route: Administer 1 drop into the left eye Every 2 (Two) Hours As Needed for Dry Eyes. - Left Eye    hydrALAZINE (APRESOLINE) tablet 25 mg 25 mg Every 8 Hours PRN 3/2/2018     Sig - Route: Take 1 tablet by mouth Every 8 (Eight) Hours As Needed (SBP >180 or DBP >110). - Oral    HYDROcodone-acetaminophen (NORCO) 7.5-325 MG per tablet 1 tablet 1 tablet Every 8 Hours PRN 3/1/2018     Sig - Route: Take 1 tablet by mouth Every 8 (Eight) Hours As Needed for Moderate Pain . - Oral    levETIRAcetam in NaCl 0.75% (KEPPRA) IVPB 1,000 mg 1,000 mg Every 12 Hours Scheduled 3/4/2018      "Sig - Route: Infuse 100 mL into a venous catheter Every 12 (Twelve) Hours. - Intravenous    metoprolol succinate XL (TOPROL-XL) 24 hr tablet 25 mg 25 mg Every 24 Hours Scheduled 3/2/2018     Sig - Route: Take 1 tablet by mouth Daily. - Oral    ondansetron (ZOFRAN) injection 4 mg 4 mg Every 6 Hours PRN 3/1/2018     Sig - Route: Infuse 2 mL into a venous catheter Every 6 (Six) Hours As Needed for Nausea or Vomiting. - Intravenous    Linked Group 1:  \"Or\" Linked Group Details        ondansetron (ZOFRAN) tablet 4 mg 4 mg Every 6 Hours PRN 3/1/2018     Sig - Route: Take 1 tablet by mouth Every 6 (Six) Hours As Needed for Nausea or Vomiting. - Oral    Linked Group 1:  \"Or\" Linked Group Details        ondansetron ODT (ZOFRAN-ODT) disintegrating tablet 4 mg 4 mg Every 6 Hours PRN 3/1/2018     Sig - Route: Take 1 tablet by mouth Every 6 (Six) Hours As Needed for Nausea or Vomiting. - Oral    Linked Group 1:  \"Or\" Linked Group Details        Pharmacy to Dose enoxaparin (LOVENOX)  Continuous PRN 3/2/2018     Sig - Route: Continuous As Needed for Consult. - Does not apply    Pharmacy to dose vancomycin  Continuous PRN 3/4/2018 3/14/2018    Sig - Route: Continuous As Needed for Consult. - Does not apply    potassium chloride (KLOR-CON) packet 40 mEq 40 mEq As Needed 3/4/2018     Sig - Route: Take 40 mEq by mouth As Needed (potassium replacement, see admin instructions). - Oral    potassium chloride (MICRO-K) CR capsule 40 mEq 40 mEq As Needed 3/4/2018     Sig - Route: Take 4 capsules by mouth As Needed (potassium replacement.  see admin instructions). - Oral    sodium chloride 0.9 % flush 10 mL 10 mL As Needed 3/1/2018     Sig - Route: Infuse 10 mL into a venous catheter As Needed for Line Care. - Intravenous    Cosign for Ordering: Accepted by Jordan Herron MD on 3/1/2018  4:28 PM    valsartan (DIOVAN) tablet 320 mg 320 mg Daily 3/3/2018     Sig - Route: Take 1 tablet by mouth Daily. - Oral    vancomycin 1250 mg/250 mL 0.9% NS " IVPB ([facility]) 15 mg/kg × 82.1 kg Every 12 Hours 3/4/2018 3/14/2018    Sig - Route: Infuse 250 mL into a venous catheter Every 12 (Twelve) Hours. - Intravenous    vancomycin 500 mg/100 mL 0.9% NS IVPB ([facility]) 500 mg Once 3/6/2018     Sig - Route: Infuse 100 mL into a venous catheter 1 (One) Time. - Intravenous               Physician Progress Notes         Evan Blunt MD at 3/6/2018  8:39 AM  Version 1 of 1          LOS: 5 days     Chief Complaint:  Follow-up brain abscess    Interval History:  No acute events. Tolerating antibiotics. No new complaints.    ROS: no n/v/d    Vital Signs  Temp:  [96.8 °F (36 °C)-98.3 °F (36.8 °C)] 96.8 °F (36 °C)  Heart Rate:  [67-79] 67  Resp:  [18-19] 19  BP: (161-201)/(67-87) 198/87    Physical Exam:  General: chronically ill appearing  Head: left mastoid with fluctuance, mild TTP  Eyes: L eye ptosis,  no scleral icterus, + conjunctival pallor  ENT: MM dry, large portion of left ear has been resected, no significant erythema  Neck: Supple  Cardiovascular: NR, irregular rhythm, no murmurs, rubs, or gallops; no LE edema  Respiratory: normal work of breathing on ambient air  GI: Abdomen is soft, non-tender, non-distended  : no Magana catheter present  Musculoskeletal: no joint abnormalities, normal musculature  Skin: dry skin, easy bruising  Neurological: Alert and oriented x 3,  motor strength 4/5 in all four extremities  Psychiatric: Normal mood and affect   Vasc: no cyanosis; PIV w/o erythema    Antibiotics:  •  cefepime (MAXIPIME) 2 g/100 mL 0.9% NS (mbp), 2 g, Intravenous, Q12H, Evan Blunt MD, Last Rate: 200 mL/hr at 03/05/18 2204, 2 g at 03/05/18 2204  •  vancomycin 1250 mg/250 mL 0.9% NS IVPB ([facility]), 15 mg/kg, Intravenous, Q12H, Ky English MD, 1,250 mg at 03/05/18 2012    LABS:  CRP, micro reviewed today  Lab Results   Component Value Date    WBC 8.47 03/05/2018    HGB 10.8 (L) 03/05/2018    HCT 32.7 (L) 03/05/2018    MCV 90.3 03/05/2018     " 03/05/2018     Lab Results   Component Value Date    GLUCOSE 94 03/05/2018    BUN 19 03/05/2018    CREATININE 0.70 (L) 03/05/2018    EGFRIFNONA 106 03/05/2018    EGFRIFAFRI 98 01/02/2018    BCR 27.1 (H) 03/05/2018    CO2 25.6 03/05/2018    CALCIUM 8.9 03/05/2018    PROTENTOTREF 6.9 01/02/2018    ALBUMIN 2.50 (L) 03/05/2018    LABIL2 1.0 03/01/2018    AST 34 03/01/2018    ALT 27 03/01/2018    CRP 5.38 (H) 03/05/2018   No results found for: VANCOPEAK, VANCOTROUGH, VANCORANDOM    Microbiology:  3/4 BCx: NGTD  3/1 UCx:   50k Strep mitis    Radiology (prior):  MRI brain with 4 x 2 cm left temporal abscess and small enhancing fossi elsewhere concerning for small abscesses. There is also mastoiditis and probable abscess as well as fluid at TMJ.    Assessment/Plan   1. 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 ID will follow. ANtibiotic plan d/w Dr English.         Electronically signed by Evan Blunt MD at 3/6/2018  8:41 AM                Stephon Tellez [facility] ED Pharmacist Signed Pharmacy Progress Notes Date of Service: 3/6/2018 10:33 AM        Pharmacokinetic Consult - Vancomycin Dosing (Follow-up Note)     Krzysztof Ward is a 88 y.o. male who is on day 2 pharmacy to dose vancomycin for CNS infection / MRI showing left temporal abscess/empyema with moderate edema.  Also UTI  Pharmacy dosing vancomycin per Dr. English's request.   Other antimicrobials: Cefepime 2 gram IV q12h  Goal trough: 15-20 mg/L      Current Vancomycin dose: 1250mg IV q12h (~15.5mg/kg)      Neurosurgery, ID and ENT consulted     Relevant clinical data and objective history reviewed:  182.9 cm (72\")  82.9 kg (182 lb 11.2 oz)  Body mass index is 24.78 kg/(m^2).      He has a past " medical history of H/O degenerative disc disease; Hypertension; Osteoporosis; and Squamous cell cancer of external ear, left.         Allergies as of 03/01/2018   • (No Known Allergies)      Vital Signs (last 24 hours)        03/05 0700  -  03/06 0659 03/06 0700  -  03/06 1033    Most Recent     Temp (°F) 97.4 -  98.3         96.8       96.8 (36)     Heart Rate 68 -  79         67       67     Resp     18         19       19     /73 -  175/91     164/84 -  (!) 201/83       164/84     SpO2 (%) 94 -  99          95         Estimated Creatinine Clearance: 74.8 mL/min (by C-G formula based on Cr of 0.54).     Results from last 7 days  Lab Units 03/06/18  0821 03/05/18  0548 03/04/18  1210   CREATININE mg/dL 0.54* 0.70* 0.66*         Results from last 7 days  Lab Units 03/06/18  0821 03/05/18  0548 03/04/18  0546   WBC 10*3/mm3 10.78* 8.47 10.76*      Baseline culture/source/susceptibility:   3/1 UC 50k strep mitis sens to PCN G and Vancomycin  3/4 BC prelim NG24h      Imaging:  3/4 MRI brain:  showing left temporal abscess/empyema with moderate edema - per Dr. English      Labs:  3/1 PCT 0.05  3/5 CRP 5.38      Recent Vancomycin dosing history:  3/4 2155 1250mg IV q12h (~15.5mg/kg)                        3/6 0821 Vancomycin trough 12.7 mcg/mL           Lab Results   Component Value Date     [facility] 12.70 03/06/2018      Assessment/Plan  1) Vancomycin trough level drawn prior to 4th dose (regimen without a loading dose) and collected ~30 minutes post target time = 12.7 mcg/mL.  Target 15-20 mcg/mL.  Will give additional 500mg IV x1 dose after AM dose but anticipate vancomycin accumulation given patient age.  Repeat trough again Thursday AM for a steady state level unless renal function requires plan change.    2) Will monitor serum creatinine at least every 24h for first 72h followed by at least q48 hours per dosing recommendations.    3) Encourage hydration as allowed by MD to help prevent toxic  accumulation; monitor for s/sxn of toxicity including increase in SCr and decrease in UOP.     Pharmacy will continue to follow daily while on vancomycin and adjust as needed.      Thanks, Stephon Tellez, PharmD, BCPS

## 2018-03-06 NOTE — PROGRESS NOTES
"   LOS: 5 days    Patient Care Team:  Willie Ramirez MD as PCP - General (Family Medicine)  Karla Bush MD as Consulting Physician (Radiation Oncology)  Deb Amaro MD as Consulting Physician (Ophthalmology)  Tushar Núñez MD as Consulting Physician (Otolaryngology)    Chief Complaint:    Chief Complaint   Patient presents with   • Fall     0230 this morning, son found this morning.      Follow UP Hypertension and electrolyte abnormalities.  Subjective     Interval History:     Family and patient DW Dr. English this am.  Going to palliative care floor with plan to talk with hospice about home care. Patient has living will.  Wants no surgical intervention. Sleeping now.     Review of Systems:   Daughter reports sleeping more today.  Eating ok. Does not complain of thirst.     Objective     Vital Signs  Temp:  [96.8 °F (36 °C)-98.3 °F (36.8 °C)] 96.8 °F (36 °C)  Heart Rate:  [67-79] 67  Resp:  [18-19] 19  BP: (161-201)/(67-87) 164/84    Flowsheet Rows         First Filed Value    Admission Height  177.8 cm (70\") Documented at 03/01/2018 1206    Admission Weight  81.6 kg (180 lb) Documented at 03/01/2018 1206          I/O this shift:  In: 360 [P.O.:240; I.V.:20; IV Piggyback:100]  Out: 450 [Urine:450]  I/O last 3 completed shifts:  In: 1430 [P.O.:730; IV Piggyback:700]  Out: 1250 [Urine:1250]    Intake/Output Summary (Last 24 hours) at 03/06/18 1255  Last data filed at 03/06/18 1223   Gross per 24 hour   Intake              830 ml   Output             1100 ml   Net             -270 ml       Physical Exam:  Sleeping soundly.  I did not awaken him.  See discussion below.      Results Review:      Results from last 7 days  Lab Units 03/06/18  0821 03/05/18  2140 03/05/18  0548 03/04/18  1600 03/04/18  1210  03/01/18  1243   SODIUM mmol/L 127*  --  126* 124* 125*  < > 130*   POTASSIUM mmol/L 3.8 4.5 3.8  --  3.5  < > 4.6   CHLORIDE mmol/L 88*  --  91*  --  88*  < > 91*   CO2 mmol/L 27.6  --  25.6  --  " 25.2  < > 27.0   BUN mg/dL 18  --  19  --  17  < > 16   CREATININE mg/dL 0.54*  --  0.70*  --  0.66*  < > 0.71*   CALCIUM mg/dL 8.9  --  8.9  --  8.7  < > 10.1   BILIRUBIN mg/dL  --   --   --   --   --   --  0.9   ALK PHOS U/L  --   --   --   --   --   --  107   ALT (SGPT) U/L  --   --   --   --   --   --  27   AST (SGOT) U/L  --   --   --   --   --   --  34   GLUCOSE mg/dL 114*  --  94  --  120*  < > 116*   < > = values in this interval not displayed.    Estimated Creatinine Clearance: 74.8 mL/min (by C-G formula based on Cr of 0.54).      Results from last 7 days  Lab Units 03/06/18  0821 03/05/18  2140 03/05/18  0548 03/01/18  1243   MAGNESIUM mg/dL  --  1.8 1.9 1.8   PHOSPHORUS mg/dL 2.7  --  3.2  --          Results from last 7 days  Lab Units 03/05/18  0548   URIC ACID mg/dL 2.3*         Results from last 7 days  Lab Units 03/06/18  0821 03/05/18  0548 03/04/18  0546 03/03/18  0546 03/02/18  0523   WBC 10*3/mm3 10.78* 8.47 10.76* 11.25* 8.38   HEMOGLOBIN g/dL 11.8* 10.8* 11.2* 11.8* 10.6*   PLATELETS 10*3/mm3 333 281 322 332 321               Imaging Results (last 24 hours)     Procedure Component Value Units Date/Time    MRI Brain With & Without Contrast [[id number]] Collected:  03/04/18 2016     Updated:  03/06/18 0741    Narrative:       MRI EXAMINATION OF THE BRAIN WITH AND WITHOUT CONTRAST     HISTORY: Stroke.     TECHNIQUE: A MRI examination of the brain was performed before and after  the intravenous administration of contrast. No prior MRI of the brain is  available for comparison.     FINDINGS: There is moderate diffuse atrophy with secondary  ventriculomegaly.     The axial T2 sequence demonstrates increased signal intensity involving  the mastoid air cells on the left. There is intermediate signal on the  T2 sequence with enhancement involving the mastoid air cells more  superiorly extending to and involving the temporal bone. The appearance  is nonspecific. This may be secondary to aggressive  osteomyelitis,  however tumor cannot be excluded. This area measures approximately 3.3  cm transverse dimension and 2.8 cm in AP dimension. There is edema  appreciated involving the inferior aspect of the left temporal lobe.  There is a ring-enhancing focus involving the inferior aspect of the  left temporal lobe. This demonstrates increased signal intensity on the  diffusion sequence and is worrisome for empyema or abscess. On the  coronal T1 postcontrast sequence the ring enhancement appears to be  ocular with the larger component (the component evident on the axial  diffusion sequence) measuring 12 mm in transverse dimension, 6 mm in  craniocaudal dimension and 9 mm in AP dimension. More anterolaterally  there is a thin peripherally enhancing structure measuring approximately  5 mm in transverse dimension and 2.5 mm in craniocaudal dimension.  Posterolaterally there is a peripheral enhancement worrisome for empyema  measuring 1.5 mm in craniocaudal dimension and 7 mm in transverse  dimension.     There is extensive enhancement of the temporomandibular joint and  adjacent soft tissues. A fluid collection is noted multilocular anterior  and inferior to the temporomandibular joint on the left.       Impression:       There is edema involving the inferior aspect of the left temporal lobe  with the area of edema involving the cortex and subcortical white  matter. This area measures 4.1 cm x  2.3 cm in AP and transverse  dimensions respectively. There are small peripherally enhancing foci  involving the floor of the left middle cranial fossa worrisome for  empyemas. There is a larger collection more medially which may represent  a large empyema although a superficial cortical abscess involving the  inferior aspect of the left temporal lobe cannot be excluded. An  intraparenchymal abscess is less likely. There is extensive enhancement  involving the mastoid air cells on the left corresponding to the lytic  process noted  on the CT examination of 03/01/2018. The appearance is  nonspecific. This may represent an extensive and aggressive mastoiditis,  although tumor with superimposed infection could not be excluded. There  is enhancement involving the temporomandibular joint on the left with  fluid anterior to the left temporomandibular joint. An infected  collection could not be excluded.     The above information was called to and discussed with Dr. Muir on  3/4/2018 at 1951 hours.     This report was finalized on 3/6/2018 7:38 AM by Dr. Elijah Chang MD.             artificial tears  Left Eye Nightly   aspirin 81 mg Oral Daily   atorvastatin 10 mg Oral Daily   cefepime 2 g Intravenous Q12H   enoxaparin 40 mg Subcutaneous Q24H   famotidine 20 mg Oral Daily   levETIRAcetam 1,000 mg Intravenous Q12H   metoprolol succinate XL 25 mg Oral Q24H   valsartan 320 mg Oral Daily   vancomycin 15 mg/kg Intravenous Q12H   vancomycin 500 mg Intravenous Once       Pharmacy to Dose enoxaparin (LOVENOX)    Pharmacy to dose vancomycin        Medication Review:   Current Facility-Administered Medications   Medication Dose Route Frequency Provider Last Rate Last Dose   • acetaminophen (TYLENOL) tablet 650 mg  650 mg Oral Q4H PRN Fern Hart MD   650 mg at 03/06/18 0746   • aluminum-magnesium hydroxide-simethicone (MAALOX MAX) 400-400-40 MG/5ML suspension 15 mL  15 mL Oral Q6H PRN Fern Hart MD       • artificial tears (LUBRIFRESH P.M.) ophthalmic ointment   Left Eye Nightly AIXA Duran       • aspirin EC tablet 81 mg  81 mg Oral Daily Eddy العلي MD   81 mg at 03/06/18 0751   • atorvastatin (LIPITOR) tablet 10 mg  10 mg Oral Daily Fern Hart MD   10 mg at 03/06/18 0751   • bisacodyl (DULCOLAX) EC tablet 5 mg  5 mg Oral Daily PRN Fern Hart MD       • cefepime (MAXIPIME) 2 g/100 mL 0.9% NS (mbp)  2 g Intravenous Q12H Evan Blunt  mL/hr at 03/06/18 1020 2 g at 03/06/18 1020    • enoxaparin (LOVENOX) syringe 40 mg  40 mg Subcutaneous Q24H Eddy العلي MD   40 mg at 03/06/18 0742   • famotidine (PEPCID) tablet 20 mg  20 mg Oral Daily Fern Hart MD   20 mg at 03/06/18 0747   • Glycerin-Hypromellose- (ARTIFICIAL TEARS) 0.2-0.2-1 % ophthalmic solution solution 1 drop  1 drop Left Eye Q2H PRN AIXA Duran       • hydrALAZINE (APRESOLINE) tablet 25 mg  25 mg Oral Q8H PRN Ky English MD       • HYDROcodone-acetaminophen (NORCO) 7.5-325 MG per tablet 1 tablet  1 tablet Oral Q8H PRN Fern Hart MD   1 tablet at 03/04/18 2040   • levETIRAcetam in NaCl 0.75% (KEPPRA) IVPB 1,000 mg  1,000 mg Intravenous Q12H Ky English MD   1,000 mg at 03/06/18 0800   • metoprolol succinate XL (TOPROL-XL) 24 hr tablet 25 mg  25 mg Oral Q24H Eddy العلي MD   25 mg at 03/06/18 0732   • ondansetron (ZOFRAN) tablet 4 mg  4 mg Oral Q6H PRN Fern Hart MD        Or   • ondansetron ODT (ZOFRAN-ODT) disintegrating tablet 4 mg  4 mg Oral Q6H PRN Fern Hart MD        Or   • ondansetron (ZOFRAN) injection 4 mg  4 mg Intravenous Q6H PRN Fern Hart MD       • Pharmacy to Dose enoxaparin (LOVENOX)   Does not apply Continuous PRN Eddy العلي MD       • Pharmacy to dose vancomycin   Does not apply Continuous PRN Ky English MD       • potassium chloride (KLOR-CON) packet 40 mEq  40 mEq Oral PRN Cecil White MD       • potassium chloride (MICRO-K) CR capsule 40 mEq  40 mEq Oral PRN Cecil White MD   40 mEq at 03/04/18 8182   • sodium chloride 0.9 % flush 10 mL  10 mL Intravenous PRN Triage Protocol Emergency, MD       • valsartan (DIOVAN) tablet 320 mg  320 mg Oral Daily Ky English MD   320 mg at 03/06/18 0732   • vancomycin 1250 mg/250 mL 0.9% NS IVPB ([facility])  15 mg/kg Intravenous Q12H Ky English MD   1,250 mg at 03/06/18 0947   • vancomycin 500 mg/100 mL 0.9% NS IVPB  ([facility])  500 mg Intravenous Once Ky English MD           Assessment/Plan       Active Problems:    Hypertension    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  Hyponatremia, acute,   euvolemic: probably multifactorial with poor solute intake, increased water intake, and possibly malignancy-driven paraneoplastic process.  Urine studies c/w SIADH.  TSH and cortisol fine.  On 1500 cc by mouth fluid restriction, sodium is slowly coming up.    2.  SCC left ear and auditory canal with recurrence and suspected invasion, per ENT and neurosurgery.  3.  Left facial droop  4.  Atrial flutter  5.  Anemia.  6.  Leukocytosis  7.  Hypokalemia, replaced.  8. HTN.  Increase metoprolol to 37.5.    Moving to palliative to establish goals of care. I talked with his daughter at length.  We decided to stop lab draws. I told her that I would be fine with stopping the fluid restriction, too if he seems thirsty or asks for more fluids. I will sign off, but please call with questions.     Alondra Ellis MD  03/06/18  12:55 PM

## 2018-03-06 NOTE — SIGNIFICANT NOTE
03/06/18 1412   Rehab Treatment   Discipline physical therapist   Treatment Not Performed other (see comments)  (Per RNApryl, pt with high BP today. ALso transferring to palliative care to determine goals of care. Will hold PT today and follow up tomorrow pending pt/family wishes for care.)   Recommendation   PT - Next Appointment 03/07/18

## 2018-03-06 NOTE — PROGRESS NOTES
Discharge Planning Assessment  [facility]     Patient Name: Krzysztof Ward  MRN: [id number]  Today's Date: 3/6/2018    Admit Date: 3/1/2018          Discharge Needs Assessment     None            Discharge Plan       03/06/18 1514    Case Management/Social Work Plan    Additional Comments The patient was transferred to [facility] from [facility] on 3/6/18 @ 14:30. The patient is palliative. [facility] consult in [facility]. CCP will follow for any needs that may arise. DEJUAN Bashir RN, CCP.         Discharge Placement     Facility/Agency Request Status Selected? Address Phone Number Fax Number    [facility] Pending - Request Sent     [address] [address] [phone]                Demographic Summary     None            Functional Status     None            Psychosocial     None            Abuse/Neglect     None            Legal     None            Substance Abuse     None            Patient Forms     None          Padmini Bashir, JERICA

## 2018-03-06 NOTE — PROGRESS NOTES
LOS: 5 days     Chief Complaint:  Follow-up brain abscess    Interval History:  No acute events. Tolerating antibiotics. No new complaints.    ROS: no n/v/d    Vital Signs  Temp:  [96.8 °F (36 °C)-98.3 °F (36.8 °C)] 96.8 °F (36 °C)  Heart Rate:  [67-79] 67  Resp:  [18-19] 19  BP: (161-201)/(67-87) 198/87    Physical Exam:  General: chronically ill appearing  Head: left mastoid with fluctuance, mild TTP  Eyes: L eye ptosis,  no scleral icterus, + conjunctival pallor  ENT: MM dry, large portion of left ear has been resected, no significant erythema  Neck: Supple  Cardiovascular: NR, irregular rhythm, no murmurs, rubs, or gallops; no LE edema  Respiratory: normal work of breathing on ambient air  GI: Abdomen is soft, non-tender, non-distended  : no Magana catheter present  Musculoskeletal: no joint abnormalities, normal musculature  Skin: dry skin, easy bruising  Neurological: Alert and oriented x 3,  motor strength 4/5 in all four extremities  Psychiatric: Normal mood and affect   Vasc: no cyanosis; PIV w/o erythema    Antibiotics:  •  cefepime (MAXIPIME) 2 g/100 mL 0.9% NS (mbp), 2 g, Intravenous, Q12H, Evan Blunt MD, Last Rate: 200 mL/hr at 03/05/18 2204, 2 g at 03/05/18 2204  •  vancomycin 1250 mg/250 mL 0.9% NS IVPB ([facility]), 15 mg/kg, Intravenous, Q12H, Ky English MD, 1,250 mg at 03/05/18 2012    LABS:  CRP, micro reviewed today  Lab Results   Component Value Date    WBC 8.47 03/05/2018    HGB 10.8 (L) 03/05/2018    HCT 32.7 (L) 03/05/2018    MCV 90.3 03/05/2018     03/05/2018     Lab Results   Component Value Date    GLUCOSE 94 03/05/2018    BUN 19 03/05/2018    CREATININE 0.70 (L) 03/05/2018    EGFRIFNONA 106 03/05/2018    EGFRIFAFRI 98 01/02/2018    BCR 27.1 (H) 03/05/2018    CO2 25.6 03/05/2018    CALCIUM 8.9 03/05/2018    PROTENTOTREF 6.9 01/02/2018    ALBUMIN 2.50 (L) 03/05/2018    LABIL2 1.0 03/01/2018    AST 34 03/01/2018    ALT 27 03/01/2018    CRP 5.38 (H) 03/05/2018    No results found for: VANCOPEAK, VANCOTROUGH, VANCORANDOM    Microbiology:  3/4 BCx: NGTD  3/1 UCx:  50k Strep mitis    Radiology (prior):  MRI brain with 4 x 2 cm left temporal abscess and small enhancing fossi elsewhere concerning for small abscesses. There is also mastoiditis and probable abscess as well as fluid at TMJ.    Assessment/Plan   1. 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 ID will follow. ANtibiotic plan d/w Dr Ramos.

## 2018-03-07 NOTE — PROGRESS NOTES
Discharge Planning Assessment  [facility]     Patient Name: Krzysztof Ward  MRN: [id number]  Today's Date: 3/7/2018    Admit Date: 3/1/2018          Discharge Needs Assessment     None            Discharge Plan       03/07/18 1707    Case Management/Social Work Plan    Additional Comments Spoke with the family and the discharge plan is for the patient to go to [facility] ( PT, OT and speech therapy) at discharge. Placed call to [facility] and asked Cyn to start the pre-cert for a skilled bed. Lucy/JERICA will place a note to physical therapy to please continue working with the patient. CCP will follow for any needs that may arise. DEJUAN Bashir RN, CCP      03/07/18 1543    Case Management/Social Work Plan    Additional Comments Vianney/Cornelia met with the family and did an Explanation of [facility]. At this time the family would like to meet with each other and review the financials of the patient and all their options. Son is POA but not sure of his father's financial situation. [facility] is following. Vianney will follow up in the  on family decision. DEJUAN Bashir RN, CCP        Discharge Placement     Facility/Agency Request Status Selected? Address Phone Number Fax Number    [facility] Pending - Request Sent     [address] [address] [phone]                Demographic Summary     None            Functional Status     None            Psychosocial     None            Abuse/Neglect     None            Legal     None            Substance Abuse     None            Patient Forms     None          Padmini Bashir RN

## 2018-03-07 NOTE — PLAN OF CARE
Problem: Patient Care Overview (Adult)  Goal: Plan of Care Review  Outcome: Ongoing (interventions implemented as appropriate)   03/07/18 2544   Coping/Psychosocial Response Interventions   Plan Of Care Reviewed With patient;family   Patient Care Overview   Progress no change   Outcome Evaluation   Outcome Summary/Follow up Plan pain meds given x1. placed latif this morning for comfort and retention. patient's family would like for him to go to [facility] in the next couple days. Please make sure PT sees patient tomorrow. will continue to monitor patient and treat according to MD orders.       Problem: Pain, Chronic (Adult)  Goal: Acceptable Pain Control/Comfort Level  Outcome: Ongoing (interventions implemented as appropriate)      Problem: Fall Risk (Adult)  Goal: Absence of Falls  Outcome: Ongoing (interventions implemented as appropriate)      Problem: Infection, Risk/Actual (Adult)  Goal: Infection Prevention/Resolution  Outcome: Ongoing (interventions implemented as appropriate)      Problem: Pressure Ulcer Risk (Vimal Scale) (Adult,Obstetrics,Pediatric)  Goal: Identify Related Risk Factors and Signs and Symptoms  Outcome: Outcome(s) achieved Date Met: 03/07/18    Goal: Skin Integrity  Outcome: Ongoing (interventions implemented as appropriate)

## 2018-03-07 NOTE — PROGRESS NOTES
Will continue vancomycin and cefepime while awaiting hospice evaluation. If antibiotics are desired at discharge with plans to follow-up with [facility] surgery, I would recommend amoxicillin-clavulanate 500/125 mg PO q8h until the date of follow-up. However, I would add that if quality of life is the goal without plans for any surgical evaluation at [facility], I would not necessarily recommend antibiotics as they will not be curative without surgery but may produce unwanted side effects.

## 2018-03-07 NOTE — PROGRESS NOTES
Discharge Planning Assessment  [facility]     Patient Name: Krzysztof Ward  MRN: [id number]  Today's Date: 3/7/2018    Admit Date: 3/1/2018          Discharge Needs Assessment     None            Discharge Plan       03/07/18 1546    Case Management/Social Work Plan    Additional Comments Vianney/Cornelia met with the family and did an Explanation of [facility]. At this time the family would like to meet with each other and review the financials of the patient and all their options. Son is POA but not sure of his father's financial situation. [facility] is following. Vianney will follow up in the  on family decision. DEJUAN Bashir RN, CCP        Discharge Placement     Facility/Agency Request Status Selected? Address Phone Number Fax Number    [facility] Pending - Request Sent     [address] [address] [phone]                Demographic Summary     None            Functional Status     None            Psychosocial     None            Abuse/Neglect     None            Legal     None            Substance Abuse     None            Patient Forms     None          Padmini Bashir RN

## 2018-03-07 NOTE — PROGRESS NOTES
Discharge Planning Assessment  [facility]     Patient Name: Krzysztof Ward  MRN: [id number]  Today's Date: 3/7/2018    Admit Date: 3/1/2018          Discharge Needs Assessment     None            Discharge Plan       03/07/18 1244    Case Management/Social Work Plan    Additional Comments [facility] to see and evaluate on 3/7/18 @ 15:00. DEJUAN Bashir RN, CCP        Discharge Placement     Facility/Agency Request Status Selected? Address Phone Number Fax Number    [facility] Pending - Request Sent     [address] [address] [phone]                Demographic Summary     None            Functional Status     None            Psychosocial     None            Abuse/Neglect     None            Legal     None            Substance Abuse     None            Patient Forms     None          Padmini Bashir, RN

## 2018-03-07 NOTE — CONSULTS
Our  met with family and explained hospice services. They are looking at different options including services at home with paid caregivers, vs [facility] (private-pay) w/Hospice following vs [facility] for PT. Today we completed initial assessment and preadmission. Family wants more time to make a decision, and Stefani is working on possible discharge to [facility] for PT if patient qualifies. Please let us know if our services are needed, or if family has more questions. Thank you for the referral.     Cele Amezquita RN  [facility]  [phone]

## 2018-03-07 NOTE — PLAN OF CARE
Problem: Patient Care Overview (Adult)  Goal: Plan of Care Review  Outcome: Ongoing (interventions implemented as appropriate)   03/07/18 0450   Coping/Psychosocial Response Interventions   Plan Of Care Reviewed With patient   Patient Care Overview   Progress no change   Outcome Evaluation   Outcome Summary/Follow up Plan confused, turned-but also moves self in bed a lot, cont/incont urine, plan is to go home with hospice     Goal: Adult Individualization and Mutuality  Outcome: Ongoing (interventions implemented as appropriate)    Goal: Discharge Needs Assessment  Outcome: Ongoing (interventions implemented as appropriate)      Problem: Pain, Chronic (Adult)  Goal: Acceptable Pain Control/Comfort Level  Outcome: Ongoing (interventions implemented as appropriate)      Problem: Fall Risk (Adult)  Goal: Absence of Falls  Outcome: Ongoing (interventions implemented as appropriate)      Problem: Infection, Risk/Actual (Adult)  Goal: Infection Prevention/Resolution  Outcome: Ongoing (interventions implemented as appropriate)

## 2018-03-07 NOTE — PROGRESS NOTES
LOS: 6 days   Patient Care Team:  Willie Ramirez MD as PCP - General (Family Medicine)  Karla Bush MD as Consulting Physician (Radiation Oncology)  Deb Amaro MD as Consulting Physician (Ophthalmology)  Tushar Núñez MD as Consulting Physician (Otolaryngology)    Chief Complaint   Patient presents with   • Fall     0230 this morning, son found this morning.          Subjective   Denies any SOA or pain. Has been managing to eat well at breakfast & lunch but not at supper.     Objective     Vital Signs  Temp:  [97 °F (36.1 °C)-98.9 °F (37.2 °C)] 98.9 °F (37.2 °C)  Heart Rate:  [67-74] 74  Resp:  [16] 16  BP: (174-195)/(85-95) 174/85    Physical Exam:  WDWN male in NAD. Alert & oriented.  Left facial droop present  Lungs clear with equal BS  Heart irreg  Abd soft, NT, BS+  Ext no edema.  Skin warm & dry       Results Review:     I reviewed the patient's new clinical results.    Medication Review:       LABS    Results from last 7 days  Lab Units 03/06/18  0821 03/05/18  2140 03/05/18  0548 03/04/18  1600 03/04/18  1210   SODIUM mmol/L 127*  --  126* 124* 125*   POTASSIUM mmol/L 3.8 4.5 3.8  --  3.5   CHLORIDE mmol/L 88*  --  91*  --  88*   CO2 mmol/L 27.6  --  25.6  --  25.2   BUN mg/dL 18  --  19  --  17   CREATININE mg/dL 0.54*  --  0.70*  --  0.66*   GLUCOSE mg/dL 114*  --  94  --  120*   CALCIUM mg/dL 8.9  --  8.9  --  8.7       Results from last 7 days  Lab Units 03/06/18  0821 03/05/18  0548 03/04/18  0546   WBC 10*3/mm3 10.78* 8.47 10.76*   HEMOGLOBIN g/dL 11.8* 10.8* 11.2*   HEMATOCRIT % 34.8* 32.7* 33.1*   PLATELETS 10*3/mm3 333 281 322               Assessment/Plan     Active Problems:    Hypertension    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 Plan is to do rehab, transition to Hospice if he doesn't improve.    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

## 2018-03-08 NOTE — PROGRESS NOTES
LOS: 7 days     Chief Complaint:  Follow-up brain abscess    Interval History:  No acute events. Noted plans for rehab rather than immediate hospice. Will clarify with primary team.    ROS: no n/v/d    Vital Signs  Temp:  [97.3 °F (36.3 °C)-99.7 °F (37.6 °C)] 97.3 °F (36.3 °C)  Heart Rate:  [42-53] 53  Resp:  [16] 16  BP: (128-148)/(67-75) 128/75    Physical Exam:  General: chronically ill appearing, resting  Head: left mastoid with fluctuance, mild TTP  Eyes: L eye ptosis,  no scleral icterus, + conjunctival pallor  ENT: MM dry, large portion of left ear has been resected, no significant erythema  Cardiovascular: NR, irregular rhythm  Respiratory: normal work of breathing on ambient air  GI: Abdomen is non-distended  : no Magana catheter present  Musculoskeletal: no joint abnormalities, normal musculature  Skin: dry skin, easy bruising  Vasc: no cyanosis; PIV w/o erythema    Antibiotics:  •  cefepime (MAXIPIME) 2 g/100 mL 0.9% NS (mbp), 2 g, Intravenous, Q12H, Evan Blunt MD, Last Rate: 200 mL/hr at 03/07/18 2235, 2 g at 03/07/18 2235  •  vancomycin 1250 mg/250 mL 0.9% NS IVPB ([facility]), 15 mg/kg, Intravenous, Q12H, Ky English MD, 1,250 mg at 03/07/18 2109    LABS:  CBC, BMP, VTr, micro reviewed today  Lab Results   Component Value Date    WBC 10.78 (H) 03/06/2018    HGB 11.8 (L) 03/06/2018    HCT 34.8 (L) 03/06/2018    MCV 89.0 03/06/2018     03/06/2018     Lab Results   Component Value Date    GLUCOSE 114 (H) 03/06/2018    BUN 18 03/06/2018    CREATININE 0.54 (L) 03/06/2018    EGFRIFNONA 144 03/06/2018    EGFRIFAFRI 98 01/02/2018    BCR 33.3 (H) 03/06/2018    CO2 27.6 03/06/2018    CALCIUM 8.9 03/06/2018    PROTENTOTREF 6.9 01/02/2018    ALBUMIN 3.10 (L) 03/06/2018    LABIL2 1.0 03/01/2018    AST 34 03/01/2018    ALT 27 03/01/2018    CRP 5.38 (H) 03/05/2018     Lab Results   Component Value Date    [facility] 12.70 03/06/2018       Microbiology:  3/4 BCx: negative  3/1 UCx:  50k  Strep mitis    Radiology (prior):  MRI brain with 4 x 2 cm left temporal abscess and small enhancing fossi elsewhere concerning for small abscesses. There is also mastoiditis and probable abscess as well as fluid at TMJ.    Assessment/Plan   1. 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 He will need to be monitored for diarrhea while on this regimen.    Thank you for allowing me to be involved in the care of this patient. Infectious diseases will sign off at this time with antibiotics plan in place but please call me at [phone] if any further questions.

## 2018-03-08 NOTE — PLAN OF CARE
Problem: Patient Care Overview (Adult)  Goal: Plan of Care Review  Outcome: Ongoing (interventions implemented as appropriate)   03/08/18 0446   Coping/Psychosocial Response Interventions   Plan Of Care Reviewed With patient   Patient Care Overview   Progress no change   Outcome Evaluation   Outcome Summary/Follow up Plan f/c with yellow urine, med x1 for discomfort and rest, drank ensure, iv abt given     Goal: Adult Individualization and Mutuality  Outcome: Ongoing (interventions implemented as appropriate)    Goal: Discharge Needs Assessment  Outcome: Ongoing (interventions implemented as appropriate)      Problem: Pain, Chronic (Adult)  Goal: Acceptable Pain Control/Comfort Level  Outcome: Ongoing (interventions implemented as appropriate)      Problem: Fall Risk (Adult)  Goal: Absence of Falls  Outcome: Ongoing (interventions implemented as appropriate)      Problem: Infection, Risk/Actual (Adult)  Goal: Infection Prevention/Resolution  Outcome: Ongoing (interventions implemented as appropriate)      Problem: Pressure Ulcer Risk (Vimal Scale) (Adult,Obstetrics,Pediatric)  Goal: Skin Integrity  Outcome: Ongoing (interventions implemented as appropriate)

## 2018-03-08 NOTE — PLAN OF CARE
Problem: Patient Care Overview (Adult)  Goal: Plan of Care Review  Outcome: Ongoing (interventions implemented as appropriate)   03/08/18 3765   Coping/Psychosocial Response Interventions   Plan Of Care Reviewed With patient   Patient Care Overview   Progress no change   Outcome Evaluation   Outcome Summary/Follow up Plan urine blood tinged early today, 1 dose pain medicine given, slept well today, asked PT to see pt but no therapy done. will monitor     Goal: Adult Individualization and Mutuality  Outcome: Ongoing (interventions implemented as appropriate)      Problem: Pain, Chronic (Adult)  Goal: Acceptable Pain Control/Comfort Level  Outcome: Ongoing (interventions implemented as appropriate)      Problem: Fall Risk (Adult)  Goal: Absence of Falls  Outcome: Ongoing (interventions implemented as appropriate)      Problem: Infection, Risk/Actual (Adult)  Goal: Infection Prevention/Resolution  Outcome: Ongoing (interventions implemented as appropriate)      Problem: Pressure Ulcer Risk (Vimal Scale) (Adult,Obstetrics,Pediatric)  Goal: Skin Integrity  Outcome: Ongoing (interventions implemented as appropriate)

## 2018-03-08 NOTE — PROGRESS NOTES
"Pharmacokinetic Evaluation - Vancomycin    Krzysztof Ward is a 88 y.o. male on vancomycin pharmacy to dose.  MRN: [id number]  : 1929    Day of vancomycin therapy: 4  Indication: cns infection  Consulted by: Dr. English; ID consulted   Goal trough: 15-20    Current dose: 1250mg q12h  Other antimicrobials: cefepime 2g q12h    Blood pressure 128/75, pulse 53, temperature 97.3 °F (36.3 °C), temperature source Oral, resp. rate 16, height 182.9 cm (72\"), weight 82.9 kg (182 lb 11.2 oz), SpO2 96 %.    Results from last 7 days  Lab Units 18  0821 18  0548 18  1210   CREATININE mg/dL 0.54* 0.70* 0.66*     Estimated Creatinine Clearance: 74.8 mL/min (by C-G formula based on Cr of 0.54).    Results from last 7 days  Lab Units 18  0821 18  0548 18  0546   WBC 10*3/mm3 10.78* 8.47 10.76*   HEMOGLOBIN g/dL 11.8* 10.8* 11.2*   HEMATOCRIT % 34.8* 32.7* 33.1*   PLATELETS 10*3/mm3 333 281 322     Baseline culture/source/susceptibility:   3/1 UC 50k strep mitis sens to PCN G and Vancomycin  3/4 BC NGTD      Imaging:  3/4 MRI brain:  showing left temporal abscess/empyema with moderate edema - per Dr. English      Labs:  3/1 PCT 0.05  3/5 CRP 5.38      Recent Vancomycin dosing history:  3/4 2155 1250mg IV q12h (~15.5mg/kg)                        3/6 0821 Vancomycin trough 12.7 mcg/mL - 500 mg additional bolus 3/6 1310          Assessment:  Renal function had been stable. No new labs on since 3/6. Due to have a trough tomorrow @ 0830. Not sure of plan for labs since palliative care vs. rehab has been discussed. Patient however has not had a trough since 3/6 so it would be beneficial we can draw a trough in the am. Will order trough and Scr for tomorrow morning. Continue the same today.     Plan:  1) continue vancomycin 1250 mg every 12 hours.  2) Next trough on 3/9 at 0830   3) Monitor for uop and scr. Scr in am .    Stephon Stone RPH    "

## 2018-03-08 NOTE — PROGRESS NOTES
LOS: 7 days   Patient Care Team:  Willie Ramirez MD as PCP - General (Family Medicine)  Karla Bush MD as Consulting Physician (Radiation Oncology)  Deb Amaro MD as Consulting Physician (Ophthalmology)  Tushar Núñez MD as Consulting Physician (Otolaryngology)    Chief Complaint   Patient presents with   • Fall     0230 this morning, son found this morning.          Subjective   Doing ok. No nausea. Trying to eat. No SOA    Objective     Vital Signs  Temp:  [97.3 °F (36.3 °C)] 97.3 °F (36.3 °C)  Heart Rate:  [53] 53  Resp:  [16] 16  BP: (128)/(75) 128/75    Physical Exam:  WDWN male in NAD. Alert & oriented.  Left facial droop present  Lungs clear with equal BS  Heart irreg  Abd soft, NT, BS+  Ext no edema.  Skin warm & dry       Results Review:     I reviewed the patient's new clinical results.    Medication Review:       LABS    Results from last 7 days  Lab Units 03/06/18  0821 03/05/18  2140 03/05/18  0548 03/04/18  1600 03/04/18  1210   SODIUM mmol/L 127*  --  126* 124* 125*   POTASSIUM mmol/L 3.8 4.5 3.8  --  3.5   CHLORIDE mmol/L 88*  --  91*  --  88*   CO2 mmol/L 27.6  --  25.6  --  25.2   BUN mg/dL 18  --  19  --  17   CREATININE mg/dL 0.54*  --  0.70*  --  0.66*   GLUCOSE mg/dL 114*  --  94  --  120*   CALCIUM mg/dL 8.9  --  8.9  --  8.7       Results from last 7 days  Lab Units 03/06/18  0821 03/05/18  0548 03/04/18  0546   WBC 10*3/mm3 10.78* 8.47 10.76*   HEMOGLOBIN g/dL 11.8* 10.8* 11.2*   HEMATOCRIT % 34.8* 32.7* 33.1*   PLATELETS 10*3/mm3 333 281 322               Assessment/Plan     Active Problems:    Hypertension    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

## 2018-03-09 NOTE — PLAN OF CARE
Problem: Patient Care Overview (Adult)  Goal: Plan of Care Review  Outcome: Ongoing (interventions implemented as appropriate)  Continue symptom management and comfort care.   03/08/18 1746 03/08/18 2256 03/09/18 0338   Coping/Psychosocial Response Interventions   Plan Of Care Reviewed With --  patient --    Patient Care Overview   Progress no change --  --    Outcome Evaluation   Outcome Summary/Follow up Plan --  --  Patient rested well overnight. Pain medication given once per patient request. Urine continues to be blood tinged. IV abx continued. Vanc trough due in AM.     Goal: Adult Individualization and Mutuality  Outcome: Ongoing (interventions implemented as appropriate)    Goal: Discharge Needs Assessment  Outcome: Ongoing (interventions implemented as appropriate)      Problem: Fall Risk (Adult)  Goal: Absence of Falls  Outcome: Ongoing (interventions implemented as appropriate)      Problem: Pressure Ulcer Risk (Vimal Scale) (Adult,Obstetrics,Pediatric)  Goal: Skin Integrity  Outcome: Ongoing (interventions implemented as appropriate)      Problem: Dying Patient, Actively (Adult)  Goal: Identify Related Risk Factors and Signs and Symptoms  Outcome: Ongoing (interventions implemented as appropriate)    Goal: Comfort/Pain Control  Outcome: Ongoing (interventions implemented as appropriate)    Goal: Dying Process, Peace and Dignity  Outcome: Ongoing (interventions implemented as appropriate)

## 2018-03-09 NOTE — PROGRESS NOTES
LOS: 8 days   Patient Care Team:  Willie Ramirez MD as PCP - General (Family Medicine)  Karla Bush MD as Consulting Physician (Radiation Oncology)  Deb Amaro MD as Consulting Physician (Ophthalmology)  Tushar Núñez MD as Consulting Physician (Otolaryngology)    Chief Complaint   Patient presents with   • Fall     0230 this morning, son found this morning.          Subjective   Doing ok. No nausea. Trying to eat. No SOA. No changes    Objective     Vital Signs  Temp:  [97.1 °F (36.2 °C)] 97.1 °F (36.2 °C)  Heart Rate:  [58] 58  Resp:  [18] 18  BP: (173)/(67) 173/67    Physical Exam:  WDWN male in NAD. Alert & oriented.  Left facial droop present  Lungs clear with equal BS  Heart irreg  Abd soft, NT, BS+  Ext no edema.  Skin warm & dry       Results Review:     I reviewed the patient's new clinical results.    Medication Review:       LABS    Results from last 7 days  Lab Units 03/09/18  0843 03/06/18  0821 03/05/18  2140 03/05/18  0548 03/04/18  1600 03/04/18  1210   SODIUM mmol/L  --  127*  --  126* 124* 125*   POTASSIUM mmol/L  --  3.8 4.5 3.8  --  3.5   CHLORIDE mmol/L  --  88*  --  91*  --  88*   CO2 mmol/L  --  27.6  --  25.6  --  25.2   BUN mg/dL  --  18  --  19  --  17   CREATININE mg/dL 0.68* 0.54*  --  0.70*  --  0.66*   GLUCOSE mg/dL  --  114*  --  94  --  120*   CALCIUM mg/dL  --  8.9  --  8.9  --  8.7       Results from last 7 days  Lab Units 03/06/18  0821 03/05/18  0548 03/04/18  0546   WBC 10*3/mm3 10.78* 8.47 10.76*   HEMOGLOBIN g/dL 11.8* 10.8* 11.2*   HEMATOCRIT % 34.8* 32.7* 33.1*   PLATELETS 10*3/mm3 333 281 322               Assessment/Plan     Active Problems:    Hypertension    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

## 2018-03-09 NOTE — PROGRESS NOTES
"Pharmacokinetic Evaluation - Vancomycin    Krzysztof Ward is a 88 y.o. male on vancomycin pharmacy to dose.  MRN: [id number]  : 1929    Day of vancomycin therapy: 5  Indication: cns infection  Consulted by: Dr. English; ID consulted   Goal trough: 15-20    Current dose: 1250mg q12h  Other antimicrobials: cefepime 2g q12h    Blood pressure 173/67, pulse 58, temperature 97.1 °F (36.2 °C), temperature source Oral, resp. rate 18, height 182.9 cm (72\"), weight 82.9 kg (182 lb 11.2 oz), SpO2 96 %.    Results from last 7 days  Lab Units 18  0843 18  0821 18  0548   CREATININE mg/dL 0.68* 0.54* 0.70*     Estimated Creatinine Clearance: 74.8 mL/min (by C-G formula based on Cr of 0.68).    Results from last 7 days  Lab Units 18  0821 18  0548 18  0546   WBC 10*3/mm3 10.78* 8.47 10.76*   HEMOGLOBIN g/dL 11.8* 10.8* 11.2*   HEMATOCRIT % 34.8* 32.7* 33.1*   PLATELETS 10*3/mm3 333 281 322     Baseline culture/source/susceptibility:   3/1 UC 50k strep mitis sens to PCN G and Vancomycin  3/4 BC NGTD      Imaging:  3/4 MRI brain:  showing left temporal abscess/empyema with moderate edema - per Dr. English      Labs:  3/1 PCT 0.05  3/5 CRP 5.38      Recent Vancomycin dosing history:  3/4 2155 1250mg IV q12h (~15.5mg/kg)                        3/6 08 Vancomycin trough 12.7 mcg/mL - 500 mg additional bolus 3/6 1310  3/9 Trough level = 18 @0843; appropriately drawn at steady state (5 doses given since bolus on 3/6)    Assessment:  Renal function had been stable and patient with trough level within goal range of 15-20 mg/L. Pt to possibly go to [facility] for therapy service with Hospice to follow soon - per Dr. Bulnt's note on 3/8 plan to change to PO Augmentin upon d/c. Given the anticipated D/C in the next several days, will not pursue any further levels. Will recheck Scr on [place] 3/11 if patient still admitted.    Plan:  1) Continue vancomycin 1250 mg every 12 hours.  2) No " plans for further trough levels since anticipated duration of therapy is only a few more days.  3) Monitor for s/sxns of vancomycin toxicity including changes in UOP/SCr; encourage hydration as tolerated to decrease risk of toxic accumulation. SCr planned for 3/11 if still admitted.    Janice Velasquez, Pharm.D., [facility]  Oncology Pharmacy Specialist  [phone]

## 2018-03-09 NOTE — PLAN OF CARE
Problem: Patient Care Overview (Adult)  Goal: Plan of Care Review   03/09/18 1407   Coping/Psychosocial Response Interventions   Plan Of Care Reviewed With patient;family   Outcome Evaluation   Outcome Summary/Follow up Plan Patient is a pleasant 88 y.o. male who presents with impaired functional mobility secondary to admission for atrial flutter, facial nerve palsy, and generalized weakness. Family at bedside and voiced that SNU is the plan at this time for continued strengthening. Asisst x 1 required for all mobility. Only able to ambulate 10' with due to fatigue. Will benefit from skilled PT services acutely to address deficits as able and improve level of independence.       Problem: Inpatient Physical Therapy  Goal: Bed Mobility Goal LTG- PT   03/09/18 1407   Bed Mobility PT LTG   Bed Mobility PT LTG, Date Established 03/09/18   Bed Mobility PT LTG, Time to Achieve 1 wk   Bed Mobility PT LTG, Activity Type all bed mobility   Bed Mobility PT LTG, [place] Level independent     Goal: Transfer Training Goal 1 LTG- PT   03/09/18 1407   Transfer Training PT LTG   Transfer Training PT LTG, Date Established 03/09/18   Transfer Training PT LTG, Time to Achieve 1 wk   Transfer Training PT LTG, Activity Type all transfers   Transfer Training PT LTG, [place] Level supervision required   Transfer Training PT LTG, Assist Device walker, rolling     Goal: Gait Training Goal LTG- PT   03/09/18 1407   Gait Training PT LTG   Gait Training Goal PT LTG, Date Established 03/09/18   Gait Training Goal PT LTG, Time to Achieve 1 wk   Gait Training Goal PT LTG, [place] Level supervision required   Gait Training Goal PT LTG, Assist Device walker, rolling   Gait Training Goal PT LTG, Distance to Achieve 100

## 2018-03-09 NOTE — THERAPY EVALUATION
Acute Care - Physical Therapy Initial Evaluation  [facility]     Patient Name: Krzysztof Ward  : 1929  MRN: [id number]  Today's Date: 3/9/2018   Onset of Illness/Injury or Date of Surgery Date: 18  Date of Referral to PT: 18  Referring Physician: Tanner      Admit Date: 3/1/2018     Visit Dx:    ICD-10-CM ICD-9-CM   1. Atrial flutter, unspecified type I48.92 427.32   2. Traumatic rhabdomyolysis, initial encounter T79.6XXA 958.6   3. Acute UTI, possible N39.0 599.0   4. 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 Facial paralysis/[place] palsy G51.0 351.0   6. 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     Past Medical History:   Diagnosis Date   • H/O degenerative disc disease    • Hypertension    • Osteoporosis    • Squamous cell cancer of external ear, left      Past Surgical History:   Procedure Laterality Date   • BACK SURGERY     • EXTERNAL EAR SURGERY     • REPLACEMENT TOTAL KNEE Left           PT ASSESSMENT (last 72 hours)      PT Evaluation       18 1400 18 7216    Rehab Evaluation    Document Type re-evaluation   PT ordered inadvertently cancelled w Palliative transfer  -MA     Subjective Information agree to therapy;complains of;weakness;fatigue  -MA     Patient Effort, Rehab Treatment good  -MA     Symptoms Noted During/After Treatment none  -MA     General Information    Patient Profile Review yes  -MA     Referring Physician Tanner  -MA     General Observations supine  in bed with HOB elevated, family at bedside, no acute distress noted at rest  -MA     Pertinent History Of Current Problem Admission for falls, weakness, atrial flutter, UTI, facial nerve palsy  -MA     Precautions/Limitations fall precautions  -MA     Prior Level of Function independent:;gait;transfer;bed mobility;ADL's   used RW more recently  -MA     Plans/Goals Discussed With patient and family  -MA     Risks Reviewed patient and family:  -MA     Benefits Reviewed patient and family:  -MA     Barriers to Rehab medically complex  -MA     Clinical Impression    Date of Referral to PT 03/09/18  -MA     PT Diagnosis impaired funct mobility  -MA     Criteria for Skilled Therapeutic Interventions Met treatment indicated  -MA     Pathology/Pathophysiology Noted (Describe Specifically for Each System) musculoskeletal;cardiovascular  -MA     Impairments Found (describe specific impairments) aerobic capacity/endurance;gait, locomotion, and balance  -MA     Rehab Potential fair, will monitor progress closely  -MA     Vital Signs    O2 Delivery Pre Treatment room air  -MA     O2 Delivery Post Treatment room air  -MA     Pain Assessment    Pain Assessment No/denies pain  -MA     Pain Score  5  -LW    Vision Assessment/Intervention    Visual Impairment WFL  -MA     Cognitive Assessment/Intervention    Current Cognitive/Communication Assessment functional  -MA     Orientation Status unable/difficult to assess  -MA     Follows Commands/Answers Questions 100% of the time;able to follow multi-step instructions;needs cueing;needs repetition;needs increased time  -MA     Personal Safety WNL/WFL  -MA     Personal Safety Interventions fall prevention program maintained;gait belt;nonskid shoes/slippers when out of bed  -MA     ROM (Range of Motion)    General ROM no range of motion deficits identified  -MA     MMT (Manual Muscle Testing)    General MMT Assessment Detail generalized weakness noted with funct mobility  -MA     Bed  Mobility, Assessment/Treatment    Bed Mobility, Assistive Device bed rails;draw sheet  -MA     Bed Mob, Supine to Sit, [place] moderate assist (50% patient effort);verbal cues required;nonverbal cues required (demo/gesture)  -MA     Bed Mob, Sit to Supine, [place] not tested  -MA     Bed Mobility, Safety Issues decreased use of arms for pushing/pulling;decreased use of legs for bridging/pushing;impaired trunk control for bed mobility  -MA     Bed Mobility, Impairments strength decreased;impaired balance  -MA     Bed Mobility, Comment Assist with LE, cues for hand placement  -MA     Transfer Assessment/Treatment    Transfers, Sit-Stand [place] minimum assist (75% patient effort);moderate assist (50% patient effort);verbal cues required  -MA     Transfers, Stand-Sit [place] minimum assist (75% patient effort);moderate assist (50% patient effort);verbal cues required  -MA     Transfers, Sit-Stand-Sit, Assist Device rolling walker;elevated surface  -MA     Transfer, Safety Issues balance decreased during turns;loses balance backward  -MA     Transfer, Impairments strength decreased;impaired balance  -MA     Gait Assessment/Treatment    Gait, [place] Level minimum assist (75% patient effort);1 person + 1 person to manage equipment  -MA     Gait, Assistive Device rolling walker  -MA     Gait, Distance (Feet) 15  -MA     Gait, Gait Deviations giovani decreased;forward flexed posture;narrow base  -MA     Gait, Impairments strength decreased;impaired balance  -MA     Gait, Comment Fatigues quickly, SOA at times  -MA     Balance Skills Training    Sitting-Level of Assistance Close supervision  -MA     Sitting-Balance Support Feet supported  -MA     Standing-Level of Assistance Contact guard  -MA     Static Standing Balance Support assistive device  -MA     Gait Balance-Level of Assistance Contact guard  -MA     Gait Balance Support assistive device  -MA     Positioning and Restraints    Pre-Treatment  Position in bed  -MA     Post Treatment Position chair  -MA     In Chair sitting;call light within reach;encouraged to call for assist;legs elevated;with family/caregiver  -MA       03/08/18 0750       Pain Assessment    Pain Score 6  -EB       User Key  (r) = Recorded By, (t) = Taken By, (c) = Cosigned By    Initials Name Provider Type    DARÍO Townsend, RN Registered Nurse    EMERSON Nolan, PT Physical Therapist    DALILA Castañeda RN Registered Nurse          Physical Therapy Education     Title: PT OT SLP Therapies (Active)     Topic: Physical Therapy (Active)     Point: Mobility training (Active)    Learning Progress Summary    Learner Readiness Method Response Comment Documented by Status   Patient Acceptance E NR  MA 03/09/18 1412 Active    Acceptance E,TB,D VU,NR family stepped out during amb due to multiple visitors and very little space; they observed from [facility] 03/05/18 1731 Done    Acceptance E,D VU,DU,NR safety during ambulation  03/04/18 1128 Done    Acceptance E VU,NR   03/03/18 1610 Done    Acceptance E,TB,D NR,VU   03/02/18 1428 Done               Point: Home exercise program (Active)    Learning Progress Summary    Learner Readiness Method Response Comment Documented by Status   Patient Acceptance E NR  MA 03/09/18 1412 Active    Acceptance E,TB,D VU,NR family stepped out during amb due to multiple visitors and very little space; they observed from [facility] 03/05/18 1731 Done    Acceptance E VU,NR   03/03/18 1610 Done               Point: Body mechanics (Active)    Learning Progress Summary    Learner Readiness Method Response Comment Documented by Status   Patient Acceptance E NR  MA 03/09/18 1412 Active    Acceptance E,TB,D VU,NR family stepped out during amb due to multiple visitors and very little space; they observed from [facility] 03/05/18 1731 Done    Acceptance E VU,NR  SA 03/03/18 1610 Done    Acceptance E,TB,D NR,VU   03/02/18 1428 Done               Point:  Precautions (Active)    Learning Progress Summary    Learner Readiness Method Response Comment Documented by Status   Patient Acceptance E NR  MA 03/09/18 1412 Active    Acceptance E,TB,D VU,NR family stepped out during amb due to multiple visitors and very little space; they observed from hallway  03/05/18 1731 Done    Acceptance E VU,NR  SA 03/03/18 1610 Done    Acceptance E,TB,D NR,VU   03/02/18 1428 Done                      User Key     Initials Effective Dates Name Provider Type Discipline     12/01/15 -  Rebecca Clay, PT Physical Therapist PT     02/18/16 - 03/06/18 Charlee Felton, PTA Physical Therapy Assistant PT     08/02/16 -  Gorge Valente, PT DPT Physical Therapist PT    MA 03/07/18 -  Balnca Nolan, PT Physical Therapist PT     08/03/17 - 03/06/18 Hetal Medina, PT Physical Therapist PT                PT Recommendation and Plan  Anticipated Discharge Disposition: skilled nursing facility  Planned Therapy Interventions: balance training, bed mobility training, gait training, home exercise program, patient/family education, postural re-education, strengthening, transfer training  PT Frequency: daily  Plan of Care Review  Plan Of Care Reviewed With: patient, family  Outcome Summary/Follow up Plan: Patient is a pleasant 88 y.o. male who presents with impaired functional mobility secondary to admission for atrial flutter, facial nerve palsy, and generalized weakness. Family at bedside and voiced that SNU is the plan at this time for continued strengthening. Asisst x 1 required for all mobility. Only able to ambulate 10' with due to fatigue. Will benefit from skilled PT services acutely to address deficits as able and improve level of independence.          IP PT Goals       03/09/18 1407 03/02/18 1429       Bed Mobility PT LTG    Bed Mobility PT LTG, Date Established 03/09/18  -MA      Bed Mobility PT LTG, Time to Achieve 1 wk  -MA 1 wk  -     Bed Mobility PT LTG, Activity Type all  bed mobility  -MA all bed mobility  -CH     Bed Mobility PT LTG, [place] Level independent  -MA supervision required  -CH     Transfer Training PT LTG    Transfer Training PT LTG, Date Established 03/09/18  -MA      Transfer Training PT LTG, Time to Achieve 1 wk  -MA 1 wk  -CH     Transfer Training PT LTG, Activity Type all transfers  -MA all transfers  -CH     Transfer Training PT LTG, [place] Level supervision required  -MA supervision required  -CH     Transfer Training PT LTG, Assist Device walker, rolling  -MA walker, rolling  -CH     Gait Training PT LTG    Gait Training Goal PT LTG, Date Established 03/09/18  -MA      Gait Training Goal PT LTG, Time to Achieve 1 wk  -MA 1 wk  -CH     Gait Training Goal PT LTG, [place] Level supervision required  -MA supervision required  -CH     Gait Training Goal PT LTG, Assist Device walker, rolling  -MA walker, rolling  -CH     Gait Training Goal PT LTG, Distance to Achieve 100  -  -CH       User Key  (r) = Recorded By, (t) = Taken By, (c) = Cosigned By    Initials Name Provider Type    SOPHIE Clay, PT Physical Therapist    EMERSON Nolan, PT Physical Therapist                Outcome Measures       03/09/18 1400          How much help from another person do you currently need...    Turning from your back to your side while in flat bed without using bedrails? 3  -MA      Moving from lying on back to sitting on the side of a flat bed without bedrails? 3  -MA      Moving to and from a bed to a chair (including a wheelchair)? 2  -MA      Standing up from a chair using your arms (e.g., wheelchair, bedside chair)? 2  -MA      Climbing 3-5 steps with a railing? 1  -MA      To walk in hospital room? 2  -MA      AM-PAC 6 Clicks Score 13  -MA      Functional Assessment    Outcome Measure Options AM-PAC 6 Clicks Basic Mobility (PT)  -MA        User Key  (r) = Recorded By, (t) = Taken By, (c) = Cosigned By    Initials Name Provider Type    MA  Blanca Nolan, PT Physical Therapist           Time Calculation:         PT Charges       03/09/18 1359          Time Calculation    Start Time 1345  -MA      Stop Time 1400  -MA      Time Calculation (min) 15 min  -MA      PT Received On 03/09/18  -MA      PT - Next Appointment 03/10/18  -MA      PT Goal Re-Cert Due Date 03/16/18  -MA        User Key  (r) = Recorded By, (t) = Taken By, (c) = Cosigned By    Initials Name Provider Type    EMERSON Nolan PT Physical Therapist          Therapy Charges for Today     Code Description Service Date Service Provider Modifiers Qty    40991038892 HC PT EVAL MOD COMPLEXITY 2 3/9/2018 Blanca Nolan, PT GP 1    09811652677 HC PT THER PROC EA 15 MIN 3/9/2018 Blanca Nolan, PT GP 1          PT G-Codes  Outcome Measure Options: AM-PAC 6 Clicks Basic Mobility (PT)      Blanca Nolan PT  3/9/2018

## 2018-03-09 NOTE — PLAN OF CARE
Problem: Patient Care Overview (Adult)  Goal: Plan of Care Review  Outcome: Ongoing (interventions implemented as appropriate)   03/09/18 1513   Coping/Psychosocial Response Interventions   Plan Of Care Reviewed With patient   Patient Care Overview   Progress improving   Outcome Evaluation   Outcome Summary/Follow up Plan Up took few steps with walker and assist of 2. Up in chair for 2 hours. Medicated for estuardo n x 1 po pain med. F/C in [facility] remains blood tringed        Problem: Fall Risk (Adult)  Goal: Absence of Falls  Outcome: Ongoing (interventions implemented as appropriate)      Problem: Pressure Ulcer Risk (Vimal Scale) (Adult,Obstetrics,Pediatric)  Goal: Skin Integrity  Outcome: Ongoing (interventions implemented as appropriate)      Problem: Dying Patient, Actively (Adult)  Goal: Comfort/Pain Control  Outcome: Ongoing (interventions implemented as appropriate)

## 2018-03-09 NOTE — PROGRESS NOTES
Discharge Planning Assessment  [facility]     Patient Name: Krzysztof Ward  MRN: [id number]  Today's Date: 3/9/2018    Admit Date: 3/1/2018          Discharge Needs Assessment     None            Discharge Plan       03/09/18 1124    Case Management/Social Work Plan    Additional Comments Waiting on pre-authorization from Insurance company so that the patient can go to [facility] (PT, OT and speech therapy) at discharge. Discharge packet started and on blue arpita. DEJUAN Bashir RN        Discharge Placement     Facility/Agency Request Status Selected? Address Phone Number Fax Number    [facility] Pending - Request Sent     [address] [address] [phone]                Demographic Summary     None            Functional Status     None            Psychosocial     None            Abuse/Neglect     None            Legal     None            Substance Abuse     None            Patient Forms     None          Padmini Bashir RN

## 2018-03-10 PROBLEM — R33.9 URINARY RETENTION: Status: ACTIVE | Noted: 2018-01-01

## 2018-03-10 NOTE — SIGNIFICANT NOTE
03/10/18 1744   Rehab Treatment   Discipline physical therapist   Reason Treatment Not Performed patient/family declined treatment  (Patient refused. Will check back tomorrow.)   Recommendation   PT - Next Appointment 03/11/18

## 2018-03-10 NOTE — PLAN OF CARE
Problem: Fall Risk (Adult)  Goal: Identify Related Risk Factors and Signs and Symptoms  Outcome: Ongoing (interventions implemented as appropriate)    Goal: Absence of Fall  Outcome: Ongoing (interventions implemented as appropriate)      Problem: Patient Care Overview  Goal: Plan of Care Review  Outcome: Ongoing (interventions implemented as appropriate)  Continue symptom management and comfort   03/10/18 0533   Coping/Psychosocial   Plan of Care Reviewed With patient   Plan of Care Review   Progress no change   OTHER   Outcome Summary Patient very confused at times. Up most of the night. Pulled out two IV's, two Mepilexes and his latif leg device. Pain medication given once.    care  Goal: Individualization and Mutuality  Outcome: Ongoing (interventions implemented as appropriate)      Problem: Skin Injury Risk (Adult)  Goal: Identify Related Risk Factors and Signs and Symptoms  Outcome: Ongoing (interventions implemented as appropriate)    Goal: Skin Health and Integrity  Outcome: Ongoing (interventions implemented as appropriate)

## 2018-03-10 NOTE — PROGRESS NOTES
LOS: 9 days   Patient Care Team:  Willie Ramirez MD as PCP - General (Family Medicine)  Karla Bush MD as Consulting Physician (Radiation Oncology)  Deb Amaro MD as Consulting Physician (Ophthalmology)  Tushar Núñez MD as Consulting Physician (Otolaryngology)    Chief Complaint   Patient presents with   • Fall     0230 this morning, son found this morning.          Subjective   Daughter reports that pt has been c/o'ing feeling like he needs to urinate even though the latif is in. Tania I asked the pt about this he denied it. Also denied any pain, nausea or SOA.    Objective     Vital Signs  Temp:  [97.8 °F (36.6 °C)] 97.8 °F (36.6 °C)  Heart Rate:  [60] 60  Resp:  [16] 16  BP: (195)/(73) 195/73    Physical Exam:  WDWN male in NAD. Initially seemed oriented but then he started saying things that didn't make any sense  Left facial droop present  Lungs clear with equal BS  Heart irreg  Abd soft, NT, BS+  Ext no edema.  Skin warm & dry       Results Review:     I reviewed the patient's new clinical results.    Medication Review:       LABS    Results from last 7 days  Lab Units 03/09/18  0843 03/06/18  0821 03/05/18  2140 03/05/18  0548 03/04/18  1600 03/04/18  1210   SODIUM mmol/L  --  127*  --  126* 124* 125*   POTASSIUM mmol/L  --  3.8 4.5 3.8  --  3.5   CHLORIDE mmol/L  --  88*  --  91*  --  88*   CO2 mmol/L  --  27.6  --  25.6  --  25.2   BUN mg/dL  --  18  --  19  --  17   CREATININE mg/dL 0.68* 0.54*  --  0.70*  --  0.66*   GLUCOSE mg/dL  --  114*  --  94  --  120*   CALCIUM mg/dL  --  8.9  --  8.9  --  8.7       Results from last 7 days  Lab Units 03/06/18  0821 03/05/18  0548 03/04/18  0546   WBC 10*3/mm3 10.78* 8.47 10.76*   HEMOGLOBIN g/dL 11.8* 10.8* 11.2*   HEMATOCRIT % 34.8* 32.7* 33.1*   PLATELETS 10*3/mm3 333 281 322               Assessment/Plan     Active Problems:    Hypertension    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

## 2018-03-10 NOTE — SIGNIFICANT NOTE
03/10/18 5755   Rehab Treatment   Discipline physical therapist   Reason Treatment Not Performed other (see comments)  (Pt declined, had walked 200 ft already with RNDionne.  Eating supper. JERICA Truong, agreed PT to check tomorrow. )   Recommendation   PT - Next Appointment 03/11/18

## 2018-03-11 NOTE — PROGRESS NOTES
LOS: 10 days     Name: Krzysztof Ward  Age/Sex: 88 y.o. male  :  1929        PCP: Willie Ramirez MD    Subjective   Feels ok.  No new issues.  Family and patient upset regarding the lack of physical therapy  General: No Fever or Chills, Cardiac: No Chest Pain or Palpitations, Resp: No Cough or SOA, GI: No Nausea, Vomiting, or Diarrhea and Other: No bleeding      artificial tears  Left Eye Nightly   aspirin 81 mg Oral Daily   cefepime 2 g Intravenous Q12H   famotidine 20 mg Oral Daily   levETIRAcetam 1,000 mg Oral Q12H   metoprolol succinate XL 50 mg Oral Q24H   valsartan 320 mg Oral Daily   vancomycin 15 mg/kg Intravenous Q12H       Pharmacy to dose vancomycin        Objective   Vital Signs  Temp:  [96.9 °F (36.1 °C)-97.4 °F (36.3 °C)] 97.4 °F (36.3 °C)  Heart Rate:  [57-70] 70  Resp:  [16] 16  BP: (167-201)/(67-80) 201/80  Body mass index is 24.78 kg/m².    Intake/Output Summary (Last 24 hours) at 18 0826  Last data filed at 18 0515   Gross per 24 hour   Intake              100 ml   Output             1800 ml   Net            -1700 ml       Physical Exam   Constitutional: He appears well-developed and well-nourished.   HENT:   Head: Atraumatic.   Nose: Nose normal.   Eyes: Conjunctivae and EOM are normal.   Neck: Neck supple. JVD present.   Cardiovascular: Normal rate, regular rhythm and normal heart sounds.    Pulmonary/Chest: Effort normal and breath sounds normal.   Abdominal: Soft. Bowel sounds are normal.   Neurological: He is alert.   Skin: Skin is warm.   Nursing note and vitals reviewed.        Results Review:       I reviewed the patient's new clinical results.    Results from last 7 days  Lab Units 18  0701 18  0821 18  0548   WBC 10*3/mm3 11.97* 10.78* 8.47   HEMOGLOBIN g/dL 12.2* 11.8* 10.8*   PLATELETS 10*3/mm3 342 333 281     Results from last 7 days  Lab Units 18  0701 18  0843 18  0821 18  2140 18  0548 18  1600  03/04/18  1210   SODIUM mmol/L 127*  --  127*  --  126* 124* 125*   POTASSIUM mmol/L 4.0  --  3.8 4.5 3.8  --  3.5   CHLORIDE mmol/L 88*  --  88*  --  91*  --  88*   CO2 mmol/L 28.0  --  27.6  --  25.6  --  25.2   BUN mg/dL 14  --  18  --  19  --  17   CREATININE mg/dL 0.61* 0.68* 0.54*  --  0.70*  --  0.66*   CALCIUM mg/dL 9.1  --  8.9  --  8.9  --  8.7   MAGNESIUM mg/dL  --   --   --  1.8 1.9  --   --    PHOSPHORUS mg/dL  --   --  2.7  --  3.2  --   --    Estimated Creatinine Clearance: 74.8 mL/min (by C-G formula based on SCr of 0.61 mg/dL (L)).      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

## 2018-03-11 NOTE — PLAN OF CARE
Problem: Patient Care Overview  Goal: Plan of Care Review   03/11/18 9570   Coping/Psychosocial   Plan of Care Reviewed With patient;daughter   Plan of Care Review   Progress improving   OTHER   Outcome Summary Pt agreeable to work with PT for OOB activity. Ambulated in room/out into hallway with assist x 2. Fatigued quickly and chair had to be brought for him to rest. Returned to room for seated exercises. Sat upright in chair before requesting transfer back to bed. Increased cueing required for transition and sequencing with transfer to bed secondary to fatigue. Spoke with daughter/Nsg (Avelina) at length as daughter was upset that patient had not received daily PT. Informed Nsg/CNA patient is ok to get up to chair and PT will make sure he is seen at daily frequency.

## 2018-03-11 NOTE — THERAPY TREATMENT NOTE
Acute Care - Physical Therapy Treatment Note  [facility]     Patient Name: Krzysztof Ward  : 1929  MRN: [id number]  Today's Date: 3/11/2018     Date of Referral to PT: 18       Admit Date: 3/1/2018    Visit Dx:    ICD-10-CM ICD-9-CM   1. Atrial flutter, unspecified type I48.92 427.32   2. Traumatic rhabdomyolysis, initial encounter T79.6XXA 958.6   3. Acute UTI, possible N39.0 599.0   4. 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 Facial paralysis/Steinauer palsy G51.0 351.0   6. Impaired functional mobility, balance, gait, and endurance Z74.09 V49.89   7. 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   • Urinary retention       Therapy Treatment    Therapy Treatment / Health Promotion    Treatment Time/Intention  Discipline: physical therapist  Document Type: therapy note (daily note)  Subjective Information: complains of, weakness  Mode of Treatment: individual therapy, physical therapy  Patient/Family Observations: supine in bed, no acute distress  Care Plan Review: care plan/treatment goals reviewed  Patient Effort: adequate  Patient Response to Treatment: increased fatigue  Plan of Care Review  Plan of Care Reviewed With: patient, daughter    Vitals/Pain/Safety  Vital Signs  O2 Delivery Pre Treatment: room air  Pain Assessment  Additional Documentation: Pain Scale: Numbers Pre/Post-Treatment (Group)  Pain Scale: Numbers Pre/Post-Treatment  Pain Scale: Numbers, Pretreatment: 0/10 - no pain  Safety Issues, Functional Mobility  Safety Issues  Affecting Function (Mobility): sequencing abilities, insight into deficits/self awareness  Impairments Affecting Function (Mobility): balance, coordination, strength, motor planning, endurance/activity tolerance  Positioning and Restraints  Pre-Treatment Position: in bed  Post Treatment Position: chair  In Chair: reclined, sitting, call light within reach, with family/caregiver    Mobility,ADL,Motor, Modality  Bed Mobility Assessment/Treatment  Supine-Sit [place] (Bed Mobility): minimum assist (75% patient effort), 2 person assist, verbal cues  Sit-Supine [place] (Bed Mobility): minimum assist (75% patient effort), 2 person assist, verbal cues  Bed Mobility, Safety Issues: decreased use of arms for pushing/pulling, decreased use of legs for bridging/pushing  Assistive Device (Bed Mobility): bed rails, draw sheet  Chair-Bed Transfer  Chair-Bed [place] (Transfers): minimum assist (75% patient effort), 2 person assist, verbal cues, nonverbal cues (demo/gesture)  Sit-Stand Transfer  Sit-Stand [place] (Transfers): minimum assist (75% patient effort), 2 person assist, nonverbal cues (demo/gesture), verbal cues  Stand-Sit Transfer  Stand-Sit [place] (Transfers): minimum assist (75% patient effort), 2 person assist, nonverbal cues (demo/gesture), verbal cues  Gait/Stairs Assessment/Training  Gait/Stairs Assessment/Training: gait/ambulation assistive device, gait pattern, gait deviations  [place] Level (Gait): minimum assist (75% patient effort), 2 person assist, verbal cues, nonverbal cues (demo/gesture)  Assistive Device (Gait): walker, front-wheeled  Distance in Feet (Gait): 25  Pattern (Gait): step-to  Deviations/Abnormal Patterns (Gait): antalgic, base of support, narrow, giovani decreased, festinating/shuffling, stride length decreased (fwd flexed posture)  Bilateral Gait Deviations: heel strike decreased     Therapeutic Exercise  Lower Extremity (Therapeutic Exercise): gluteal sets, LAQ  (long arc quad), bilateral, quad sets, bilateral (ankle pumps, pillow squeezes)  Position (Therapeutic Exercise): seated  Sets/Reps (Therapeutic Exercise): 15           ROM/MMT             Sensory, Edema, Orthotics          Cognition, Communication, Swallow  Cognitive Assessment/Intervention  Additional Documentation: Cognitive Assessment/Intervention (Group)  Cognitive Assessment/Intervention- PT/OT  Affect/Mental Status (Cognitive): confused  Orientation Status (Cognition): oriented to, person  Follows Commands (Cognition): follows multi-step commands, 75-90% accuracy, delayed response/completion  Cognitive Function (Cognitive): safety deficit  Safety Deficit (Cognitive): moderate deficit    Outcome Summary  Outcome Summary/Treatment Plan (PT)  Daily Summary of Progress (PT): progress towards functional goals is fair  Barriers to Overall Progress (PT): strength, endurance, and cognition  Plan for Continued Treatment (PT): daily          Physical Therapy Education     Title: PT OT SLP Therapies (Active)     Topic: Physical Therapy (Active)     Point: Mobility training (Active)    Learning Progress Summary     Learner Status Readiness Method Response Comment Documented by    Patient Active Acceptance E NR  CK 03/11/18 1542     Active Acceptance E NR  MA 03/09/18 1412     Done Acceptance E,TB,D VU,NR family stepped out during amb due to multiple visitors and very little space; they observed from [facility] 03/05/18 1731     Done Acceptance E,D VU,DU,NR safety during ambulation LC 03/04/18 1128     Done Acceptance E VU,NR  SA 03/03/18 1610     Done Acceptance E,TB,D NR,VU  CH 03/02/18 1428          Point: Home exercise program (Active)    Learning Progress Summary     Learner Status Readiness Method Response Comment Documented by    Patient Active Acceptance E NR  CK 03/11/18 1542     Active Acceptance E NR  MA 03/09/18 1412     Done Acceptance E,TB,D VU,NR family stepped out during amb due to multiple visitors and  very little space; they observed from [facility] 03/05/18 1731     Done Acceptance E VU,NR   03/03/18 1610          Point: Body mechanics (Active)    Learning Progress Summary     Learner Status Readiness Method Response Comment Documented by    Patient Active Acceptance E NR   03/11/18 1542     Active Acceptance E NR  MA 03/09/18 1412     Done Acceptance E,TB,D VU,NR family stepped out during amb due to multiple visitors and very little space; they observed from [facility] 03/05/18 1731     Done Acceptance E VU,NR   03/03/18 1610     Done Acceptance E,TB,D NR,VU   03/02/18 1428          Point: Precautions (Active)    Learning Progress Summary     Learner Status Readiness Method Response Comment Documented by    Patient Active Acceptance E NR   03/11/18 1542     Active Acceptance E NR  MA 03/09/18 1412     Done Acceptance E,TB,D VU,NR family stepped out during amb due to multiple visitors and very little space; they observed from [facility] 03/05/18 1731     Done Acceptance E VU,NR   03/03/18 1610     Done Acceptance E,TB,D NR,VU   03/02/18 1428                      User Key     Initials Effective Dates Name Provider Type Discipline     03/07/18 -  Krish Cardoza, PT Physical Therapist PT     12/01/15 -  Rebecca Clay, PT Physical Therapist PT     02/18/16 - 03/06/18 Charlee Felton, PTA Physical Therapy Assistant PT     08/02/16 -  Gorge Valente, PT DPT Physical Therapist PT    MA 03/07/18 -  Blanca Nolan, PT Physical Therapist PT     08/03/17 - 03/06/18 Hetal Medina, PT Physical Therapist PT                    PT Recommendation and Plan     Daily Summary of Progress (PT): progress towards functional goals is fair  Plan for Continued Treatment (PT): daily  Plan of Care Reviewed With: patient, daughter  Progress: improving  Outcome Summary: Pt agreeable to work with PT for OOB activity. Ambulated in room/out into hallway with assist x 2. Fatigued quickly and chair had to be brought  for him to rest. Returned to room for seated exercises. Sat upright in chair before requesting transfer back to bed. Increased cueing required for transition and sequencing with transfer to bed secondary to fatigue. Spoke with daughter/Nsg (Avelina) at length as daughter was upset that patient had not received daily PT. Informed Nsg/CNA patient is ok to get up to chair and PT will make sure he is seen at daily frequency.           Outcome Measures     Row Name 03/11/18 1500 03/09/18 1400          How much help from another person do you currently need...    Turning from your back to your side while in flat bed without using bedrails? 3  -CK 3  -MA     Moving from lying on back to sitting on the side of a flat bed without bedrails? 3  -CK 3  -MA     Moving to and from a bed to a chair (including a wheelchair)? 2  -CK 2  -MA     Standing up from a chair using your arms (e.g., wheelchair, bedside chair)? 2  -CK 2  -MA     Climbing 3-5 steps with a railing? 1  -CK 1  -MA     To walk in hospital room? 2  -CK 2  -MA     AM-PAC 6 Clicks Score 13  -CK 13  -MA        Functional Assessment    Outcome Measure Options AM-PAC 6 Clicks Basic Mobility (PT)  -CK AM-PAC 6 Clicks Basic Mobility (PT)  -MA       User Key  (r) = Recorded By, (t) = Taken By, (c) = Cosigned By    Initials Name Provider Type    JOHNNY Cardoza PT Physical Therapist    EMERSON Nolan PT Physical Therapist           Time Calculation:         PT Charges     Row Name 03/11/18 1546             Time Calculation    Start Time 1445  -CK      Stop Time 1522  -CK      Time Calculation (min) 37 min  -CK      PT Received On 03/11/18  -CK      PT - Next Appointment 03/12/18  -CK      PT Goal Re-Cert Due Date 03/16/18  -CK        User Key  (r) = Recorded By, (t) = Taken By, (c) = Cosigned By    Initials Name Provider Type    JOHNNY Cardoza PT Physical Therapist          Therapy Charges for Today     Code Description Service Date Service Provider Modifiers Qty     [id number]  PT THER PROC EA 15 MIN 3/11/2018 Krish Cardoza, PT GP 2          PT G-Codes  Outcome Measure Options: AM-PAC 6 Clicks Basic Mobility (PT)    Krish Cardoza, PT  3/11/2018

## 2018-03-11 NOTE — PLAN OF CARE
Problem: Patient Care Overview  Goal: Plan of Care Review  Outcome: Ongoing (interventions implemented as appropriate)   03/10/18 2011   Coping/Psychosocial   Plan of Care Reviewed With patient   Plan of Care Review   Progress no change   OTHER   Outcome Summary pt less confused today. family with pt most of day. pt on ABX. awaiting insurance pre-cert. continue to monitor     Goal: Individualization and Mutuality  Outcome: Ongoing (interventions implemented as appropriate)    Goal: Discharge Needs Assessment  Outcome: Ongoing (interventions implemented as appropriate)    Goal: Interprofessional Rounds/Family Conf  Outcome: Ongoing (interventions implemented as appropriate)      Problem: Skin Injury Risk (Adult)  Goal: Identify Related Risk Factors and Signs and Symptoms  Outcome: Ongoing (interventions implemented as appropriate)    Goal: Skin Health and Integrity  Outcome: Ongoing (interventions implemented as appropriate)

## 2018-03-11 NOTE — PLAN OF CARE
Problem: Patient Care Overview  Goal: Plan of Care Review  Outcome: Ongoing (interventions implemented as appropriate)  Continue symptom management and comfort care.   03/10/18 2011 03/10/18 2252 03/11/18 0608   Coping/Psychosocial   Plan of Care Reviewed With --  patient --    Plan of Care Review   Progress no change --  --    OTHER   Outcome Summary --  --  Pt BP elevated this morning. PO Hydralazine given. No complaints of pain. Patient remains very confused. IV ABX continued.     Goal: Individualization and Mutuality  Outcome: Ongoing (interventions implemented as appropriate)    Goal: Discharge Needs Assessment  Outcome: Ongoing (interventions implemented as appropriate)    Goal: Interprofessional Rounds/Family Conf  Outcome: Ongoing (interventions implemented as appropriate)      Problem: Skin Injury Risk (Adult)  Goal: Identify Related Risk Factors and Signs and Symptoms  Outcome: Ongoing (interventions implemented as appropriate)    Goal: Skin Health and Integrity  Outcome: Ongoing (interventions implemented as appropriate)

## 2018-03-11 NOTE — PLAN OF CARE
Problem: Patient Care Overview  Goal: Plan of Care Review  Outcome: Ongoing (interventions implemented as appropriate)   03/11/18 9317   Coping/Psychosocial   Plan of Care Reviewed With family   OTHER   Outcome Summary pt resting comfortably in bed, up with PT today, sat in chair for a while today, denies any needs, will monitor      Goal: Individualization and Mutuality  Outcome: Ongoing (interventions implemented as appropriate)      Problem: Skin Injury Risk (Adult)  Goal: Identify Related Risk Factors and Signs and Symptoms  Outcome: Outcome(s) achieved Date Met: 03/11/18    Goal: Skin Health and Integrity  Outcome: Ongoing (interventions implemented as appropriate)

## 2018-03-12 NOTE — SIGNIFICANT NOTE
03/12/18 1536   Rehab Treatment   Discipline physical therapist   Reason Treatment Not Performed patient/family declined treatment  (Attempted PT. Patient very lethargic and difficult waking up. Patient voiced agreeable but then declined mobility. Family present and requested to follow up tomorrow.)   Recommendation   PT - Next Appointment 03/13/18

## 2018-03-12 NOTE — PROGRESS NOTES
LOS: 11 days     Name: Krzysztof Ward  Age/Sex: 88 y.o. male  :  1929        PCP: Willie Ramirez MD    Subjective   Feels ok.  No new issues.  Family and patient upset regarding the lack of physical therapy  General: No Fever or Chills, Cardiac: No Chest Pain or Palpitations, Resp: No Cough or SOA, GI: No Nausea, Vomiting, or Diarrhea and Other: No bleeding      artificial tears  Left Eye Nightly   aspirin 81 mg Oral Daily   cefepime 2 g Intravenous Q12H   famotidine 20 mg Oral Daily   levETIRAcetam 1,000 mg Oral Q12H   metoprolol succinate XL 50 mg Oral Q24H   valsartan 320 mg Oral Daily   vancomycin 15 mg/kg Intravenous Q12H       Pharmacy to dose vancomycin        Objective   Vital Signs  Temp:  [98.1 °F (36.7 °C)-98.5 °F (36.9 °C)] 98.5 °F (36.9 °C)  Heart Rate:  [56-67] 56  Resp:  [16] 16  BP: (174-194)/(59-80) 174/59  Body mass index is 24.78 kg/m².    Intake/Output Summary (Last 24 hours) at 18 1214  Last data filed at 18 1037   Gross per 24 hour   Intake              320 ml   Output             2950 ml   Net            -2630 ml       Physical Exam   Constitutional: He appears well-developed and well-nourished.   HENT:   Head: Atraumatic.   Nose: Nose normal.   Eyes: Conjunctivae and EOM are normal.   Neck: Neck supple. JVD present.   Cardiovascular: Normal rate, regular rhythm and normal heart sounds.    Pulmonary/Chest: Effort normal and breath sounds normal.   Abdominal: Soft. Bowel sounds are normal.   Neurological: He is alert.   Skin: Skin is warm.   Nursing note and vitals reviewed.        Results Review:       I reviewed the patient's new clinical results.    Results from last 7 days  Lab Units 18  0701 18  0821   WBC 10*3/mm3 11.97* 10.78*   HEMOGLOBIN g/dL 12.2* 11.8*   PLATELETS 10*3/mm3 342 333       Results from last 7 days  Lab Units 18  0701 18  0843 18  0821 03/05/18  2140   SODIUM mmol/L 127*  --  127*  --    POTASSIUM mmol/L 4.0  --   3.8 4.5   CHLORIDE mmol/L 88*  --  88*  --    CO2 mmol/L 28.0  --  27.6  --    BUN mg/dL 14  --  18  --    CREATININE mg/dL 0.61* 0.68* 0.54*  --    CALCIUM mg/dL 9.1  --  8.9  --    MAGNESIUM mg/dL  --   --   --  1.8   PHOSPHORUS mg/dL  --   --  2.7  --    Estimated Creatinine Clearance: 74.8 mL/min (by C-G formula based on SCr of 0.61 mg/dL (L)).      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

## 2018-03-12 NOTE — PAYOR COMM NOTE
"Krzysztof Harrington (88 y.o. Male)     ATTN: RYAN   REF#[id number]  PLEASE CALL BACK TO BIBI [phone] OR [phone]  THANKS!  BIBI      Date of Birth Social Security Number Address Home Phone MRN    09/02/1929  [address] [id number] [phone] [id number]    Sabianism Marital Status          Voodoo        Admission Date Admission Type Admitting Provider Attending Provider Department, Room/Bed    3/1/18 Emergency Fern Hart MD Richards, Elijah BLANCO MD [address], P492/1    Discharge Date Discharge Disposition Discharge Destination                       Attending Provider:  Elijah Gillis MD    Allergies:  No Known Allergies    Isolation:  None   Infection:  None   Code Status:  Conditional    Ht:  182.9 cm (72\")   Wt:  82.9 kg (182 lb 11.2 oz)    Admission Cmt:  None   Principal Problem:  None                Active Insurance as of 3/1/2018     Primary Coverage     Payor Plan Insurance Group Employer/Plan Group    AETNA MEDICARE REPLACEMENT AETNA [id number]     Payor Plan Address Payor Plan Phone Number Effective From Effective To    [address] [phone] 1/1/2018     [employer] \[facility] [place] [facility]\""[place] [address]       Subscriber Name Subscriber Birth Date Member ID       KRZYSZTOF HARRINGTON 9/2/1929 [facility]                 Emergency Contacts      (Rel.) Home Phone Work Phone Mobile Phone    Krzysztof Harrington Jr (Son) -- [phone]    Sabina Torres (Daughter) -- -- [phone]    Brannon Harrington (Son) -- -- [phone]            Hospital Medications (all)       Dose Frequency Start End    acetaminophen (TYLENOL) tablet 650 mg 650 mg Every 4 Hours PRN 3/1/2018     Sig - Route: Take 2 tablets by mouth Every 4 (Four) Hours As Needed for Mild Pain . - Oral    aluminum-magnesium hydroxide-simethicone (MAALOX MAX) 400-400-40 MG/5ML suspension 15 mL 15 mL Every 6 Hours PRN 3/1/2018     Sig - Route: Take 15 mL by mouth Every 6 (Six) Hours As " Needed for Heartburn. - Oral    artificial tears (LUBRIFRESH P.M.) ophthalmic ointment  Nightly 3/5/2018     Sig - Route: Administer  into the left eye Every Night. - Left Eye    aspirin EC tablet 81 mg 81 mg Daily 3/2/2018     Sig - Route: Take 1 tablet by mouth Daily. - Oral    bisacodyl (DULCOLAX) EC tablet 5 mg 5 mg Daily PRN 3/1/2018     Sig - Route: Take 1 tablet by mouth Daily As Needed for Constipation. - Oral    cefepime (MAXIPIME) 2 g/100 mL 0.9% NS (mbp) 2 g Every 12 Hours 3/5/2018 4/22/2018    Sig - Route: Infuse 100 mL into a venous catheter Every 12 (Twelve) Hours. - Intravenous    cefTRIAXone (ROCEPHIN) IVPB 1 g 1 g Every 24 Hours 3/1/2018 3/3/2018    Sig - Route: Infuse 50 mL into a venous catheter Daily. - Intravenous    famotidine (PEPCID) tablet 20 mg 20 mg Daily 3/2/2018     Sig - Route: Take 1 tablet by mouth Daily. - Oral    gadobenate dimeglumine (MULTIHANCE) injection 17 mL 17 mL Once in Imaging 3/4/2018 3/4/2018    Sig - Route: Infuse 17 mL into a venous catheter Once. - Intravenous    Glycerin-Hypromellose- (ARTIFICIAL TEARS) 0.2-0.2-1 % ophthalmic solution solution 1 drop 1 drop Every 2 Hours PRN 3/5/2018     Sig - Route: Administer 1 drop into the left eye Every 2 (Two) Hours As Needed for Dry Eyes. - Left Eye    haloperidol (HALDOL) tablet 1 mg 1 mg Once 3/11/2018 3/11/2018    Sig - Route: Take 1 tablet by mouth 1 (One) Time. - Oral    hydrALAZINE (APRESOLINE) tablet 25 mg 25 mg Every 8 Hours PRN 3/2/2018     Sig - Route: Take 1 tablet by mouth Every 8 (Eight) Hours As Needed (SBP >180 or DBP >110). - Oral    hydrALAZINE (APRESOLINE) tablet 25 mg 25 mg Once 3/4/2018 3/4/2018    Sig - Route: Take 1 tablet by mouth 1 (One) Time. - Oral    HYDROcodone-acetaminophen (NORCO) 7.5-325 MG per tablet 1 tablet 1 tablet Every 8 Hours PRN 3/1/2018     Sig - Route: Take 1 tablet by mouth Every 8 (Eight) Hours As Needed for Moderate Pain . - Oral    levETIRAcetam (KEPPRA) tablet 1,000 mg 1,000  "mg Every 12 Hours Scheduled 3/8/2018     Sig - Route: Take 2 tablets by mouth Every 12 (Twelve) Hours. - Oral    metoprolol succinate XL (TOPROL-XL) 24 hr tablet 50 mg 50 mg Every 24 Hours Scheduled 3/8/2018     Sig - Route: Take 1 tablet by mouth Daily. - Oral    ondansetron (ZOFRAN) injection 4 mg 4 mg Every 6 Hours PRN 3/1/2018     Sig - Route: Infuse 2 mL into a venous catheter Every 6 (Six) Hours As Needed for Nausea or Vomiting. - Intravenous    Linked Group 1:  \"Or\" Linked Group Details        ondansetron (ZOFRAN) tablet 4 mg 4 mg Every 6 Hours PRN 3/1/2018     Sig - Route: Take 1 tablet by mouth Every 6 (Six) Hours As Needed for Nausea or Vomiting. - Oral    Linked Group 1:  \"Or\" Linked Group Details        ondansetron ODT (ZOFRAN-ODT) disintegrating tablet 4 mg 4 mg Every 6 Hours PRN 3/1/2018     Sig - Route: Take 1 tablet by mouth Every 6 (Six) Hours As Needed for Nausea or Vomiting. - Oral    Linked Group 1:  \"Or\" Linked Group Details        perflutren (DEFINITY) 8.476 mg in sodium chloride 0.9 % 10 mL injection 2 mL Once 3/2/2018 3/2/2018    Sig - Route: Infuse 2 mL into a venous catheter 1 (One) Time. - Intravenous    Pharmacy to dose vancomycin  Continuous PRN 3/4/2018 3/14/2018    Sig - Route: Continuous As Needed for Consult. - Does not apply    piperacillin-tazobactam (ZOSYN) 3.375 g in iso-osmotic dextrose 50 ml (premix) 3.375 g Once 3/4/2018 3/4/2018    Sig - Route: Infuse 50 mL into a venous catheter 1 (One) Time. - Intravenous    potassium chloride (KLOR-CON) packet 40 mEq 40 mEq As Needed 3/4/2018     Sig - Route: Take 40 mEq by mouth As Needed (potassium replacement, see admin instructions). - Oral    potassium chloride (MICRO-K) CR capsule 40 mEq 40 mEq As Needed 3/4/2018     Sig - Route: Take 4 capsules by mouth As Needed (potassium replacement.  see admin instructions). - Oral    sodium chloride 0.9 % bolus 250 mL 250 mL Once 3/1/2018 3/1/2018    Sig - Route: Infuse 250 mL into a venous " catheter 1 (One) Time. - Intravenous    sodium chloride 0.9 % flush 10 mL 10 mL As Needed 3/1/2018     Sig - Route: Infuse 10 mL into a venous catheter As Needed for Line Care. - Intravenous    Cosign for Ordering: Accepted by Jordan Herron MD on 3/1/2018  4:28 PM    valsartan (DIOVAN) tablet 160 mg 160 mg Once 3/2/2018 3/2/2018    Sig - Route: Take 1 tablet by mouth 1 (One) Time. - Oral    valsartan (DIOVAN) tablet 320 mg 320 mg Daily 3/3/2018     Sig - Route: Take 1 tablet by mouth Daily. - Oral    vancomycin 1250 mg/250 mL 0.9% NS IVPB ([facility]) 15 mg/kg × 82.1 kg Every 12 Hours 3/4/2018 3/14/2018    Sig - Route: Infuse 250 mL into a venous catheter Every 12 (Twelve) Hours. - Intravenous    vancomycin 500 mg/100 mL 0.9% NS IVPB ([facility]) 500 mg Once 3/6/2018 3/6/2018    Sig - Route: Infuse 100 mL into a venous catheter 1 (One) Time. - Intravenous    atorvastatin (LIPITOR) tablet 10 mg (Discontinued) 10 mg Daily 3/2/2018 3/6/2018    Sig - Route: Take 1 tablet by mouth Daily. - Oral    enoxaparin (LOVENOX) syringe 40 mg (Discontinued) 40 mg Every 24 Hours 3/1/2018 3/1/2018    Sig - Route: Inject 0.4 mL under the skin Daily. - Subcutaneous    enoxaparin (LOVENOX) syringe 40 mg (Discontinued) 40 mg Every 24 Hours 3/3/2018 3/8/2018    Sig - Route: Inject 0.4 mL under the skin Daily. - Subcutaneous    enoxaparin (LOVENOX) syringe 80 mg (Discontinued) 1 mg/kg × 83 kg Every 12 Hours 3/1/2018 3/2/2018    Sig - Route: Inject 0.8 mL under the skin Every 12 (Twelve) Hours. - Subcutaneous    haloperidol (HALDOL) tablet 1 mg (Discontinued) 1 mg Every 6 Hours PRN 3/11/2018 3/11/2018    Sig - Route: Take 1 tablet by mouth Every 6 (Six) Hours As Needed for Agitation. - Oral    levETIRAcetam in NaCl 0.75% (KEPPRA) IVPB 1,000 mg (Discontinued) 1,000 mg Every 12 Hours Scheduled 3/4/2018 3/8/2018    Sig - Route: Infuse 100 mL into a venous catheter Every 12 (Twelve) Hours. - Intravenous    metoprolol succinate XL (TOPROL-XL) 24 hr tablet 25  mg (Discontinued) 25 mg Every 24 Hours Scheduled 3/2/2018 3/6/2018    Sig - Route: Take 1 tablet by mouth Daily. - Oral    metoprolol succinate XL (TOPROL-XL) 24 hr tablet 37.5 mg (Discontinued) 37.5 mg Every 24 Hours Scheduled 3/7/2018 3/7/2018    Sig - Route: Take 1.5 tablets by mouth Daily. - Oral    Pharmacy to Dose enoxaparin (LOVENOX) (Discontinued)  Continuous PRN 3/2/2018 3/10/2018    Sig - Route: Continuous As Needed for Consult. - Does not apply    Reason for Discontinue: *Therapy completed    piperacillin-tazobactam (ZOSYN) 3.375 g in iso-osmotic dextrose 50 ml (premix) (Discontinued) 3.375 g Every 8 Hours 3/5/2018 3/5/2018    Sig - Route: Infuse 50 mL into a venous catheter Every 8 (Eight) Hours. - Intravenous    ramipril (ALTACE) capsule 10 mg (Discontinued) 10 mg Every 12 Hours Scheduled 3/2/2018 3/2/2018    Sig - Route: Take 1 capsule by mouth Every 12 (Twelve) Hours. - Oral    sodium chloride 0.9 % infusion (Discontinued) 125 mL/hr Continuous 3/1/2018 3/1/2018    Sig - Route: Infuse 125 mL/hr into a venous catheter Continuous. - Intravenous    sodium chloride 0.9 % infusion (Discontinued) 75 mL/hr Continuous 3/1/2018 3/3/2018    Sig - Route: Infuse 75 mL/hr into a venous catheter Continuous. - Intravenous    valsartan (DIOVAN) tablet 160 mg (Discontinued) 160 mg Daily 3/2/2018 3/2/2018    Sig - Route: Take 1 tablet by mouth Daily. - Oral    vancomycin (VANCOCIN) in iso-osmotic dextrose IVPB 1 g (premix) 200 mL (Discontinued) 1,000 mg Every 12 Hours 3/4/2018 3/4/2018    Sig - Route: Infuse 200 mL into a venous catheter Every 12 (Twelve) Hours. - Intravenous    Reason for Discontinue: Dose adjustment             Physician Progress Notes (last 72 hours) (Notes from 3/9/2018 12:17 PM through 3/12/2018 12:17 PM)      Elijah Gillis MD at 3/12/2018 12:14 PM               LOS: 11 days     Name: Krzysztof Ward  Age/Sex: 88 y.o. male  :  1929        PCP: Willie Ramirez MD    Subjective   Feels  ok.  No new issues.  Family and patient upset regarding the lack of physical therapy  General: No Fever or Chills, Cardiac: No Chest Pain or Palpitations, Resp: No Cough or SOA, GI: No Nausea, Vomiting, or Diarrhea and Other: No bleeding      artificial tears  Left Eye Nightly   aspirin 81 mg Oral Daily   cefepime 2 g Intravenous Q12H   famotidine 20 mg Oral Daily   levETIRAcetam 1,000 mg Oral Q12H   metoprolol succinate XL 50 mg Oral Q24H   valsartan 320 mg Oral Daily   vancomycin 15 mg/kg Intravenous Q12H       Pharmacy to dose vancomycin        Objective   Vital Signs  Temp:  [98.1 °F (36.7 °C)-98.5 °F (36.9 °C)] 98.5 °F (36.9 °C)  Heart Rate:  [56-67] 56  Resp:  [16] 16  BP: (174-194)/(59-80) 174/59  Body mass index is 24.78 kg/m².    Intake/Output Summary (Last 24 hours) at 03/12/18 1214  Last data filed at 03/12/18 1037   Gross per 24 hour   Intake              320 ml   Output             2950 ml   Net            -2630 ml       Physical Exam   Constitutional: He appears well-developed and well-nourished.   HENT:   Head: Atraumatic.   Nose: Nose normal.   Eyes: Conjunctivae and EOM are normal.   Neck: Neck supple. JVD present.   Cardiovascular: Normal rate, regular rhythm and normal heart sounds.    Pulmonary/Chest: Effort normal and breath sounds normal.   Abdominal: Soft. Bowel sounds are normal.   Neurological: He is alert.   Skin: Skin is warm.   Nursing note and vitals reviewed.        Results Review:       I reviewed the patient's new clinical results.    Results from last 7 days  Lab Units 03/11/18  0701 03/06/18  0821   WBC 10*3/mm3 11.97* 10.78*   HEMOGLOBIN g/dL 12.2* 11.8*   PLATELETS 10*3/mm3 342 333       Results from last 7 days  Lab Units 03/11/18  0701 03/09/18  0843 03/06/18  0821 03/05/18  2140   SODIUM mmol/L 127*  --  127*  --    POTASSIUM mmol/L 4.0  --  3.8 4.5   CHLORIDE mmol/L 88*  --  88*  --    CO2 mmol/L 28.0  --  27.6  --    BUN mg/dL 14  --  18  --    CREATININE mg/dL 0.61* 0.68*  0.54*  --    CALCIUM mg/dL 9.1  --  8.9  --    MAGNESIUM mg/dL  --   --   --  1.8   PHOSPHORUS mg/dL  --   --  2.7  --    Estimated Creatinine Clearance: 74.8 mL/min (by C-G formula based on SCr of 0.61 mg/dL (L)).      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 male  :  1929        PCP: Willie Ramirez MD    Subjective   Feels ok.  No new issues.  Family and patient upset regarding the lack of physical therapy  General: No Fever or Chills, Cardiac: No Chest Pain or Palpitations, Resp: No Cough or SOA, GI: No Nausea, Vomiting, or Diarrhea and Other: No bleeding      artificial tears  Left Eye Nightly   aspirin 81 mg Oral Daily   cefepime 2 g Intravenous Q12H   famotidine 20 mg Oral Daily   levETIRAcetam 1,000 mg Oral Q12H   metoprolol succinate XL 50 mg Oral Q24H   valsartan 320 mg Oral Daily   vancomycin 15 mg/kg Intravenous Q12H       Pharmacy to dose vancomycin        Objective   Vital Signs  Temp:  [96.9 °F (36.1 °C)-97.4 °F (36.3 °C)] 97.4 °F (36.3 °C)  Heart Rate:  [57-70] 70  Resp:  [16] 16  BP: (167-201)/(67-80) 201/80  Body mass index is 24.78 kg/m².    Intake/Output Summary (Last 24 hours) at 18 0826  Last data filed at 18 0515   Gross per 24 hour   Intake              100 ml   Output             1800 ml   Net            -1700 ml       Physical Exam    Constitutional: He appears well-developed and well-nourished.   HENT:   Head: Atraumatic.   Nose: Nose normal.   Eyes: Conjunctivae and EOM are normal.   Neck: Neck supple. JVD present.   Cardiovascular: Normal rate, regular rhythm and normal heart sounds.    Pulmonary/Chest: Effort normal and breath sounds normal.   Abdominal: Soft. Bowel sounds are normal.   Neurological: He is alert.   Skin: Skin is warm.   Nursing note and vitals reviewed.        Results Review:       I reviewed the patient's new clinical results.    Results from last 7 days  Lab Units 03/11/18  0701 03/06/18  0821 03/05/18  0548   WBC 10*3/mm3 11.97* 10.78* 8.47   HEMOGLOBIN g/dL 12.2* 11.8* 10.8*   PLATELETS 10*3/mm3 342 333 281     Results from last 7 days  Lab Units 03/11/18  0701 03/09/18  0843 03/06/18  0821 03/05/18  2140 03/05/18  0548 03/04/18  1600 03/04/18  1210   SODIUM mmol/L 127*  --  127*  --  126* 124* 125*   POTASSIUM mmol/L 4.0  --  3.8 4.5 3.8  --  3.5   CHLORIDE mmol/L 88*  --  88*  --  91*  --  88*   CO2 mmol/L 28.0  --  27.6  --  25.6  --  25.2   BUN mg/dL 14  --  18  --  19  --  17   CREATININE mg/dL 0.61* 0.68* 0.54*  --  0.70*  --  0.66*   CALCIUM mg/dL 9.1  --  8.9  --  8.9  --  8.7   MAGNESIUM mg/dL  --   --   --  1.8 1.9  --   --    PHOSPHORUS mg/dL  --   --  2.7  --  3.2  --   --    Estimated Creatinine Clearance: 74.8 mL/min (by C-G formula based on SCr of 0.61 mg/dL (L)).      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    Urinary retention      PLAN  - continue FC  - labs stable  - plan rehab Monday awaiting precert      Disposition        Elijah Gillis MD  [facility]  03/11/18  8:26 AM            Electronically signed by Elijah Gillis MD at  3/11/2018  3:04 PM     Fern Hart MD at 3/10/2018  6:16 PM               LOS: 9 days   Patient Care Team:  Willie Ramirez MD as PCP - General (Family Medicine)  Karla Bush MD as Consulting Physician (Radiation Oncology)  Deb Amaro MD as Consulting Physician (Ophthalmology)  Tushar Núñez MD as Consulting Physician (Otolaryngology)    Chief Complaint   Patient presents with   • Fall     0230 this morning, son found this morning.          Subjective   Daughter reports that pt has been c/o'ing feeling like he needs to urinate even though the latif is in. Tania I asked the pt about this he denied it. Also denied any pain, nausea or SOA.    Objective     Vital Signs  Temp:  [97.8 °F (36.6 °C)] 97.8 °F (36.6 °C)  Heart Rate:  [60] 60  Resp:  [16] 16  BP: (195)/(73) 195/73    Physical Exam:  WDWN male in NAD. Initially seemed oriented but then he started saying things that didn't make any sense  Left facial droop present  Lungs clear with equal BS  Heart irreg  Abd soft, NT, BS+  Ext no edema.  Skin warm & dry       Results Review:     I reviewed the patient's new clinical results.    Medication Review:       LABS    Results from last 7 days  Lab Units 03/09/18  0843 03/06/18  0821 03/05/18  2140 03/05/18  0548 03/04/18  1600 03/04/18  1210   SODIUM mmol/L  --  127*  --  126* 124* 125*   POTASSIUM mmol/L  --  3.8 4.5 3.8  --  3.5   CHLORIDE mmol/L  --  88*  --  91*  --  88*   CO2 mmol/L  --  27.6  --  25.6  --  25.2   BUN mg/dL  --  18  --  19  --  17   CREATININE mg/dL 0.68* 0.54*  --  0.70*  --  0.66*   GLUCOSE mg/dL  --  114*  --  94  --  120*   CALCIUM mg/dL  --  8.9  --  8.9  --  8.7       Results from last 7 days  Lab Units 03/06/18  0821 03/05/18  0548 03/04/18  0546   WBC 10*3/mm3 10.78* 8.47 10.76*   HEMOGLOBIN g/dL 11.8* 10.8* 11.2*   HEMATOCRIT % 34.8* 32.7* 33.1*   PLATELETS 10*3/mm3 333 281 322               Assessment/Plan     Active Problems:    Hypertension    Squamous cell  carcinoma of skin of left ear and external auditory canal - plan rehab    Atrial flutter    Hyponatremia    Fall    UTI (urinary tract infection)    Non-traumatic rhabdomyolysis - resolved    Brain abscess    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          Subjective   Doing ok. No nausea. Trying to eat. No SOA. No changes    Objective     Vital Signs  Temp:  [97.1 °F (36.2 °C)] 97.1 °F (36.2 °C)  Heart Rate:  [58] 58  Resp:  [18] 18  BP: (173)/(67) 173/67    Physical Exam:  WDWN male in NAD. Alert & oriented.  Left facial droop present  Lungs clear with equal BS  Heart irreg  Abd soft, NT, BS+  Ext no edema.  Skin warm & dry       Results Review:     I reviewed the patient's new clinical results.    Medication Review:       LABS    Results from last 7 days  Lab Units 03/09/18  0843 03/06/18  0821 03/05/18  2140 03/05/18  0548 03/04/18  1600 03/04/18  1210   SODIUM mmol/L  --  127*  --  126* 124* 125*   POTASSIUM mmol/L  --  3.8 4.5 3.8  --  3.5   CHLORIDE mmol/L  --  88*  --  91*  --  88*   CO2 mmol/L  --  27.6  --  25.6  --  25.2   BUN mg/dL  --  18  --  19  --  17   CREATININE mg/dL 0.68* 0.54*  --  0.70*  --  0.66*   GLUCOSE mg/dL  --  114*  --  94  --  120*   CALCIUM mg/dL  --  8.9  --  8.9  --  8.7       Results from last 7 days  Lab Units 03/06/18  0821 03/05/18  0548 03/04/18  0546   WBC  10*3/mm3 10.78* 8.47 10.76*   HEMOGLOBIN g/dL 11.8* 10.8* 11.2*   HEMATOCRIT % 34.8* 32.7* 33.1*   PLATELETS 10*3/mm3 333 281 322               Assessment/Plan     Active Problems:    Hypertension    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

## 2018-03-12 NOTE — PLAN OF CARE
Problem: Fall Risk (Adult)  Goal: Identify Related Risk Factors and Signs and Symptoms  Outcome: Ongoing (interventions implemented as appropriate)    Goal: Absence of Fall  Outcome: Ongoing (interventions implemented as appropriate)      Problem: Patient Care Overview  Goal: Plan of Care Review  Outcome: Ongoing (interventions implemented as appropriate)   03/12/18 1548   Coping/Psychosocial   Plan of Care Reviewed With patient   Plan of Care Review   Progress no change   OTHER   Outcome Summary pt was confused and restless, pulling at FC last night per HS RN. today pt sleepy, and alert to person. pt given PO pain medsx1 for back pain. Pt still on ABX IV. awaiting precert for rehab. continue to monitor.     Goal: Individualization and Mutuality  Outcome: Ongoing (interventions implemented as appropriate)    Goal: Discharge Needs Assessment  Outcome: Ongoing (interventions implemented as appropriate)    Goal: Interprofessional Rounds/Family Conf  Outcome: Ongoing (interventions implemented as appropriate)      Problem: Skin Injury Risk (Adult)  Goal: Skin Health and Integrity  Outcome: Ongoing (interventions implemented as appropriate)

## 2018-03-12 NOTE — PLAN OF CARE
Problem: Fall Risk (Adult)  Goal: Absence of Fall  Outcome: Ongoing (interventions implemented as appropriate)      Problem: Patient Care Overview  Goal: Plan of Care Review  Outcome: Ongoing (interventions implemented as appropriate)   03/11/18 2016 03/12/18 0552   Coping/Psychosocial   Plan of Care Reviewed With patient --    Plan of Care Review   Progress --  no change   OTHER   Outcome Summary --  Patient was agitated last night. Patient pulled his IV and tried to pull his latif cath. Patient was medicated with Haldol once. Son went home for the night. Patient worked with PT yesterday. Patient was medicated for pain x1. Maintained comfort measures per palliative care protocol. Will continue to monitor vital signs and comfort.       Problem: Skin Injury Risk (Adult)  Goal: Skin Health and Integrity  Outcome: Ongoing (interventions implemented as appropriate)

## 2018-03-12 NOTE — PROGRESS NOTES
"Pharmacokinetic Evaluation - Vancomycin    Krzysztof Ward is a 88 y.o. male on vancomycin pharmacy to dose.  MRN: [id number]  : 1929    Day of vancomycin therapy: 8  Indication: cns infection  Consulted by: Dr. English; ID consulted   Goal trough: 15-20    Current dose: 1250mg q12h  Other antimicrobials: cefepime 2g q12h    Blood pressure 174/59, pulse 56, temperature 98.5 °F (36.9 °C), temperature source Oral, resp. rate 16, height 182.9 cm (72\"), weight 82.9 kg (182 lb 11.2 oz), SpO2 96 %.    Results from last 7 days  Lab Units 18  0701 18  0843 18  0821   CREATININE mg/dL 0.61* 0.68* 0.54*     Estimated Creatinine Clearance: 74.8 mL/min (by C-G formula based on SCr of 0.61 mg/dL (L)).    Results from last 7 days  Lab Units 18  0701 18  0821   WBC 10*3/mm3 11.97* 10.78*   HEMOGLOBIN g/dL 12.2* 11.8*   HEMATOCRIT % 37.2* 34.8*   PLATELETS 10*3/mm3 342 333     Baseline culture/source/susceptibility:   3/1 UC 50k strep mitis sens to PCN G and Vancomycin  3/4 BC Negative    Imaging:  3/4 MRI brain:  showing left temporal abscess/empyema with moderate edema - per Dr. English      Labs:  3/1 PCT 0.05  3/5 CRP 5.38      Recent Vancomycin dosing history:  3/4 215 1250mg IV q12h (~15.5mg/kg)  3/6 0821 Vancomycin trough 12.7 mcg/mL - 500 mg additional bolus 3/6 1310  3/9 Trough level = 18 @0843; appropriately drawn at steady state (5 doses given since bolus on 3/6)  3/12 Trough level = 17.4; no changes made since additional bolus on 3/6    Assessment:  Renal function had been stable and patient with trough level within goal range of 15-20 mg/L. Pt to possibly go to [facility] for therapy service with Hospice to follow soon - per Dr. Blunt's note on 3/8 plan to change to PO Augmentin upon d/c. Given the anticipated D/C in the next several days, will not pursue any further levels. Will recheck Scr on [place] 3/11 if patient still admitted.    Plan:  1) Continue vancomycin 1250 " mg every 12 hours.  2) Pt to continue on vancomycin until d/c to SNF; precert pending. No plans for further levels unless change in SCr, UOP or other signs of toxicity.  3) Monitor for s/sxns of vancomycin toxicity including changes in UOP/SCr; encourage hydration as tolerated to decrease risk of toxic accumulation. SCr planned again for 3/14 if still admitted.    Janice Velasquez, Pharm.D., [facility]  Oncology Pharmacy Specialist  [phone]

## 2018-03-13 NOTE — SIGNIFICANT NOTE
03/13/18 1127   Rehab Treatment   Discipline physical therapy assistant   Reason Treatment Not Performed (Pt to d/c to SNF today)

## 2018-03-13 NOTE — PROGRESS NOTES
[address] [phone]    Physicians Statement of Medical Necessity for Ambulance Transportation    It is medically necessary for:    Patient Name: Krzysztof Ward    Insurance Information:  [employer] Medicare replacement #ID [id number]     [facility] 3[facility]13/18 @ 13:00    From (if nursing facility, specify level of care: skilled, long-term, etc): [facility]    To (specify level of care if nursing facility): [facility]    Date of Service: 3/1/18---3/13/18    For dialysis patients state date dialysis began: N/A    Diagnosis: Fall    Past Medical/Surgical History:  Past Medical History:   Diagnosis Date   • H/O degenerative disc disease    • Hypertension    • Osteoporosis    • Squamous cell cancer of external ear, left       Past Surgical History:   Procedure Laterality Date   • BACK SURGERY     • EXTERNAL EAR SURGERY     • REPLACEMENT TOTAL KNEE Left         Current Objective Medical Evidence(including physical exam finding to support reason for limitations):    Bedridden    Other: Fall    Physician Signature:           (RN,NP,PA,CAN, Discharge Planner) Date/Time: 3/13/18 @ 13:00     Printed Name:    __________________________________    [facility]   Phone: [phone] Phone: [phone] Phone: [phone]   Fax: [phone] Fax: [phone] Fax: [phone]

## 2018-03-13 NOTE — PROGRESS NOTES
Discharge Planning Assessment  [facility]     Patient Name: Krzysztof Ward  MRN: [id number]  Today's Date: 3/13/2018    Admit Date: 3/1/2018          Discharge Needs Assessment    No documentation.             Discharge Plan     Row Name 03/13/18 1033       Plan    Plan Comments Per Cyn/Eliseo Pan they received pre-auth for skilled day and they can accept today. Notified Dr. Gillis of the above. The patient is discharged today to [facility] at 13:00. Reviewed with the patient's son and is agreeable to discharge plans. DEJUAN Bashir RN    Final Discharge Disposition Code 03 - skilled nursing facility (SNF)    Final Note The patient is discharged today to [facility] skilled bed [facility] at 13:00. DEJUAN Bashir RN, CCP        Destination - Selection Complete     Service Request Status Selected Specialties Address Phone Number Fax Number    [facility] Selected Skilled Nursing Facility [address] [id number] [phone]      Durable Medical Equipment     No service coordination in this encounter.      Dialysis/Infusion     No service coordination in this encounter.      Home Medical Care     No service coordination in this encounter.      Social Care     No service coordination in this encounter.        Expected Discharge Date and Time     Expected Discharge Date Expected Discharge Time    Mar 13, 2018               Demographic Summary    No documentation.           Functional Status    No documentation.           Psychosocial    No documentation.           Abuse/Neglect    No documentation.           Legal    No documentation.           Substance Abuse    No documentation.           Patient Forms    No documentation.         Padmini Bashir RN

## 2018-03-13 NOTE — PROGRESS NOTES
Case Management Discharge Note    Final Note: The patient is discharged today to [facility] at 13:00. DEJUAN Bashir RN, CCP    Destination - Selection Complete     Service Request Status Selected Specialties Address Phone Number Fax Number    [facility] Selected Skilled Nursing Facility [address] [facility] [address] [phone]      Durable Medical Equipment     No service coordination in this encounter.      Dialysis/Infusion     No service coordination in this encounter.      Home Medical Care     No service coordination in this encounter.      Social Care     No service coordination in this encounter.        [facility]: [facility]    Final Discharge Disposition Code: 03 - skilled nursing facility (SNF)

## 2018-03-13 NOTE — DISCHARGE SUMMARY
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    Urinary retention      SECONDARY DIAGNOSES  Past Medical History:   Diagnosis Date   • H/O degenerative disc disease    • Hypertension    • Osteoporosis    • Squamous cell cancer of external ear, left        CONSULTS   Consults     Date and Time Order Name Status Description    3/4/2018 2009 Inpatient ENT Consult      3/4/2018 2000 Inpatient Infectious Diseases Consult Completed     3/4/2018 2000 Inpatient Neurosurgery Consult Completed     3/4/2018 0752 Inpatient Nephrology Consult Completed     3/1/2018 2113 Inpatient Cardiology Consult Completed     3/1/2018 1847 Inpatient ENT Consult      3/1/2018 1433 [facility] (on-call MD unless specified) Completed           PROCEDURES PERFORMED  MRI Brain With & Without Contrast   Final Result   There is edema involving the inferior aspect of the left temporal lobe   with the area of edema involving the cortex and subcortical white   matter. This area measures 4.1 cm x  2.3 cm in AP and transverse   dimensions respectively. There are small peripherally enhancing foci   involving the floor of the left middle cranial fossa worrisome for   empyemas. There is a larger collection more medially which may represent   a large empyema although a superficial cortical abscess involving the   inferior aspect of the left temporal lobe cannot be excluded. An   intraparenchymal abscess is less likely. There is extensive enhancement   involving the mastoid air cells on the left corresponding to the lytic   process noted on the CT examination of 03/01/2018. The appearance is   nonspecific. This may represent an  extensive and aggressive mastoiditis,   although tumor with superimposed infection could not be excluded. There   is enhancement involving the temporomandibular joint on the left with   fluid anterior to the left temporomandibular joint. An infected   collection could not be excluded.       The above information was called to and discussed with Dr. Muir on   3/4/2018 at 1951 hours.       This report was finalized on 3/6/2018 7:38 AM by Dr. Elijah Chang MD.          CT Head Without Contrast   Final Result       There is no evidence for an acute traumatic abnormality to the head or   maxillofacial region. However, there is a known squamous cell carcinoma   of the left ear and external auditory canal. A portion of the posterior   aspect of the left pinna has been resected. The left external auditory   canal is completely opacified. Also, there is complete opacification of   the left mastoid air cells and middle ear cavity. There is erosion of   the left tegmen tympani and there is a large bony defect along the floor   of the left middle cranial fossa as a result. Furthermore, there is   erosion of the posterior and superior wall of the left temporal   mandibular joint. A soft tissue density abnormality is appreciated at   the site of the erosion of the tegmen tympani. A fluid density   abnormality is seen just anterior to the left mandibular condyle. The   findings may represent some combination of tumor along with fluid within   the left temporal bone. The possibility of a superimposed infection   could not be excluded. The fluid density anterior to the left   temporomandibular joint once again could be noninfected fluid or   purulent material. Ultimately, I recommend performing a dedicated MRI of   the maxillofacial region as well as an MRI of the brain with and without   contrast to further characterize these abnormalities and to define the   extent of intracranial abnormalities as a consequence of this pathology.        These findings and recommendations were discussed with Dr. Herron on   03/01/2018 at approximately 2:30 PM.       Radiation dose reduction techniques were utilized, including automated   exposure control and exposure modulation based on body size.       This report was finalized on 3/2/2018 9:28 AM by Dr. Ray Moncada MD.          CT Facial Bones Without Contrast   Final Result       There is no evidence for an acute traumatic abnormality to the head or   maxillofacial region. However, there is a known squamous cell carcinoma   of the left ear and external auditory canal. A portion of the posterior   aspect of the left pinna has been resected. The left external auditory   canal is completely opacified. Also, there is complete opacification of   the left mastoid air cells and middle ear cavity. There is erosion of   the left tegmen tympani and there is a large bony defect along the floor   of the left middle cranial fossa as a result. Furthermore, there is   erosion of the posterior and superior wall of the left temporal   mandibular joint. A soft tissue density abnormality is appreciated at   the site of the erosion of the tegmen tympani. A fluid density   abnormality is seen just anterior to the left mandibular condyle. The   findings may represent some combination of tumor along with fluid within   the left temporal bone. The possibility of a superimposed infection   could not be excluded. The fluid density anterior to the left   temporomandibular joint once again could be noninfected fluid or   purulent material. Ultimately, I recommend performing a dedicated MRI of   the maxillofacial region as well as an MRI of the brain with and without   contrast to further characterize these abnormalities and to define the   extent of intracranial abnormalities as a consequence of this pathology.       These findings and recommendations were discussed with Dr. Herron on   03/01/2018 at approximately 2:30 PM.        Radiation dose reduction techniques were utilized, including automated   exposure control and exposure modulation based on body size.       This report was finalized on 3/2/2018 9:28 AM by Dr. Ray Moncada MD.          XR Chest 1 View   Final Result   Minimal likely scar atelectasis at the right base. Tortuous   aorta. Follow-up as clinically indicated.       This report was finalized on 3/1/2018 1:27 PM by Dr. Trace Ng MD.                HOSPITAL COURSE  Patient is a 88 y.o. male presented to [facility] complaining of weakness and falls.  Please see the admitting history and physical for further details.  In the hospital with new onset atrial flutter and a urinary tract infection.  He was started on Rocephin for the UTI and culture growing Streptococcus mitis.  Cardiology was consulted for assistance with the atrial flutter but his rate was controlled.  Given his active ongoing malignancy they recommended against anticoagulation and started him on a low-dose beta blocker.  His rate remained controlled he's been in and out of atrial flutter throughout the course of hospitalization.  He developed significant hyponatremia with labs consistent with SIADH.  Nephrology was consulted and he was seen by Dr. White.  They recommended fluid restriction and his sodium is stabilized.  It still remains on a little low but has stabilized we've recommended continuation of the fluid restriction.  The course for hospitalization remained very weak and still had this left-sided facial droop.  It appeared to worsen day-to-day and there was concern that he may developed a new stroke given the new a flutter.  His MRI was reviewed from [facility] was negative for any infarction or ischemia.  An MRI was repeated here showed a temporal lobe abscess and empyema and he was started on IV antibiotics.  Infectious disease and ENT were consulted for assistance along with neurosurgery.  He was seen and  "evaluated by Dr. Blunt, Dr Wade, and Dr Núñez.  A CT scan showed advancement of the cancer along the temporal bone and lots of involvement within the left side of the face.  This was the likely etiology of his progressive left facial droop.  Neurosurgery ENT discussed with the family and they elected against surgical intervention.  Recommendations from the surgical team were to pursue hospice and palate care.  Infectious disease recommended broad-spectrum antibiotics while inpatient with transition to oral antibiotics at discharge.  His labs have remained stable his pains been controlled and has been improving a little bit with physical therapy each day.  The plan at this point is for him to transition to a skilled nursing facility to hopefully regain some strength and mobility before transitioning home with hospice.  He'll be on the oral Augmentin until March 28 at that point the antibiotics can be discontinued.  I discussed the plan with the family and questions of been answered.  At this point the patient stable for transition to a skilled nursing facility today.    CONDITION ON DISCHARGE  Stable.      VITAL SIGNS  /72 (BP Location: Left arm, Patient Position: Lying) Comment: pt kept arm bent  Pulse 64   Temp 98 °F (36.7 °C) (Oral)   Resp 16   Ht 182.9 cm (72\")   Wt 82.9 kg (182 lb 11.2 oz)   SpO2 95%   BMI 24.78 kg/m²   Objective:  General Appearance:  Comfortable (He has a left facial droop and an obvious abnormality on the left ear from previous surgery.).    Vital signs: (most recent): Blood pressure 175/72, pulse 64, temperature 98 °F (36.7 °C), temperature source Oral, resp. rate 16, height 182.9 cm (72\"), weight 82.9 kg (182 lb 11.2 oz), SpO2 95 %.  Vital signs are normal.    HEENT: (Left facial droop and abnormality on the left ear noted)    Lungs:  Normal effort.  Breath sounds clear to auscultation.    Heart: Normal rate.  S1 normal.    Abdomen: Abdomen is soft.  Bowel sounds are " normal.   There is no abdominal tenderness.     Pulses: Distal pulses are intact.    Neurological: Patient is alert and oriented to person, place and time.    Pupils:  Pupils are equal, round, and reactive to light.    Skin:  Warm and dry.                DISCHARGE DISPOSITION         DISCHARGE MEDICATIONS   Krzysztof Ward   Home Medication Instructions MARJ:[id number]    Printed on:03/13/18 1010   Medication Information                      alendronate (FOSAMAX) 70 MG tablet  TAKE 1 TABLET EVERY 7 DAYS             famotidine (PEPCID) 20 MG tablet  Take by mouth daily.             Glucosamine HCl 500 MG tablet  Take 500 mg by mouth Daily.             HYDROcodone-acetaminophen (NORCO) 7.5-325 MG per tablet  Take 1 tablet by mouth Every 8 (Eight) Hours As Needed for Moderate Pain .             Multiple Vitamin (MULTI-VITAMIN) tablet  Take 1 tablet by mouth Daily.             ramipril (ALTACE) 10 MG capsule  TAKE 2 CAPSULES DAILY             senna-docusate (PERICOLACE) 8.6-50 MG per tablet  Take 1 tablet by mouth As Needed.             simvastatin (ZOCOR) 20 MG tablet  Take 1 tablet by mouth Every Night.             valsartan (DIOVAN) 160 MG tablet  Take 1 tablet by mouth Daily.                Future Appointments  Date Time Provider Department Center   4/2/2018 10:20 AM Willie Ramirez MD [facility] None   4/16/2018 1:30 PM ADAM CT 2  ADAM CT [facility]     Follow-up Information     Eddy العلي MD. Schedule an appointment as soon as possible for a visit in 4 week(s).    Specialty:  Cardiology  Contact information:  [facility] [address] [address] [phone]                   TEST  RESULTS PENDING AT DISCHARGE         Elijah Gillis MD  [facility]  03/13/18  10:10 AM      Time: greater than 30 minutes.

## 2018-03-13 NOTE — PLAN OF CARE
Problem: Patient Care Overview  Goal: Plan of Care Review   03/12/18 1548 03/12/18 2034 03/13/18 0607   Coping/Psychosocial   Plan of Care Reviewed With --  patient --    Plan of Care Review   Progress no change --  --    OTHER   Outcome Summary --  --  Patient slept most of the night. Got restless this morning and pulled his IV out. No complaints all night. Patient is on IV antibiotics. Awaiting precert for rehab. No family at bedside. Will continue to monitor vital signs and comfort.     Goal: Discharge Needs Assessment  Outcome: Ongoing (interventions implemented as appropriate)      Problem: Skin Injury Risk (Adult)  Goal: Skin Health and Integrity  Outcome: Ongoing (interventions implemented as appropriate)

## 2018-03-19 NOTE — PAYOR COMM NOTE
"Krzysztof Harrington (88 y.o. Male)     ATTN: RYAN  REF#[id number]   D/C SUMMARY  THANKS!  BIBI SWENSON@[phone] OR [phone]      Date of Birth Social Security Number Address Home Phone MRN    09/02/1929  [address] [id number] [phone] [id number]    Yarsani Marital Status          [facility]        Admission Date Admission Type Admitting Provider Attending Provider Department, Room/Bed    3/1/18 Emergency Fern Hart MD  [address], P492/1    Discharge Date Discharge Disposition Discharge Destination        3/13/2018 Skilled Nursing Facility ([place] - External)              Attending Provider:  (none)   Allergies:  No Known Allergies    Isolation:  None   Infection:  None   Code Status:  Prior    Ht:  182.9 cm (72\")   Wt:  82.9 kg (182 lb 11.2 oz)    Admission Cmt:  None   Principal Problem:  None                Active Insurance as of 3/1/2018     Primary Coverage     Payor Plan Insurance Group Employer/Plan Group    AETNA MEDICARE REPLACEMENT AETNA [id number]     Payor Plan Address Payor Plan Phone Number Effective From Effective To    [address] [phone] 1/1/2018     [place] [address]       Subscriber Name Subscriber Birth Date Member ID       KRZYSZTOF HARRINGTON 9/2/1929 [facility]                 Emergency Contacts      (Rel.) Home Phone Work Phone Mobile Phone    Krzysztof Harrington Jr (Son) -- [phone]    BrianSabina (Daughter) -- -- [phone]    Brannon Harrington (Son) -- -- [phone]               Discharge Summary      Elijah Gillis MD at 3/13/2018 10:10 AM                 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    Atrial flutter    Hyponatremia    Fall    UTI (urinary tract infection)    Non-traumatic rhabdomyolysis    Brain " abscess    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    Urinary retention      SECONDARY DIAGNOSES  Past Medical History:   Diagnosis Date   • H/O degenerative disc disease    • Hypertension    • Osteoporosis    • Squamous cell cancer of external ear, left        CONSULTS   Consults     Date and Time Order Name Status Description    3/4/2018 2009 Inpatient ENT Consult      3/4/2018 2000 Inpatient Infectious Diseases Consult Completed     3/4/2018 2000 Inpatient Neurosurgery Consult Completed     3/4/2018 0752 Inpatient Nephrology Consult Completed     3/1/2018 2113 Inpatient Cardiology Consult Completed     3/1/2018 1847 Inpatient ENT Consult      3/1/2018 1433 [facility] (on-call MD unless specified) Completed           PROCEDURES PERFORMED  MRI Brain With & Without Contrast   Final Result   There is edema involving the inferior aspect of the left temporal lobe   with the area of edema involving the cortex and subcortical white   matter. This area measures 4.1 cm x  2.3 cm in AP and transverse   dimensions respectively. There are small peripherally enhancing foci   involving the floor of the left middle cranial fossa worrisome for   empyemas. There is a larger collection more medially which may represent   a large empyema although a superficial cortical abscess involving the   inferior aspect of the left temporal lobe cannot be excluded. An   intraparenchymal abscess is less likely. There is extensive enhancement   involving the mastoid air cells on the left corresponding to the lytic   process noted on the CT examination of 03/01/2018. The appearance is   nonspecific. This may represent an extensive and aggressive mastoiditis,   although tumor with superimposed infection could not be excluded. There   is enhancement involving the temporomandibular joint on the left with   fluid anterior to the left temporomandibular joint. An infected   collection could not be excluded.       The above information was called to and discussed with  Dr. Muir on   3/4/2018 at 1951 hours.       This report was finalized on 3/6/2018 7:38 AM by Dr. Elijah Chang MD.          CT Head Without Contrast   Final Result       There is no evidence for an acute traumatic abnormality to the head or   maxillofacial region. However, there is a known squamous cell carcinoma   of the left ear and external auditory canal. A portion of the posterior   aspect of the left pinna has been resected. The left external auditory   canal is completely opacified. Also, there is complete opacification of   the left mastoid air cells and middle ear cavity. There is erosion of   the left tegmen tympani and there is a large bony defect along the floor   of the left middle cranial fossa as a result. Furthermore, there is   erosion of the posterior and superior wall of the left temporal   mandibular joint. A soft tissue density abnormality is appreciated at   the site of the erosion of the tegmen tympani. A fluid density   abnormality is seen just anterior to the left mandibular condyle. The   findings may represent some combination of tumor along with fluid within   the left temporal bone. The possibility of a superimposed infection   could not be excluded. The fluid density anterior to the left   temporomandibular joint once again could be noninfected fluid or   purulent material. Ultimately, I recommend performing a dedicated MRI of   the maxillofacial region as well as an MRI of the brain with and without   contrast to further characterize these abnormalities and to define the   extent of intracranial abnormalities as a consequence of this pathology.       These findings and recommendations were discussed with Dr. Herron on   03/01/2018 at approximately 2:30 PM.       Radiation dose reduction techniques were utilized, including automated   exposure control and exposure modulation based on body size.       This report was finalized on 3/2/2018 9:28 AM by Dr. Ray Moncada MD.          CT  Facial Bones Without Contrast   Final Result       There is no evidence for an acute traumatic abnormality to the head or   maxillofacial region. However, there is a known squamous cell carcinoma   of the left ear and external auditory canal. A portion of the posterior   aspect of the left pinna has been resected. The left external auditory   canal is completely opacified. Also, there is complete opacification of   the left mastoid air cells and middle ear cavity. There is erosion of   the left tegmen tympani and there is a large bony defect along the floor   of the left middle cranial fossa as a result. Furthermore, there is   erosion of the posterior and superior wall of the left temporal   mandibular joint. A soft tissue density abnormality is appreciated at   the site of the erosion of the tegmen tympani. A fluid density   abnormality is seen just anterior to the left mandibular condyle. The   findings may represent some combination of tumor along with fluid within   the left temporal bone. The possibility of a superimposed infection   could not be excluded. The fluid density anterior to the left   temporomandibular joint once again could be noninfected fluid or   purulent material. Ultimately, I recommend performing a dedicated MRI of   the maxillofacial region as well as an MRI of the brain with and without   contrast to further characterize these abnormalities and to define the   extent of intracranial abnormalities as a consequence of this pathology.       These findings and recommendations were discussed with Dr. Herron on   03/01/2018 at approximately 2:30 PM.       Radiation dose reduction techniques were utilized, including automated   exposure control and exposure modulation based on body size.       This report was finalized on 3/2/2018 9:28 AM by Dr. Ray Moncada MD.          XR Chest 1 View   Final Result   Minimal likely scar atelectasis at the right base. Tortuous   aorta. Follow-up as clinically  indicated.       This report was finalized on 3/1/2018 1:27 PM by Dr. Trace Ng MD.                HOSPITAL COURSE  Patient is a 88 y.o. male presented to [facility] complaining of weakness and falls.  Please see the admitting history and physical for further details.  In the hospital with new onset atrial flutter and a urinary tract infection.  He was started on Rocephin for the UTI and culture growing Streptococcus mitis.  Cardiology was consulted for assistance with the atrial flutter but his rate was controlled.  Given his active ongoing malignancy they recommended against anticoagulation and started him on a low-dose beta blocker.  His rate remained controlled he's been in and out of atrial flutter throughout the course of hospitalization.  He developed significant hyponatremia with labs consistent with SIADH.  Nephrology was consulted and he was seen by Dr. White.  They recommended fluid restriction and his sodium is stabilized.  It still remains on a little low but has stabilized we've recommended continuation of the fluid restriction.  The course for hospitalization remained very weak and still had this left-sided facial droop.  It appeared to worsen day-to-day and there was concern that he may developed a new stroke given the new a flutter.  His MRI was reviewed [facility] was negative for any infarction or ischemia.  An MRI was repeated here showed a temporal lobe abscess and empyema and he was started on IV antibiotics.  Infectious disease and ENT were consulted for assistance along with neurosurgery.  He was seen and evaluated by Dr. Blunt, Dr Wade, and Dr Núñez.  A CT scan showed advancement of the cancer along the temporal bone and lots of involvement within the left side of the face.  This was the likely etiology of his progressive left facial droop.  Neurosurgery ENT discussed with the family and they elected against surgical intervention.   "Recommendations from the surgical team were to pursue hospice and palate care.  Infectious disease recommended broad-spectrum antibiotics while inpatient with transition to oral antibiotics at discharge.  His labs have remained stable his pains been controlled and has been improving a little bit with physical therapy each day.  The plan at this point is for him to transition to a skilled nursing facility to hopefully regain some strength and mobility before transitioning home with hospice.  He'll be on the oral Augmentin until March 28 at that point the antibiotics can be discontinued.  I discussed the plan with the family and questions of been answered.  At this point the patient stable for transition to a skilled nursing facility today.    CONDITION ON DISCHARGE  Stable.      VITAL SIGNS  /72 (BP Location: Left arm, Patient Position: Lying) Comment: pt kept arm bent  Pulse 64   Temp 98 °F (36.7 °C) (Oral)   Resp 16   Ht 182.9 cm (72\")   Wt 82.9 kg (182 lb 11.2 oz)   SpO2 95%   BMI 24.78 kg/m²    Objective:  General Appearance:  Comfortable (He has a left facial droop and an obvious abnormality on the left ear from previous surgery.).    Vital signs: (most recent): Blood pressure 175/72, pulse 64, temperature 98 °F (36.7 °C), temperature source Oral, resp. rate 16, height 182.9 cm (72\"), weight 82.9 kg (182 lb 11.2 oz), SpO2 95 %.  Vital signs are normal.    HEENT: (Left facial droop and abnormality on the left ear noted)    Lungs:  Normal effort.  Breath sounds clear to auscultation.    Heart: Normal rate.  S1 normal.    Abdomen: Abdomen is soft.  Bowel sounds are normal.   There is no abdominal tenderness.     Pulses: Distal pulses are intact.    Neurological: Patient is alert and oriented to person, place and time.    Pupils:  Pupils are equal, round, and reactive to light.    Skin:  Warm and dry.                DISCHARGE DISPOSITION         DISCHARGE MEDICATIONS   Krzysztof Ward   Home Medication " Instructions MARJ:[id number]    Printed on:03/13/18 1010   Medication Information                      alendronate (FOSAMAX) 70 MG tablet  TAKE 1 TABLET EVERY 7 DAYS             famotidine (PEPCID) 20 MG tablet  Take by mouth daily.             Glucosamine HCl 500 MG tablet  Take 500 mg by mouth Daily.             HYDROcodone-acetaminophen (NORCO) 7.5-325 MG per tablet  Take 1 tablet by mouth Every 8 (Eight) Hours As Needed for Moderate Pain .             Multiple Vitamin (MULTI-VITAMIN) tablet  Take 1 tablet by mouth Daily.             ramipril (ALTACE) 10 MG capsule  TAKE 2 CAPSULES DAILY             senna-docusate (PERICOLACE) 8.6-50 MG per tablet  Take 1 tablet by mouth As Needed.             simvastatin (ZOCOR) 20 MG tablet  Take 1 tablet by mouth Every Night.             valsartan (DIOVAN) 160 MG tablet  Take 1 tablet by mouth Daily.                Future Appointments  Date Time Provider Department Center   4/2/2018 10:20 AM Wlilie Ramirez MD [facility] None   4/16/2018 1:30 PM ADAM CT 2  ADAM CT [facility]     Follow-up Information     Eddy العلي MD. Schedule an appointment as soon as possible for a visit in 4 week(s).    Specialty:  Cardiology  Contact information:  [address] [id number] [phone]                   TEST  RESULTS PENDING AT DISCHARGE         Elijah Gillis MD  [facility]  03/13/18  10:10 AM      Time: greater than 30 minutes.          Electronically signed by Elijah Gillis MD at 3/13/2018 10:18 AM

## 2018-04-01 NOTE — ED NOTES
Skin tears to L elbow, L knee, and L hand cleaned with NS, CHERRIE applied and dry bandages.     Micaela Barnes RN  04/01/18 2895

## 2018-04-01 NOTE — ED PROVIDER NOTES
The patient presents complaining of L sided pains that began after the pt had an unwitnessed fall. Per the pt's family the pt had a head CT on March 1st and found a lesion in his brain, which he will have a follow up for in 2 weeks. The pt complains of L sided pains. The pt's head CT shows worsening brain cancer, which the pt has rejected surgery for. The pt's family would like a consultation with oncology for more information.      MD ATTESTATION NOTE    The MALVIN and I have discussed this patient's history, physical exam, and treatment plan.  I have reviewed the documentation and personally had a face to face interaction with the patient. I affirm the documentation and agree with the treatment and plan.  The attached note describes my personal findings.    Documentation assistance provided by nick Izaguirre for Dr. Tavares.  Information recorded by the nick was done at my direction and has been verified and validated by me.             Jaja Izaguirre  04/01/18 0359       Jaja Izaguirre  04/01/18 0441       Jaja Izaguirre  04/01/18 0526       Low Tavares MD  04/01/18 0609

## 2018-04-01 NOTE — ED TRIAGE NOTES
Pt to ER via EMS stretcher.  Pt from [facility].  Fell out of lift chair.Pt with abrasion to left forearm.  Skin tear to rt hand and left knee.

## 2018-04-01 NOTE — DISCHARGE INSTRUCTIONS
Use the provided incentive spirometer every 2 -4 hours during hours awake to prevent development of pneumonia from rib fracture.  Return to the ER if you develop a cough or fever or if you have any further concerns.

## 2018-04-06 NOTE — PROGRESS NOTES
Krzysztof Ward  9/2/1929  Date of Office Visit: 04/06/2018  Encounter Provider: Eddy العلي MD  Place of Service: [facility] CARDIOLOGY      CHIEF COMPLAINT:  Paroxysmal atrial flutter    HISTORY OF PRESENT ILLNESS:  88-year-old male with a recent hospitalization in March 2018 with weakness and falls.  During the hospital stay he was noted to have an urinary tract infection growing strep.  He was treated with Rocephin.  He was noted to have new onset atrial flutter with a controlled ventricular rate.  Given his active ongoing malignancy we recommended against anticoagulation and started him on a low-dose of beta blockade.  He was also noted to have SIADH and was evaluated by renal.  Fluid restriction was recommended.  He had a left-sided facial droop that continued to worsen throughout his hospital stay.  He had an MRI performed that showed a temporal lobe abscess and empyema.  He was started on IV antibiotics.  Neurosurgery evaluated the patient and discussed with the family.  Surgical intervention was not undertaken.  Broad-spectrum antibiotics were continued.  He was transferred to a skilled nursing facility with the hope to transfer to home hospice.    Since he was discharged, he unfortunately did have a fall out of the bed and hit his left side.  They have been noting progression of his brain tumor, and he is going to be moved over to palliative care here in the next week.  His family states that he has had no complaints.  I have reviewed his blood pressure and heart rate measurements that have been sent over, and they have just shown very mild hypertension with no significant rapid ventricular rate.          Review of Systems   Constitution: Positive for weakness and malaise/fatigue. Negative for fever.   HENT: Negative for nosebleeds and sore throat.    Eyes: Negative for blurred vision and double vision.   Cardiovascular: Negative for chest pain, claudication,  palpitations and syncope.   Respiratory: Negative for cough, shortness of breath and snoring.    Endocrine: Negative for cold intolerance, heat intolerance and polydipsia.   Skin: Negative for itching, poor wound healing and rash.   Musculoskeletal: Negative for joint pain, joint swelling, muscle weakness and myalgias.   Gastrointestinal: Negative for abdominal pain, melena, nausea and vomiting.   Neurological: Negative for light-headedness, loss of balance, seizures and vertigo.   Psychiatric/Behavioral: Negative for altered mental status and depression.          Past Medical History:   Diagnosis Date   • Atrial flutter    • Chronic back pain    • Facial droop     left sided facial droop   • H/O degenerative disc disease    • Hypertension    • Osteoporosis    • Squamous cell cancer of external ear, left        The following portions of the patient's history were reviewed and updated as appropriate: Social history , Family history and Surgical history     Current Outpatient Prescriptions on File Prior to Visit   Medication Sig Dispense Refill   • alendronate (FOSAMAX) 70 MG tablet TAKE 1 TABLET EVERY 7 DAYS 12 tablet 3   • aspirin 81 MG EC tablet Take 1 tablet by mouth Daily. 30 tablet 1   • famotidine (PEPCID) 20 MG tablet Take by mouth daily.     • Glucosamine HCl 500 MG tablet Take 500 mg by mouth Daily.     • HYDROcodone-acetaminophen (NORCO) 7.5-325 MG per tablet Take 1 tablet by mouth Every 8 (Eight) Hours As Needed for Moderate Pain  for up to 20 doses. 20 tablet 0   • HYDROcodone-acetaminophen (NORCO) 7.5-325 MG per tablet Take 1 tablet by mouth Every 6 (Six) Hours As Needed for Moderate Pain . 10 tablet 0   • levETIRAcetam (KEPPRA) 1000 MG tablet Take 1 tablet by mouth Every 12 (Twelve) Hours. 60 tablet 1   • metoprolol succinate XL (TOPROL-XL) 50 MG 24 hr tablet Take 1 tablet by mouth Daily. 30 tablet 0   • senna-docusate (PERICOLACE) 8.6-50 MG per tablet Take 1 tablet by mouth As Needed.     • valsartan  "(DIOVAN) 320 MG tablet Take 1 tablet by mouth Daily. 90 tablet 0   • [DISCONTINUED] Multiple Vitamin (MULTI-VITAMIN) tablet Take 1 tablet by mouth Daily.     • [DISCONTINUED] ramipril (ALTACE) 10 MG capsule TAKE 2 CAPSULES DAILY 180 capsule 0     No current facility-administered medications on file prior to visit.        No Known Allergies    Vitals:    04/06/18 1028   BP: 100/60   Pulse: 70   Resp: 20   Weight: 83.5 kg (184 lb)   Height: 185.4 cm (73\")     Physical Exam   Constitutional: He is oriented to person, place, and time. He appears well-developed and well-nourished.   HENT:   Head: Normocephalic and atraumatic.   Eyes: Conjunctivae and EOM are normal. No scleral icterus.   Neck: Normal range of motion. Neck supple. Normal carotid pulses, no hepatojugular reflux and no JVD present. Carotid bruit is not present. No tracheal deviation present. No thyromegaly present.   Cardiovascular: Normal rate and regular rhythm.  Exam reveals no gallop and no friction rub.    No murmur heard.  Pulses:       Carotid pulses are 2+ on the right side, and 2+ on the left side.       Radial pulses are 2+ on the right side, and 2+ on the left side.        Femoral pulses are 2+ on the right side, and 2+ on the left side.       Dorsalis pedis pulses are 2+ on the right side, and 2+ on the left side.        Posterior tibial pulses are 2+ on the right side, and 2+ on the left side.   Pulmonary/Chest: Breath sounds normal. No respiratory distress. He has no decreased breath sounds. He has no wheezes. He has no rhonchi. He has no rales. He exhibits no tenderness.   Abdominal: Soft. Bowel sounds are normal. He exhibits no distension. There is no tenderness. There is no rebound.   Musculoskeletal: He exhibits no edema or deformity.   Neurological: He is alert and oriented to person, place, and time. He has normal strength. No sensory deficit.   Skin: No rash noted. No erythema.   Psychiatric: He has a normal mood and affect. His " behavior is normal.       No components found for: CBC  No results found for: CMP  No components found for: LIPID  No results found for: BMP      ECG 12 Lead  Date/Time: 4/6/2018 10:56 AM  Performed by: JACY LEE  Authorized by: JACY LEE   Comparison: compared with previous ECG from 3/1/2018  Similar to previous ECG  Rhythm: atrial flutter  Rate: normal  QRS axis: normal  Clinical impression: abnormal ECG            DISCUSSION/SUMMARYHe is a very pleasant 88-year-old gentleman with a medical history of weakness and falls who was recently in the hospital with a Strep urinary tract infection and was noted to have rate controlled atrial flutter.  He is still in rate controlled atrial flutter.  His intracerebral malignancy is progressing and he is being moved over to palliative care.  Continue metoprolol at current dose.  No changes.  We will see him back as needed.     Atrial Fibrillation and Atrial Flutter  Assessment  • The patient has persistent atrial flutter  • This is non-valvular in etiology  • The patient's CHADS2-VASc score is 3  • A [id number] score of 2 or more is considered a high risk for a thromboembolic event    Subjective - Objective  • Continuing atrial flutter.  Patient is going palliative care for intracerebral malignancy.

## 2018-04-16 ENCOUNTER — APPOINTMENT (OUTPATIENT)
Dept: CT IMAGING | Facility: HOSPITAL | Age: 83
End: 2018-04-16
Attending: RADIOLOGY

## 2019-01-29 NOTE — PLAN OF CARE
Fax request from [facility] for a refill on Liothyronine w/ methocel 7.5 mcg caps qty 270 . Please see paper in triage. Problem: Patient Care Overview (Adult)  Goal: Plan of Care Review  Outcome: Ongoing (interventions implemented as appropriate)   03/02/18 0428   Coping/Psychosocial Response Interventions   Plan Of Care Reviewed With patient   Patient Care Overview   Progress no change   Outcome Evaluation   Outcome Summary/Follow up Plan Pt alert and oriented. Remains in Aflutter- Afib with rate controlled. VSS- BPs WNL this shift. Medicated for pain PRN, IV fluids continued, Rocephin given for UTI. Denies any other complaints. Consult called to cardiology. Need recent MRI records from [facility]. Will continue to monitor.       Problem: Pain, Chronic (Adult)  Goal: Identify Related Risk Factors and Signs and Symptoms  Outcome: Outcome(s) achieved Date Met: 03/02/18    Goal: Acceptable Pain Control/Comfort Level  Outcome: Ongoing (interventions implemented as appropriate)      Problem: Fall Risk (Adult)  Goal: Identify Related Risk Factors and Signs and Symptoms  Outcome: Outcome(s) achieved Date Met: 03/02/18    Goal: Absence of Falls  Outcome: Ongoing (interventions implemented as appropriate)      Problem: Infection, Risk/Actual (Adult)  Goal: Identify Related Risk Factors and Signs and Symptoms  Outcome: Outcome(s) achieved Date Met: 03/02/18    Goal: Infection Prevention/Resolution  Outcome: Ongoing (interventions implemented as appropriate)

## 2024-11-23 NOTE — ED PROVIDER NOTES
" EMERGENCY DEPARTMENT ENCOUNTER    CHIEF COMPLAINT  Chief Complaint: injuries s/p fall  History given by: patient and family  History limited by: pt does not remember the fall  Room Number: EDWR/WR  PMD: Santy Tariq MD      HPI:  Pt is a 88 y.o. male who presents from the NH complaining of injuries s/p fall earlier today. There is a skin tear to the left hand, left elbow, left knee, and an abrasion to the left forehead. Pt's family reports he hit his head. Pt also c/o neck pain and rib pain. He does not remember the fall. Family states that NH heard the fall so they acted immediately. Pt takes ASA.    Duration:  PTA  Onset: sudden  Timing: constant  Location: L hand, L elbow, L knee, L forehead  Radiation: none  Quality: \"pain\"  Intensity/Severity: moderate  Progression: unchanged  Associated Symptoms: neck pain, rib pain  Aggravating Factors: none  Alleviating Factors: none  Previous Episodes: Pt does not report previous episodes.  Treatment before arrival: Pt does not report treatment PTA.    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 08/16/2017   • Atrial flutter 03/01/2018   • Hyponatremia 03/01/2018   • Fall 03/01/2018   • UTI (urinary tract infection) 03/01/2018   • Non-traumatic rhabdomyolysis 03/02/2018   • 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   • 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 No pertinent family history.    SOCIAL HISTORY  Social History     Social History   • Marital status:      Spouse name: N/A   • Number of children: N/A   • Years of education: N/A     Occupational History   • Not on file.     Social History Main Topics   • Smoking status: Former Smoker   • Smokeless tobacco: Former User      Comment: quit 1962   • Alcohol use Yes      Comment: a beer now and then   • Drug use:      Types: Hydrocodone   • Sexual activity: No     Other Topics Concern   • Not on file     Social History Narrative   • No narrative on file       ALLERGIES  Review of patient's allergies indicates no known allergies.    REVIEW OF SYSTEMS  Review of Systems   Constitutional: Negative for activity change, appetite change and fever.   HENT: Negative for congestion and sore throat.    Respiratory: Negative for cough and shortness of breath.    Cardiovascular: Negative for chest pain and leg swelling.   Gastrointestinal: Negative for abdominal pain, diarrhea and vomiting.   Genitourinary: Negative for decreased urine volume and dysuria.   Musculoskeletal: Positive for arthralgias (rib pain) and neck pain.   Skin: Positive for wound (skin tear to left hand, left elbow, and left knee. abrasion to left forehead). Negative for rash.   Neurological: Negative for weakness, numbness and headaches.   All other systems reviewed and are negative.      PHYSICAL EXAM  ED Triage Vitals [04/01/18 0030]   Temp Heart Rate Resp BP SpO2   97.5 °F (36.4 °C) 80 16 128/80 93 %      Temp src Heart Rate Source Patient Position BP Location FiO2 (%)   Tympanic -- -- -- --        Physical Exam   Constitutional: He is oriented to person, place, and time and well-developed, well-nourished, and in no distress.   HENT:   Head: Normocephalic and atraumatic.   Eyes: EOM are normal. Pupils are equal, round, and reactive to light.   Neck: Normal range of motion. Neck supple.   Cardiovascular: Normal rate, regular rhythm and normal heart sounds.    Pulmonary/Chest: Effort normal and breath sounds normal. No respiratory distress.   Abdominal: Soft. There is no tenderness. There is no rebound and no guarding.   Musculoskeletal: Normal range of motion. He exhibits no edema.   Tenderness to left side of ribs. Chronic tenderness to C-spine.   Neurological: He is alert and oriented to person, place, and time. He has normal sensation and normal strength.   Skin: Skin is warm and dry.   Psychiatric: Mood and affect normal.   Nursing note and vitals reviewed.        RADIOLOGY  CT Head Without Contrast   Preliminary Result   1.  No acute intracranial pathology, no acute hemorrhage.   2.  Interval increase in size of previously seen mass eroding the left   temporal bone from 3.4 x 2.7 cm to 4.5 x 5 cm. There is progressive   increase in erosion of the squamous temporal bone and involvement of the   temporomandibular joint. Abnormal underlying temporal bone demonstrates   edema signal, the tumor appears to have grown into the left temporal   lobe. Differential diagnoses includes a mass lesion or an infectious   process. Please clinically correlate.   3.  Further evaluation with MRI of the brain may be performed.       ----------------------------------------------------------------       CT CERVICAL SPINE WITHOUT CONTRAST.       TECHNIQUE: Radiation dose reduction techniques were utilized, including   automated exposure control and exposure modulation based on body size.   Routine axial images of the cervical spine with coronal and sagittal   reconstructed images.       HISTORY: Dementia, fall.        COMPARISON:  No prior studies for comparison.       FINDINGS:    Vertebral body height is normal, no acute fracture is seen. Multilevel   loss of intervertebral disc height, minimal spurring, mild facet joint   disease. 2 mm retrolisthesis of C3 3 and C4, 3 mm retrolisthesis of C4   on C5.       Prevertebral soft tissue is unremarkable.            IMPRESSION:    No acute fracture of the cervical spine. Mild to moderate spondylosis.       Findings called to Guanaco Rios, 4:38 AM.                      CT Cervical Spine Without Contrast   Preliminary Result   1.  No acute intracranial pathology, no acute hemorrhage.   2.  Interval increase in size of previously seen mass eroding the left   temporal bone from 3.4 x 2.7 cm to 4.5 x 5 cm. There is progressive   increase in erosion of the squamous temporal bone and involvement of the   temporomandibular joint. Abnormal underlying temporal bone demonstrates   edema signal, the tumor appears to have grown into the left temporal   lobe. Differential diagnoses includes a mass lesion or an infectious   process. Please clinically correlate.   3.  Further evaluation with MRI of the brain may be performed.       ----------------------------------------------------------------       CT CERVICAL SPINE WITHOUT CONTRAST.       TECHNIQUE: Radiation dose reduction techniques were utilized, including   automated exposure control and exposure modulation based on body size.   Routine axial images of the cervical spine with coronal and sagittal   reconstructed images.       HISTORY: Dementia, fall.       COMPARISON:  No prior studies for comparison.       FINDINGS:    Vertebral body height is normal, no acute fracture is seen. Multilevel   loss of intervertebral disc height, minimal spurring, mild facet joint   disease. 2 mm retrolisthesis of C3 3 and C4, 3 mm retrolisthesis of C4   on C5.       Prevertebral soft tissue is unremarkable.            IMPRESSION:    No acute fracture of the  cervical spine. Mild to moderate spondylosis.       Findings called to Guanaco Rios, 4:38 AM.                      XR Ribs Left With PA Chest   Preliminary Result   No acute fracture or dislocation.           ----------------------------------------------------------------       LEFT KNEE 2 VIEWS.       HISTORY: Fall, trauma to the knee, painful movement.       COMPARISON: No prior studies for comparison.       FINDINGS:   There is no fracture or dislocation. Left total knee replacement,   hardware is intact.       Soft tissue structures are unremarkable. Severe atherosclerotic disease.       IMPRESSION:   No acute fracture or dislocation.           ----------------------------------------------------------------       X-RAY RIBS LEFT WITH PA CHEST.       HISTORY: Fall, knee trauma, chest pain.       COMPARISON: No prior studies for comparison.       FINDINGS:   Cardiomediastinal silhouette is within normal limits.        Subtle lucency of the left seventh rib, incomplete fracture of the   seventh rib cannot be excluded.       There is no consolidation or effusion. No pneumothorax. Low lung   volumes.       IMPRESSION:   Fracture of the left seventh rib cannot be excluded please correlate   with focal tenderness.              XR Elbow 2 View Left   Preliminary Result   No acute fracture or dislocation.           ----------------------------------------------------------------       LEFT KNEE 2 VIEWS.       HISTORY: Fall, trauma to the knee, painful movement.       COMPARISON: No prior studies for comparison.       FINDINGS:   There is no fracture or dislocation. Left total knee replacement,   hardware is intact.       Soft tissue structures are unremarkable. Severe atherosclerotic disease.       IMPRESSION:   No acute fracture or dislocation.           ----------------------------------------------------------------       X-RAY RIBS LEFT WITH PA CHEST.       HISTORY: Fall, knee trauma, chest pain.       COMPARISON:  No prior studies for comparison.       FINDINGS:   Cardiomediastinal silhouette is within normal limits.        Subtle lucency of the left seventh rib, incomplete fracture of the   seventh rib cannot be excluded.       There is no consolidation or effusion. No pneumothorax. Low lung   volumes.       IMPRESSION:   Fracture of the left seventh rib cannot be excluded please correlate   with focal tenderness.              XR Knee 1 or 2 View Left   Preliminary Result   No acute fracture or dislocation.           ----------------------------------------------------------------       LEFT KNEE 2 VIEWS.       HISTORY: Fall, trauma to the knee, painful movement.       COMPARISON: No prior studies for comparison.       FINDINGS:   There is no fracture or dislocation. Left total knee replacement,   hardware is intact.       Soft tissue structures are unremarkable. Severe atherosclerotic disease.       IMPRESSION:   No acute fracture or dislocation.           ----------------------------------------------------------------       X-RAY RIBS LEFT WITH PA CHEST.       HISTORY: Fall, knee trauma, chest pain.       COMPARISON: No prior studies for comparison.       FINDINGS:   Cardiomediastinal silhouette is within normal limits.        Subtle lucency of the left seventh rib, incomplete fracture of the   seventh rib cannot be excluded.       There is no consolidation or effusion. No pneumothorax. Low lung   volumes.       IMPRESSION:   Fracture of the left seventh rib cannot be excluded please correlate   with focal tenderness.                   I ordered the above noted radiological studies. Interpreted by radiologist. Discussed with radiologist (Dr. George). Reviewed by me in PACS.       PROCEDURES  Procedures      PROGRESS AND CONSULTS  ED Course   0343  Ordered XR L knee, XR L elbow, XR L ribs, CT C-spine, and CT head after bedside evaluation.    0504  Rechecked pt who is using the restroom. Notified pt's family of all imaging  results. Family expressed that they would like pt to be d/c back to his NH. Pt understands and agrees with the plan, all questions answered.    0530  Pt's family is requesting to consult oncology.    MEDICAL DECISION MAKING  Results were reviewed/discussed with the patient and they were also made aware of online access. Pt also made aware that some labs, such as cultures, will not be resulted during ER visit and follow up with PMD is necessary.     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 CT head shows progression to tumor.)  Discussion of test results with the performing providers: yes (Dr. 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    Reviewed implications of results, diagnosis, meds, responsibility to follow up, warning signs and symptoms of possible worsening, potential complications and reasons to return to ER.    Patient/Family voiced understanding of above instructions.    Discussed plan for  discharge, as there is no emergent indication for admission. Patient referred to primary care provider for BP management due to today's BP. Pt/family is agreeable and understands need for follow up and repeat testing.  Pt is aware that discharge does not mean that nothing is wrong but it indicates no emergency is present that requires admission and they must continue care with follow-up as given below or physician of their choice.     FOLLOW-UP  Santy Tariq MD  [address] [facility] [address] [id number] [phone]    Schedule an appointment as soon as possible for a visit   For further evaluation and treatment    [facility]  [address]  [place] [address] [phone]    For further evaluation and treatment of squamous cell carcinoma.         Medication List      Changed    * HYDROcodone-acetaminophen 7.5-325 MG per tablet  Commonly known as:  NORCO  Take 1 tablet by mouth Every 8 (Eight) Hours As Needed for Moderate Pain    for up to 20 doses.  What changed:  Another medication with the same name was added. Make sure   you understand how and when to take each.     * HYDROcodone-acetaminophen 7.5-325 MG per tablet  Commonly known as:  NORCO  Take 1 tablet by mouth Every 6 (Six) Hours As Needed for Moderate Pain .  What changed:  You were already taking a medication with the same name,   and this prescription was added. Make sure you understand how and when to   take each.        * This list has 2 medication(s) that are the same as other medications   prescribed for you. Read the directions carefully, and ask your doctor or   other care provider to review them with you.                Latest Documented Vital Signs:  As of 6:21 AM  BP- 140/74 HR- 66 Temp- 97.5 °F (36.4 °C) (Tympanic) O2 sat- 96%    --  Documentation assistance provided by nick Horne for Guanaco Rios PA-C.  Information recorded by the nick was done at my direction and has been verified and validated by me.      Vesna Horne  04/01/18 0552       CHRISTINE Raymundo III  04/01/18 0621     No